# Patient Record
Sex: MALE | Race: WHITE | NOT HISPANIC OR LATINO | Employment: OTHER | URBAN - METROPOLITAN AREA
[De-identification: names, ages, dates, MRNs, and addresses within clinical notes are randomized per-mention and may not be internally consistent; named-entity substitution may affect disease eponyms.]

---

## 2017-08-21 ENCOUNTER — HISTORICAL (OUTPATIENT)
Dept: INFUSION THERAPY | Facility: HOSPITAL | Age: 67
End: 2017-08-21

## 2017-08-21 LAB
BUN SERPL-MCNC: 13 MG/DL (ref 7–18)
CALCIUM SERPL-MCNC: 9.7 MG/DL (ref 8.5–10.1)
CHLORIDE SERPL-SCNC: 102 MMOL/L (ref 98–107)
CO2 SERPL-SCNC: 31 MMOL/L (ref 21–32)
CREAT SERPL-MCNC: 1.09 MG/DL (ref 0.7–1.3)
GLUCOSE SERPL-MCNC: 166 MG/DL (ref 74–106)
POTASSIUM SERPL-SCNC: 4.5 MMOL/L (ref 3.5–5.1)
SODIUM SERPL-SCNC: 139 MMOL/L (ref 136–145)

## 2017-08-22 ENCOUNTER — HISTORICAL (OUTPATIENT)
Dept: INFUSION THERAPY | Facility: HOSPITAL | Age: 67
End: 2017-08-22

## 2017-08-22 LAB
BUN SERPL-MCNC: 11 MG/DL (ref 7–18)
CALCIUM SERPL-MCNC: 8.1 MG/DL (ref 8.5–10.1)
CHLORIDE SERPL-SCNC: 107 MMOL/L (ref 98–107)
CO2 SERPL-SCNC: 27 MMOL/L (ref 21–32)
CREAT SERPL-MCNC: 0.77 MG/DL (ref 0.7–1.3)
GLUCOSE SERPL-MCNC: 169 MG/DL (ref 74–106)
POTASSIUM SERPL-SCNC: 3.5 MMOL/L (ref 3.5–5.1)
SODIUM SERPL-SCNC: 141 MMOL/L (ref 136–145)

## 2017-08-23 ENCOUNTER — HISTORICAL (OUTPATIENT)
Dept: INFUSION THERAPY | Facility: HOSPITAL | Age: 67
End: 2017-08-23

## 2017-08-23 LAB
BUN SERPL-MCNC: 12 MG/DL (ref 7–18)
CALCIUM SERPL-MCNC: 8.8 MG/DL (ref 8.5–10.1)
CHLORIDE SERPL-SCNC: 99 MMOL/L (ref 98–107)
CO2 SERPL-SCNC: 32 MMOL/L (ref 21–32)
CREAT SERPL-MCNC: 0.98 MG/DL (ref 0.7–1.3)
GLUCOSE SERPL-MCNC: 166 MG/DL (ref 74–106)
POTASSIUM SERPL-SCNC: 3.8 MMOL/L (ref 3.5–5.1)
SODIUM SERPL-SCNC: 136 MMOL/L (ref 136–145)

## 2017-08-24 ENCOUNTER — HISTORICAL (OUTPATIENT)
Dept: INFUSION THERAPY | Facility: HOSPITAL | Age: 67
End: 2017-08-24

## 2017-08-24 LAB
BUN SERPL-MCNC: 16 MG/DL (ref 7–18)
CALCIUM SERPL-MCNC: 9.2 MG/DL (ref 8.5–10.1)
CHLORIDE SERPL-SCNC: 102 MMOL/L (ref 98–107)
CO2 SERPL-SCNC: 29 MMOL/L (ref 21–32)
CREAT SERPL-MCNC: 1 MG/DL (ref 0.7–1.3)
GLUCOSE SERPL-MCNC: 172 MG/DL (ref 74–106)
POTASSIUM SERPL-SCNC: 3.7 MMOL/L (ref 3.5–5.1)
SODIUM SERPL-SCNC: 137 MMOL/L (ref 136–145)

## 2017-08-25 ENCOUNTER — HISTORICAL (OUTPATIENT)
Dept: INFUSION THERAPY | Facility: HOSPITAL | Age: 67
End: 2017-08-25

## 2017-08-25 LAB
BUN SERPL-MCNC: 14 MG/DL (ref 7–18)
CALCIUM SERPL-MCNC: 9.3 MG/DL (ref 8.5–10.1)
CHLORIDE SERPL-SCNC: 99 MMOL/L (ref 98–107)
CO2 SERPL-SCNC: 31 MMOL/L (ref 21–32)
CREAT SERPL-MCNC: 1.01 MG/DL (ref 0.7–1.3)
GLUCOSE SERPL-MCNC: 167 MG/DL (ref 74–106)
POTASSIUM SERPL-SCNC: 3.8 MMOL/L (ref 3.5–5.1)
SODIUM SERPL-SCNC: 137 MMOL/L (ref 136–145)

## 2017-09-07 ENCOUNTER — TELEPHONE (OUTPATIENT)
Dept: NEUROLOGY | Facility: CLINIC | Age: 67
End: 2017-09-07

## 2017-09-07 NOTE — TELEPHONE ENCOUNTER
----- Message from Bianca Cordova sent at 9/6/2017  4:13 PM CDT -----  Contact: skye (wife) @ [phone] or [phone]  Calling to see if you received a referral from dr tiffanie vo in [place].  pls call.

## 2017-10-10 ENCOUNTER — OFFICE VISIT (OUTPATIENT)
Dept: NEUROLOGY | Facility: CLINIC | Age: 67
End: 2017-10-10
Payer: OTHER GOVERNMENT

## 2017-10-10 VITALS
WEIGHT: 242.31 LBS | HEIGHT: 72 IN | HEART RATE: 69 BPM | SYSTOLIC BLOOD PRESSURE: 142 MMHG | DIASTOLIC BLOOD PRESSURE: 84 MMHG | BODY MASS INDEX: 32.82 KG/M2

## 2017-10-10 DIAGNOSIS — G70.00 MYASTHENIA GRAVIS: Primary | ICD-10-CM

## 2017-10-10 PROCEDURE — 99205 OFFICE O/P NEW HI 60 MIN: CPT | Mod: S$GLB,,, | Performed by: PSYCHIATRY & NEUROLOGY

## 2017-10-10 PROCEDURE — 99999 PR PBB SHADOW E&M-EST. PATIENT-LVL IV: CPT | Mod: PBBFAC,,, | Performed by: PSYCHIATRY & NEUROLOGY

## 2017-10-10 RX ORDER — METOPROLOL SUCCINATE 25 MG/1
25 TABLET, EXTENDED RELEASE ORAL
COMMUNITY
Start: 2017-08-07 | End: 2018-03-13 | Stop reason: SDUPTHER

## 2017-10-10 RX ORDER — ATORVASTATIN CALCIUM 80 MG/1
80 TABLET, FILM COATED ORAL NIGHTLY
COMMUNITY
Start: 2017-08-07

## 2017-10-10 RX ORDER — SERTRALINE HYDROCHLORIDE 50 MG/1
50 TABLET, FILM COATED ORAL
COMMUNITY
Start: 2017-09-30 | End: 2017-12-23 | Stop reason: SDUPTHER

## 2017-10-10 RX ORDER — OMEPRAZOLE 20 MG/1
20 CAPSULE, DELAYED RELEASE ORAL DAILY
Qty: 30 CAPSULE | Refills: 11 | Status: SHIPPED | OUTPATIENT
Start: 2017-10-10 | End: 2018-11-28 | Stop reason: SDUPTHER

## 2017-10-10 RX ORDER — MELOXICAM 15 MG/1
15 TABLET ORAL
COMMUNITY
Start: 2017-07-21 | End: 2018-04-10

## 2017-10-10 RX ORDER — LISINOPRIL 40 MG/1
20 TABLET ORAL DAILY
COMMUNITY
Start: 2017-08-15

## 2017-10-10 RX ORDER — GLYCOPYRROLATE 1 MG/1
1 TABLET ORAL
COMMUNITY
Start: 2017-08-08 | End: 2018-03-13

## 2017-10-10 RX ORDER — PREDNISONE 10 MG/1
10 TABLET ORAL
COMMUNITY
Start: 2017-08-25 | End: 2017-10-10

## 2017-10-10 RX ORDER — PREDNISONE 20 MG/1
30 TABLET ORAL DAILY
Qty: 45 TABLET | Refills: 5 | Status: SHIPPED | OUTPATIENT
Start: 2017-10-10 | End: 2017-11-09

## 2017-10-10 RX ORDER — PYRIDOSTIGMINE BROMIDE 180 MG/1
180 TABLET, EXTENDED RELEASE ORAL
COMMUNITY
Start: 2017-10-03 | End: 2018-02-02 | Stop reason: SDUPTHER

## 2017-10-10 RX ORDER — ISOSORBIDE MONONITRATE 30 MG/1
15 TABLET, EXTENDED RELEASE ORAL EVERY MORNING
COMMUNITY
Start: 2017-08-07 | End: 2021-03-03

## 2017-10-10 RX ORDER — METFORMIN HYDROCHLORIDE 500 MG/1
500 TABLET, EXTENDED RELEASE ORAL
COMMUNITY
Start: 2017-09-06 | End: 2018-04-10 | Stop reason: SDUPTHER

## 2017-10-10 RX ORDER — CYCLOBENZAPRINE HCL 10 MG
10 TABLET ORAL
COMMUNITY
Start: 2017-07-21 | End: 2018-04-10

## 2017-10-10 RX ORDER — PREDNISONE 20 MG/1
20 TABLET ORAL
COMMUNITY
Start: 2017-09-21 | End: 2017-10-10

## 2017-10-10 NOTE — PATIENT INSTRUCTIONS
INCREASE PREDNISONE TO 30 MG  RESTART ZOLOFT  TAKE OMERPRAZOLE (ACID REDUCING PILL)  IVIG 80 G EVERY 3 WEEKS- WILL SET UP HOME INFUSION.

## 2017-10-10 NOTE — PROGRESS NOTES
Patient Name: Otoniel Candelario  MRN: [id number]    CC: Generalized weakness, blurry vision, Myasthenia gravis     HPI: Otoniel Candelario is a 66 y.o. male with Pmhx of HTN, DM, HLD, MG (Ach-R ab +) presents to neuromuscular clinic to establish care as he was recently diagnosed with MG in March, 2017. Patient started noticing drooping eyelids ~ 2 years ago. Patient also started experiencing neck weakness, choking on food/water, slurred speech and generalized weakness and fatigue during the end of the day. Patient also complains of diplopia, dyspnea and blurry vision. Patient referred by Dr. Carlos for further management of MG. Patient has received IVIG in the end of August- states that he started noticing improvement after a week. He reported improvement in chewing and vision. Patient's current regimen is Prednisone 10 mg (down from 20 mg) and timespan 180 mg bid. Patient states that he has felt increasing shortness of breath since decreasing prednisone dose down to 10 mg. Patient endorses chewing issues fatigue, blurriness of vision, swallowing issues with food and water, and lower ext weakness.       EMG- electrographic evidence of decremental response to repetative nerve stimulation    ROS:   Review of Systems   Constitutional: + for malaise/fatigue. Negative for weight loss.   HENT: Negative for hearing loss.   Eyes: Negative for blurred vision and double vision.   Respiratory: + for shortness of breath and -ve for stridor.   Cardiovascular: Negative for chest pain and palpitations.   Gastrointestinal: Negative for nausea, vomiting and constipation.   Genitourinary: Negative for frequency. Negative for urgency.   Musculoskeletal: Negative for joint pain. Negative for myalgias and falls.   Skin: Negative for rash.   Neurological: Negative for dizziness and tremors. Negative for focal weakness and seizures.   Endo/Heme/Allergies: Does not bruise/bleed easily.   Psychiatric/Behavioral: Negative for memory loss. Negative for  depression and hallucinations. The patient is somewhat nervous/anxious.      Past Medical History  No past medical history on file.    Medications    Current Outpatient Prescriptions:     atorvastatin (LIPITOR) 80 MG tablet, 80 mg., Disp: , Rfl:     cyclobenzaprine (FLEXERIL) 10 MG tablet, 10 mg., Disp: , Rfl:     glycopyrrolate (ROBINUL) 1 mg Tab, 1 mg., Disp: , Rfl:     isosorbide mononitrate (IMDUR) 30 MG 24 hr tablet, 30 mg., Disp: , Rfl:     lisinopril (PRINIVIL,ZESTRIL) 40 MG tablet, 40 mg., Disp: , Rfl:     meloxicam (MOBIC) 15 MG tablet, 15 mg., Disp: , Rfl:     metformin (GLUCOPHAGE-XR) 500 MG 24 hr tablet, 500 mg., Disp: , Rfl:     metoprolol succinate (TOPROL-XL) 25 MG 24 hr tablet, 25 mg., Disp: , Rfl:     predniSONE (DELTASONE) 10 MG tablet, 10 mg., Disp: , Rfl:     predniSONE (DELTASONE) 20 MG tablet, 20 mg., Disp: , Rfl:     pyridostigmine (MESTINON) 180 mg TbSR, 180 mg., Disp: , Rfl:     sertraline (ZOLOFT) 50 MG tablet, 50 mg., Disp: , Rfl:     Allergies  Review of patient's allergies indicates:  No Known Allergies    Social History  Social History     Social History    Marital status:      Spouse name: N/A    Number of children: N/A    Years of education: N/A     Occupational History    Not on file.     Social History Main Topics    Smoking status: Current Some Day Smoker     Types: Cigars    Smokeless tobacco: Never Used    Alcohol use Not on file    Drug use: Unknown    Sexual activity: Not on file     Other Topics Concern    Not on file     Social History Narrative    No narrative on file       Family History  No family history on file.    Physical Exam  BP (!) 142/84   Pulse 69   Ht 6' (1.829 m)   Wt 109.9 kg (242 lb 4.6 oz)   BMI 32.86 kg/m²     General appearance: Well-developed, well-groomed, bilateral ptosis    Neurologic Exam: The patient is awake, alert and oriented. Language is fluent. Recent and remote memory are normal. Attention span and concentration  are normal. Fund of knowledge is appropriate.     Cranial nerves: pupils are round and reactive to light and accommodation. Visual fields are full to confrontation. Fundoscopic exam reveals sharp discs bilaterally, with good venous pulsations. Ocular motility is full in all cardinal positions of gaze. Facial sensation is normal to pinprick and light touch. Corneal reflexes are present bilaterally. Facial activation is symmetric. Hearing is normal bilaterally. Palate elevates symmetrically and gag reflex is intact bilaterally. Shoulder elevation is symmetric and full strength bilaterally. Tongue is midline and neck rotation strength is normal bilaterally. Neck range of motion is normal.     Motor examination of all extremities demonstrates normal bulk and tone in all four limbs. There are no atrophy or fasciculations. Fatigubalilty in upper ext bilaterally 4/5 (deltoids), Neck flexion weakness 4/5    Sensory examination is normal to pinprick, vibration and proprioception in the upper and lower extremities bilaterally. Romberg is negative.    Deep tendon reflexes are 2+ and symmetric in the upper and lower extremities bilaterally. Toes are mute bilaterally.     Gait: Trouble tandem and heel walking    Coordination: Finger to nose and heel to shin testing is normal in both upper and lower extremities. Rapid alternating movements are normal in both upper and lower extremities.     Single breath count- 34      General exam  Cardiovascular: regular rate and rhythm with no murmurs, rubs or gallops. There are no carotid or vertebral artery bruits. Pulses in both upper and lower extremities are symmetric. There is no peripheral edema.   Head and neck: no cervical lymphadenopathy      Lab and Test Results    No results found for: WBC, HGB, HCT, PLT  No results found for: GLU, NA, K, CL, CO2, BUN, CREATININE, CALCIUM, MG, PHOS  No results found for: ALB, ALKPHOS, ALT, AST      Images: CT Chest- no evidence of  thymoma    Independently reviewed NO    Other Tests    Assessment and Plan    Otoniel Candelario is a 66 y.o. 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 Patient experiencing sob at rest and has historically had trouble with chewing (resolved since treatment initiated). Will increase his amount of prednisone and obtain labs including other antibody panel assoc with MG. Will also initiate IVIG home infusions every 2-3 weeks.     CTS referral   Increase prednsione to 30 mg Daily  IVIG 80g per day q2-3 weeks- infuse slow  TPMT  MG/LEMS panel  MuSK ab  CBC  CMP  Take zoloft- patient stopped it on his own- has anxiety  Omeprazole OTC  Will need to coordinate with PCP or endocrinology for BG control on increase steroid.     RTC in 4-6 weeks          Valarie Zacarias MD  Neurology Resident   [facility]  [address] [place] [address]  Pager: [phone]      I have personally taken a history and examined the patient and agree with the resident's note as stated above.    Fernando Trejo MD  [facility]

## 2017-10-12 PROBLEM — G70.00 MYASTHENIA GRAVIS: Status: ACTIVE | Noted: 2017-10-12

## 2017-10-22 ENCOUNTER — PATIENT MESSAGE (OUTPATIENT)
Dept: NEUROLOGY | Facility: CLINIC | Age: 67
End: 2017-10-22

## 2017-10-26 ENCOUNTER — TELEPHONE (OUTPATIENT)
Dept: NEUROLOGY | Facility: CLINIC | Age: 67
End: 2017-10-26

## 2017-10-26 NOTE — TELEPHONE ENCOUNTER
----- Message from Jethro Juan sent at 10/26/2017  9:43 AM CDT -----  Contact: Self @ [phone]  Pt is asking to speak with someone about scheduling IVIG treatment the doctor recommended. Pls call.

## 2017-10-26 NOTE — TELEPHONE ENCOUNTER
Spoke to the patient to let him know his referral was sent to [facility] and we are waiting for a response back.

## 2017-11-01 ENCOUNTER — TELEPHONE (OUTPATIENT)
Dept: NEUROLOGY | Facility: CLINIC | Age: 67
End: 2017-11-01

## 2017-11-01 NOTE — TELEPHONE ENCOUNTER
----- Message from Randy Nunez sent at 10/31/2017 12:10 PM CDT -----  Contact: self  Pt calling to f/u on IVIG treatment status pt can be reached at [phone]...

## 2017-11-01 NOTE — TELEPHONE ENCOUNTER
Spoke to the patient to let him know I would get in touch with [facility] today and have them call him.

## 2017-11-02 NOTE — TELEPHONE ENCOUNTER
----- Message from Jethro Juan sent at 11/1/2017 11:30 AM CDT -----  Contact: Arlene espinosa/ Lindy Castillo @ [phone], option 2  Arlene is asking to speak with Marta in regards to pt's IVIG therapy. Pls call.

## 2017-11-08 ENCOUNTER — TELEPHONE (OUTPATIENT)
Dept: NEUROLOGY | Facility: CLINIC | Age: 67
End: 2017-11-08

## 2017-11-08 NOTE — TELEPHONE ENCOUNTER
----- Message from Cristina Weathers sent at 11/8/2017 12:03 PM CST -----  Contact: self  Otoniel would like to speak with a nurse regarding a treatment that he is supposed to be set up for, but has not heard from anyone regarding an appt for this treatment.  Pt is calling for an update.    Please contact Otoniel back at [phone]    Thank you

## 2017-11-08 NOTE — TELEPHONE ENCOUNTER
Spoke to the patient to let him know [place] has his information and will be contacting him. I also spoke with [place] and his information was given to his insurance company.

## 2017-11-13 ENCOUNTER — TELEPHONE (OUTPATIENT)
Dept: NEUROLOGY | Facility: CLINIC | Age: 67
End: 2017-11-13

## 2017-11-13 NOTE — TELEPHONE ENCOUNTER
----- Message from Suzanne Treadwell sent at 11/13/2017  2:42 PM CST -----  Contact: PT  PT is calling requesting to speak with nurse regarding medication & future appointment  PT is calling about IV treatments that's scheduled to be at his home.    Callback: [phone]

## 2017-11-16 ENCOUNTER — TELEPHONE (OUTPATIENT)
Dept: NEUROLOGY | Facility: CLINIC | Age: 67
End: 2017-11-16

## 2017-11-16 NOTE — TELEPHONE ENCOUNTER
I spoke to this patient several times to let him know [place] has his referral and they will call him to schedule an appointment for his IVIG.

## 2017-11-16 NOTE — TELEPHONE ENCOUNTER
----- Message from Katty Davis sent at 11/16/2017 10:05 AM CST -----  Contact: Pt  Pt would like to be called back regarding IV treatments that he was supposed to be scheduled for.        Please call pt at [phone].        Thanks!

## 2017-11-17 ENCOUNTER — PATIENT MESSAGE (OUTPATIENT)
Dept: NEUROLOGY | Facility: CLINIC | Age: 67
End: 2017-11-17

## 2017-11-20 ENCOUNTER — TELEPHONE (OUTPATIENT)
Dept: NEUROLOGY | Facility: CLINIC | Age: 67
End: 2017-11-20

## 2017-11-20 NOTE — TELEPHONE ENCOUNTER
----- Message from Bruno Nix sent at 11/20/2017  9:19 AM CST -----  Contact: Pt  Pt would like a call back from nurse in ref to IV treatment.      Pt stated he is always weak in the morning and the IV treatments have help with issue in the past     Can be reached at [phone]

## 2017-11-28 ENCOUNTER — TELEPHONE (OUTPATIENT)
Dept: FAMILY MEDICINE | Facility: CLINIC | Age: 67
End: 2017-11-28

## 2017-11-28 NOTE — TELEPHONE ENCOUNTER
----- Message from Jethro MCCRARY Drake sent at 11/28/2017  9:46 AM CST -----  Contact: Self @ [phone]  Pt states he was scheduled for IVIG treatment, but [facility] is saying they don't have script for medication, and pt said he's been waiting 2 months regarding this and is asking to speak with someone asap. Pls call.

## 2017-12-04 ENCOUNTER — TELEPHONE (OUTPATIENT)
Dept: NEUROLOGY | Facility: CLINIC | Age: 67
End: 2017-12-04

## 2017-12-04 NOTE — TELEPHONE ENCOUNTER
----- Message from Anushka Gaona sent at 12/1/2017  2:13 PM CST -----  Contact: [facility]  Mr. Rendon is calling to speak with Staff regarding complete orders for the pt's IG Infusion.  The forms have been sent three times and they have not received a response and the pt's been calling them.    He can be reached at [phone], Option 2.    Thank you.

## 2017-12-06 ENCOUNTER — TELEPHONE (OUTPATIENT)
Dept: NEUROLOGY | Facility: CLINIC | Age: 67
End: 2017-12-06

## 2017-12-06 NOTE — TELEPHONE ENCOUNTER
Spoke to the patient to let him know I will continue to call [place] [facility].

## 2017-12-06 NOTE — TELEPHONE ENCOUNTER
----- Message from Samy Fitzgerald sent at 12/6/2017  7:51 AM CST -----  Contact: Pt   Pt would like the nurse to give him a call back in regards to his prescription was messed and need to get his treatments done .. Contact number [phone]..

## 2017-12-07 NOTE — TELEPHONE ENCOUNTER
----- Message from Bubba Srivastava sent at 12/7/2017 10:15 AM CST -----  Contact: [facility] @ [phone] opt 2  Caller is calling to get clarity on medication, pls call

## 2017-12-20 ENCOUNTER — TELEPHONE (OUTPATIENT)
Dept: NEUROLOGY | Facility: CLINIC | Age: 67
End: 2017-12-20

## 2017-12-20 NOTE — TELEPHONE ENCOUNTER
----- Message from Everardo Harper sent at 12/20/2017  9:47 AM CST -----  Contact: patient   Patient called in requesting to speak with Dr. Zacarias or Dr. Zacarias's nurse. Please contact patient at [phone]. Thank You.

## 2017-12-20 NOTE — TELEPHONE ENCOUNTER
Patient had the IVIG on last week and will have the second round is on January 3. Would like to know when you would like to see him back in clinic. He would also like to know if you can refill his sertaline.

## 2017-12-23 RX ORDER — SERTRALINE HYDROCHLORIDE 50 MG/1
TABLET, FILM COATED ORAL
Qty: 15 TABLET | Refills: 3 | Status: SHIPPED | OUTPATIENT
Start: 2017-12-23 | End: 2018-04-27 | Stop reason: SDUPTHER

## 2018-01-09 ENCOUNTER — TELEPHONE (OUTPATIENT)
Dept: NEUROLOGY | Facility: CLINIC | Age: 68
End: 2018-01-09

## 2018-01-09 NOTE — TELEPHONE ENCOUNTER
----- Message from Bubba Srivastava sent at 1/8/2018  4:11 PM CST -----  Contact: Patient @ [phone]  Patient is requesting a return call about a f/u before or after the IVIG treatment, pls call

## 2018-01-19 ENCOUNTER — TELEPHONE (OUTPATIENT)
Dept: NEUROLOGY | Facility: CLINIC | Age: 68
End: 2018-01-19

## 2018-01-19 NOTE — TELEPHONE ENCOUNTER
----- Message from Bubba Srivastava sent at 1/19/2018 10:15 AM CST -----  Contact: Patient @ [phone]  Patient is calling to get an update on a following appt if needed following to IVIG treatment, pls call

## 2018-01-31 RX ORDER — PYRIDOSTIGMINE BROMIDE 180 MG/1
180 TABLET, EXTENDED RELEASE ORAL
OUTPATIENT
Start: 2018-01-31

## 2018-01-31 NOTE — TELEPHONE ENCOUNTER
----- Message from Migue Herrera sent at 1/31/2018  8:48 AM CST -----  Contact: Pt  States he would like a call regarding refill on his medication.    pyridostigmine (MESTINON) 180 mg TbSR    He would also like a call regarding his treatments    Call pharmacy [phone]    Call him @ [phone]

## 2018-01-31 NOTE — TELEPHONE ENCOUNTER
Patient would like to know when you want to see him back in clinic. Also have questions about a medication you wanted to start him on. Please advise patient.

## 2018-02-05 ENCOUNTER — TELEPHONE (OUTPATIENT)
Dept: NEUROLOGY | Facility: CLINIC | Age: 68
End: 2018-02-05

## 2018-02-05 RX ORDER — PYRIDOSTIGMINE BROMIDE 180 MG/1
TABLET, EXTENDED RELEASE ORAL
Qty: 60 TABLET | Refills: 5 | Status: SHIPPED | OUTPATIENT
Start: 2018-02-05 | End: 2018-03-13 | Stop reason: SDUPTHER

## 2018-02-05 NOTE — TELEPHONE ENCOUNTER
Attempted to call patient back multiple times this morning. I am unaware of which medication he has questions about as he is taking prednisone and sertraline which I have refilled from him in the past.

## 2018-02-05 NOTE — TELEPHONE ENCOUNTER
----- Message from Anushka Gaona sent at 2/2/2018 12:18 PM CST -----  Contact: Self  Pt is calling to speak with Staff regarding his prescription for medication.    He can be reached at [phone].    Thank you.

## 2018-02-12 ENCOUNTER — TELEPHONE (OUTPATIENT)
Dept: NEUROLOGY | Facility: CLINIC | Age: 68
End: 2018-02-12

## 2018-02-12 NOTE — TELEPHONE ENCOUNTER
----- Message from Jethro Juan sent at 2/12/2018 10:30 AM CST -----  Contact: Nithin/ NATALIYA Patel @ [phone], ext [phone]  Sandra is calling to confirm prior auth for gamunex c, sent on 02/08, was received. Pls call.

## 2018-02-15 ENCOUNTER — TELEPHONE (OUTPATIENT)
Dept: NEUROLOGY | Facility: CLINIC | Age: 68
End: 2018-02-15

## 2018-02-15 NOTE — TELEPHONE ENCOUNTER
----- Message from Anushka Gaona sent at 2/15/2018  9:45 AM CST -----  Contact: Self  Pt is calling to speak with Staff regarding treatments.  His medication was to be shipped yesterday, but did not come because Dr. Zacarias needs to provide pre-authorization.    He can be reached at [phone].    Thank you.

## 2018-02-16 NOTE — TELEPHONE ENCOUNTER
----- Message from Jethro Juan sent at 2/16/2018 10:05 AM CST -----  Contact: Sandra espinosa/ [facility] @ [phone]  Sandra is calling to check on the status of PA for jaky Flores said verbal order is okay too.

## 2018-03-13 ENCOUNTER — OFFICE VISIT (OUTPATIENT)
Dept: NEUROLOGY | Facility: CLINIC | Age: 68
End: 2018-03-13
Payer: OTHER GOVERNMENT

## 2018-03-13 VITALS — WEIGHT: 238.75 LBS | BODY MASS INDEX: 32.38 KG/M2

## 2018-03-13 DIAGNOSIS — G70.00 MG (MYASTHENIA GRAVIS): Primary | ICD-10-CM

## 2018-03-13 DIAGNOSIS — R73.9 HYPERGLYCEMIA: Primary | ICD-10-CM

## 2018-03-13 PROCEDURE — 99214 OFFICE O/P EST MOD 30 MIN: CPT | Mod: S$GLB,,, | Performed by: PSYCHIATRY & NEUROLOGY

## 2018-03-13 PROCEDURE — 99999 PR PBB SHADOW E&M-EST. PATIENT-LVL III: CPT | Mod: PBBFAC,,, | Performed by: PSYCHIATRY & NEUROLOGY

## 2018-03-13 RX ORDER — PYRIDOSTIGMINE BROMIDE 180 MG/1
180 TABLET, EXTENDED RELEASE ORAL NIGHTLY
Qty: 30 TABLET | Refills: 5 | Status: SHIPPED | OUTPATIENT
Start: 2018-03-13 | End: 2018-04-10

## 2018-03-13 RX ORDER — ALFUZOSIN HYDROCHLORIDE 10 MG/1
10 TABLET, EXTENDED RELEASE ORAL DAILY
Refills: 6 | COMMUNITY
Start: 2018-02-19 | End: 2018-06-06

## 2018-03-13 RX ORDER — PYRIDOSTIGMINE BROMIDE 60 MG/1
60 TABLET ORAL 2 TIMES DAILY
Qty: 60 TABLET | Refills: 11 | Status: SHIPPED | OUTPATIENT
Start: 2018-03-13 | End: 2018-04-10 | Stop reason: DRUGHIGH

## 2018-03-13 RX ORDER — PREDNISONE 20 MG/1
20 TABLET ORAL DAILY
Refills: 5 | COMMUNITY
Start: 2018-02-11 | End: 2018-04-26 | Stop reason: SDUPTHER

## 2018-03-13 RX ORDER — METFORMIN HYDROCHLORIDE 500 MG/1
500 TABLET ORAL 2 TIMES DAILY
Refills: 3 | COMMUNITY
Start: 2018-02-14 | End: 2018-07-06 | Stop reason: CLARIF

## 2018-03-13 NOTE — PROGRESS NOTES
Patient Name: Otoniel Candelario  MRN: [id number]    CC: Generalized weakness, blurry vision, Myasthenia gravis     Thymic status- CT scan in 2017- no evidence of thymoma (has not seen CTS)  Maintenance: IVIG q3w, prednisone 30 mg daily  Last dose change: Increase Imuran to 50 mg bid (12/2017)  Rescue: IVIG   Prior immunemodulatory agents: None    HPI: Otoniel Candelario is a 67 y.o. male with Pmhx of HTN, DM, HLD, MG (Ach-R ab +) presents to neuromuscular clinic to establish care as he was recently diagnosed with MG in March, 2017. Patient started noticing drooping eyelids ~ 2 years ago. Patient also started experiencing neck weakness, choking on food/water, slurred speech and generalized weakness and fatigue during the end of the day. Patient also complains of diplopia, dyspnea and blurry vision. Patient referred by Dr. Carlos for further management of MG. Patient has received IVIG in the end of August- states that he started noticing improvement after a week. He reported improvement in chewing and vision. Patient's current regimen is Prednisone 10 mg (down from 20 mg) and timespan 180 mg bid. Patient states that he has felt increasing shortness of breath since decreasing prednisone dose down to 10 mg. Patient endorses chewing issues fatigue, blurriness of vision, swallowing issues with food and water, and lower ext weakness.       EMG- electrographic evidence of decremental response to repetative nerve stimulation    Interval Hx- Patient states that he has had significant improvement since receiving home IVIG. Improved SOB and chewing issues. Decreased muscle fatigue, has been working outside in the yard. States that he has been having BLE muscle cramps mostly at night time. Also has what sounds like dystonia in upper ext at nighttime. States that he feels that timespan is less effective than when he was taking mestinon.     ROS:   Review of Systems   Constitutional: + for malaise/fatigue. Negative for weight loss.   HENT:  Negative for hearing loss.   Eyes: Negative for blurred vision and double vision.   Respiratory: + for shortness of breath and -ve for stridor.   Cardiovascular: Negative for chest pain and palpitations.   Gastrointestinal: Negative for nausea, vomiting and constipation.   Genitourinary: Negative for frequency. Negative for urgency.   Musculoskeletal: Negative for joint pain. Negative for myalgias and falls.   Skin: Negative for rash.   Neurological: Negative for dizziness and tremors. Negative for focal weakness and seizures.   Endo/Heme/Allergies: Does not bruise/bleed easily.   Psychiatric/Behavioral: Negative for memory loss. Negative for depression and hallucinations. The patient is somewhat nervous/anxious.      Past Medical History  No past medical history on file.    Medications    Current Outpatient Prescriptions:     atorvastatin (LIPITOR) 80 MG tablet, 80 mg., Disp: , Rfl:     glycopyrrolate (ROBINUL) 1 mg Tab, 1 mg., Disp: , Rfl:     isosorbide mononitrate (IMDUR) 30 MG 24 hr tablet, 30 mg., Disp: , Rfl:     lisinopril (PRINIVIL,ZESTRIL) 40 MG tablet, 40 mg., Disp: , Rfl:     metformin (GLUCOPHAGE-XR) 500 MG 24 hr tablet, 500 mg., Disp: , Rfl:     omeprazole (PRILOSEC) 20 MG capsule, Take 1 capsule (20 mg total) by mouth once daily., Disp: 30 capsule, Rfl: 11    pyridostigmine (MESTINON) 180 mg TbSR, TAKE 1 TABLET BY MOUTH TWICE A DAY, Disp: 60 tablet, Rfl: 5    sertraline (ZOLOFT) 50 MG tablet, TAKE 1/2 TABLET BY MOUTH AT BEDTIME, Disp: 15 tablet, Rfl: 3    alfuzosin (UROXATRAL) 10 mg Tb24, Take 10 mg by mouth once daily., Disp: , Rfl: 6    cyclobenzaprine (FLEXERIL) 10 MG tablet, 10 mg., Disp: , Rfl:     meloxicam (MOBIC) 15 MG tablet, 15 mg., Disp: , Rfl:     metFORMIN (GLUCOPHAGE) 500 MG tablet, Take 500 mg by mouth 2 (two) times daily., Disp: , Rfl: 3    predniSONE (DELTASONE) 20 MG tablet, Take 20 mg by mouth once daily., Disp: , Rfl: 5    Allergies  Review of patient's allergies  indicates:  No Known Allergies    Social History  Social History     Social History    Marital status:      Spouse name: N/A    Number of children: N/A    Years of education: N/A     Occupational History    Not on file.     Social History Main Topics    Smoking status: Current Some Day Smoker     Types: Cigars    Smokeless tobacco: Never Used    Alcohol use Not on file    Drug use: Unknown    Sexual activity: Not on file     Other Topics Concern    Not on file     Social History Narrative    No narrative on file       Family History  No family history on file.    Physical Exam  Wt 108.3 kg (238 lb 12.1 oz)   BMI 32.38 kg/m²     Myasthenia Gravis Activities of Daily Living Scale     Grade   Function 0 1 2 3   Double Vision None Occasional, not every day Daily, but not constant Constant    x      Eyelid Droop None Occasional, not every day Daily, but not constant Constant    x      Talking Normal Intermittent slurring or nasal speech Constant slurring or nasal speech, but can be understood Speech difficult to understand    x      Chewing Normal Fatigues with solid food Fatigues with soft food Gastric tube    x      Swallowing Normal Chokes rarely Frequent choking requiring change of diet Gastric tube     x     Breathing Normal Shortness of breath on exertion Shortness of breath at rest Ventilator     x     Brushing teeth or hair Normal Requires extra effort but requires no rest period Rest periods needed Cannot do one or more of these functions    x      Ability to rise from chair or toilet Normal Sometimes uses arms Always uses arms Requires assistance     x     Total MG ADL Score 3     MG Composite Scale  Ptosis, upward gaze (physician examination)   > 45 seconds   = 0 11 - 45 seconds  = 1 1 - 10 seconds  = 2 Immediate   = 3     1     Double vision on lateral gaze,   left or right   (physician examination) > 45 seconds  = 0 11 - 45 seconds  = 1 1 - 10 seconds  = 2 Immediate   = 3      2    Eye  closure   (physician examination) Normal   = 0 Mild weakness   (forced open with effort)  = 0 Moderate weakness   (forced open easily)   = 1 Severe weakness (unable to keep eyes closed)  = 2    0      Talking   (patient history) Normal   = 0 Intermittent slurring or nasal speech  = 2 Constant slurring or nasal but can be understood   = 4 Difficult to understand speech   = 6    0      Chewing   (patient history) Normal   = 0 Fatigue with solid food   = 2 Fatigue with soft food  = 4 Gastric tube  = 6    0      Swallowing   (patient history) Normal   = 0 Rare episode of choking or trouble swallowing   = 2 Frequent trouble swallowing   = 5 Gastric tube   = 6    0      Breathing   (thought to be caused by MG) Normal   = 0 Shortness of breath with exertion   = 2 Shortness of breath at rest   = 4 Ventilator dependence   = 9    0      Neck flexion or extension (weakest) (physician examination) Normal   = 0 Mild weakness   = 1 Moderate weakness (~50% +/- 15%)  = 3 Severe weakness = 4    0      Shoulder abduction (physician examination) Normal   = 0 Mild weakness  = 1 Moderate weakness   (~50% +/- 15%)  = 4 Severe weakness = 5    0      Hip flexion   (physician examination) Normal   = 0 Mild weakness   = 2 Moderate weakness   (~50% +/- 15%)  = 4 Severe weakness = 5    0      Total MGC Score 3     Myasthenia Gravis Manual Muscle Testing  0 = Normal / no impairment  1 = 25% weak / mild impairment l 2 = 50% weak / moderate impairment  3 = 75% weak / severe impairment l 4 = paralyzed / unable to do  Cranial Right Left Sum   Eyelid ptosis 0 0 0   Diplopia 0 0 0   Eye Closure  0   Cheek Puff  0   Jaw Closure  0   Tongue Protrusion  0   Cranial Muscle Score    Limb    Neck flexion  0   Neck extension  0   Shoulder abduction 0 0 0   Elbow flexion 0 0    Elbow extension 0 0    Wrist extension 0 0     0 0    Hip flexion 1 1 2   Knee extension 0 0    Knee flexion 1 1    Ankle dorsiflexion 1 1    Ankle plantarflexion 1 1    Limb  Muscle Score 2   Total MMT Score 2     Myasthenia Gravis- Quality of Life Score (revised) - MG QoL-15r  Please indicate how true each statement has been (over the past four weeks).   Not at all Somewhat Very Much    0 1 2   1. I am frustrated by my MG    x    2. I have trouble with my eyes because of my MG (e.g. double vision)    x    3. I have trouble eating because of MG   x     4. I have limited my social activity because of my MG   x     5. My MG limits my ability to enjoy hobbies and fun activities   x     6. I have trouble meeting the needs of my family because of my MG   x     7. I have to make plans around my MG    x    8. I am bothered by limitations in performing my work (include work at home) because of my MG  x    9. I have difficulty speaking due to MG   x     10. I have lost some personal independence because of my MG (e.g. driving, shopping, running errands)  x    11. I am depressed about my MG    x    12. I have trouble walking due to MG    x    13. I have trouble getting around public places because of my MG   x     14. I feel overwhelmed by my MG   x     15. I have trouble performing my personal grooming needs due to MG   x      Total MG-QoL15r Score 7         General appearance: Well-developed, well-groomed, bilateral ptosis has improved.     Neurologic Exam: The patient is awake, alert and oriented. Language is fluent. Recent and remote memory are normal. Attention span and concentration are normal. Fund of knowledge is appropriate.     Cranial nerves: pupils are round and reactive to light and accommodation. Visual fields are full to confrontation. Fundoscopic exam reveals sharp discs bilaterally, with good venous pulsations. Ocular motility is full in all cardinal positions of gaze. Facial sensation is normal to pinprick and light touch. Corneal reflexes are present bilaterally. Facial activation is symmetric. Hearing is normal bilaterally. Palate elevates symmetrically and gag reflex is intact  bilaterally. Shoulder elevation is symmetric and full strength bilaterally. Tongue is midline and neck rotation strength is normal bilaterally. Neck range of motion is normal.     Motor examination of all extremities demonstrates normal bulk and tone in all four limbs. There are no atrophy or fasciculations. No neck flexion or ext weakness. Shoulder AB/ADduction 5/5    Sensory examination is normal to pinprick, vibration and proprioception in the upper and lower extremities bilaterally. Romberg is negative.    Deep tendon reflexes are 2+ and symmetric in the upper and lower extremities bilaterally. Toes are mute bilaterally.     Gait: Trouble tandem and heel walking    Coordination: Finger to nose and heel to shin testing is normal in both upper and lower extremities. Rapid alternating movements are normal in both upper and lower extremities.     Single breath count- 24      General exam  Cardiovascular: regular rate and rhythm with no murmurs, rubs or gallops. There are no carotid or vertebral artery bruits. Pulses in both upper and lower extremities are symmetric. There is no peripheral edema.   Head and neck: no cervical lymphadenopathy        Lab and Test Results    WBC   Date Value Ref Range Status   10/10/2017 11.63 3.90 - 12.70 K/uL Final     Hemoglobin   Date Value Ref Range Status   10/10/2017 13.8 (L) 14.0 - 18.0 g/dL Final     Hematocrit   Date Value Ref Range Status   10/10/2017 41.2 40.0 - 54.0 % Final     Platelets   Date Value Ref Range Status   10/10/2017 236 150 - 350 K/uL Final     Glucose   Date Value Ref Range Status   10/10/2017 185 (H) 70 - 110 mg/dL Final     Sodium   Date Value Ref Range Status   10/10/2017 141 136 - 145 mmol/L Final     Potassium   Date Value Ref Range Status   10/10/2017 4.1 3.5 - 5.1 mmol/L Final     Chloride   Date Value Ref Range Status   10/10/2017 100 95 - 110 mmol/L Final     CO2   Date Value Ref Range Status   10/10/2017 31 (H) 23 - 29 mmol/L Final     BUN, Bld    Date Value Ref Range Status   10/10/2017 12 8 - 23 mg/dL Final     Creatinine   Date Value Ref Range Status   10/10/2017 0.9 0.5 - 1.4 mg/dL Final     Calcium   Date Value Ref Range Status   10/10/2017 9.2 8.7 - 10.5 mg/dL Final     Alkaline Phosphatase   Date Value Ref Range Status   10/10/2017 89 55 - 135 U/L Final     ALT   Date Value Ref Range Status   10/10/2017 39 10 - 44 U/L Final     AST   Date Value Ref Range Status   10/10/2017 29 10 - 40 U/L Final         Images: CT Chest- no evidence of thymoma    Independently reviewed NO    Other Tests    Assessment and Plan    Otoniel Candelario is a 67 y.o. 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 Patient experiencing sob at rest and has historically had trouble with chewing (resolved since treatment initiated). Will increase his amount of prednisone and obtain labs including other antibody panel assoc with MG. Patient with significant improvement in symptoms (choking, SOB, weakness) since starting IVIG. Will spread out IVIG to [id number]. Start mestinon and keep nighttime timespan with goal of removing timespan altogether.     CTS referral  Continue Prednsione to 30 mg Daily for now  Start Mestinion 60 mg in am and early afternoon, continue timespan 180 mg QHS  Decrease frequency of IVIG to q5 weeks  TPMT- nml  Taking zoloft for anxiety  Omeprazole OTC while on steroids  Will need to coordinate with PCP or endocrinology for BG control on increase steroid.   Annual derm exam due to Imuran  CK  Start Imuran in future.     RTC IN 3 WEEKS (END OF IVIG CYCLE)      Valarie Zacarias MD  Neurology Resident   [facility]  [address] [place] [id number]  Pager: [phone]          I have personally taken the history and examined the patient and agree with the resident's note as stated above.    Phu Kaur MD, TRUPTI, FAAN  Department of Neurology   [facility] [place], [place]

## 2018-03-13 NOTE — PATIENT INSTRUCTIONS
STOP Morning PYRIDOSTIGMINE ER and continue nighttime dose 180 mg.    START MESTINON 60 MG IN MORNING AND EARLY AFTERNOON    IVIG infusions - change from every 3 weeks to every 5 weeks. Monitor for clinical change. PLEASE CALL COMPANY

## 2018-03-26 ENCOUNTER — TELEPHONE (OUTPATIENT)
Dept: NEUROLOGY | Facility: CLINIC | Age: 68
End: 2018-03-26

## 2018-03-26 NOTE — TELEPHONE ENCOUNTER
----- Message from Bubba Srivastava sent at 3/23/2018 12:06 PM CDT -----  Contact: [facility] @ [phone] opt [phone]  Caller is calling to get a verbal on (GAMUNEX ) pls call

## 2018-04-10 ENCOUNTER — OFFICE VISIT (OUTPATIENT)
Dept: NEUROLOGY | Facility: CLINIC | Age: 68
End: 2018-04-10
Payer: OTHER GOVERNMENT

## 2018-04-10 VITALS
HEIGHT: 72 IN | BODY MASS INDEX: 31.14 KG/M2 | HEART RATE: 68 BPM | DIASTOLIC BLOOD PRESSURE: 96 MMHG | SYSTOLIC BLOOD PRESSURE: 167 MMHG | WEIGHT: 229.94 LBS

## 2018-04-10 DIAGNOSIS — R06.02 SOB (SHORTNESS OF BREATH): ICD-10-CM

## 2018-04-10 DIAGNOSIS — Z79.899 LONG-TERM CURRENT USE OF INTRAVENOUS IMMUNOGLOBULIN (IVIG): ICD-10-CM

## 2018-04-10 DIAGNOSIS — G70.00 MG (MYASTHENIA GRAVIS): Primary | ICD-10-CM

## 2018-04-10 PROCEDURE — 99999 PR PBB SHADOW E&M-EST. PATIENT-LVL IV: CPT | Mod: PBBFAC,,, | Performed by: PSYCHIATRY & NEUROLOGY

## 2018-04-10 PROCEDURE — 99214 OFFICE O/P EST MOD 30 MIN: CPT | Mod: S$GLB,,, | Performed by: PSYCHIATRY & NEUROLOGY

## 2018-04-10 RX ORDER — LANCETS 30 GAUGE
EACH MISCELLANEOUS
Refills: 0 | COMMUNITY
Start: 2018-02-14

## 2018-04-10 RX ORDER — PYRIDOSTIGMINE BROMIDE 60 MG/1
60 TABLET ORAL 4 TIMES DAILY
Qty: 120 TABLET | Refills: 11 | Status: SHIPPED | OUTPATIENT
Start: 2018-04-10 | End: 2018-11-12 | Stop reason: SDUPTHER

## 2018-04-10 RX ORDER — BLOOD SUGAR DIAGNOSTIC
STRIP MISCELLANEOUS
Refills: 12 | COMMUNITY
Start: 2018-03-27

## 2018-04-10 RX ORDER — AZATHIOPRINE 50 MG/1
50 TABLET ORAL 2 TIMES DAILY
Qty: 60 TABLET | Refills: 11 | Status: SHIPPED | OUTPATIENT
Start: 2018-04-10 | End: 2018-11-12 | Stop reason: SDUPTHER

## 2018-04-10 NOTE — PATIENT INSTRUCTIONS
PLEASE START IMURAN (AZATHIOPRINE) 50 MG TWICE A DAY THE Monday AFTER IVIG INFUSION    PLEASE STOP TAKING TIMESPAN (MESTINON EXTENDED RELEASE) THE AFTER IVIG INFUSION    PLEASE START TAKING MESTINON (PYRIDOSTIGMINE) 60 MG 3-4 TIMES PER DAY    CARDIOTHORACIC SURGERY REFERRAL    IVIG INFUSIONS EVERY 5 WEEKS    Myasthenia Gravis IMPORTANT INFORMATION:      Medication warning list:          1. Absolute contraindication   curare,   d-penicillamine,   botulinum toxin,   interferon alpha    2. Contraindicated    a. Antibiotics-- aminoglycosides (gentamycin, kanamycin, neomycin, streptomycin, tobramycine); macrolides (erythromycin, azithromycin (Z-pack), telithromycin, Biaxin)  Fluoroquinolones ( ciprofloxacin, norfloxacin, levofloxacin);  b. quinine, quinidine, procainamide,  c. magnesium salts, iv magnesium replacement.    3. Caution- may exacerbate weakness in some myasthenics  a. Calcium channel blockers  b. Beta blockers  c. Lithium  d. Statins  e. Iodinated contrast agents  F. benzodiazepines

## 2018-04-10 NOTE — PROGRESS NOTES
Patient Name: Otoniel Candelario  MRN: [id number]    CC: Generalized weakness, blurry vision, Myasthenia gravis     Thymic status- CT scan in 2017- no evidence of thymoma (has not seen CTS)  Maintenance: IVIG q5w, prednisone 30 mg daily  Last dose change: IVIG to [id number], mestinon 60 mg tid-qid  Rescue: IVIG   Prior immunemodulatory agents: None    Interval Hx- Patient endorses fatigue, complains of not being as active and is sluggish. Continues to cramp at night in legs and hands. Continues to feel aches and cramps in muscles. Experiencing ptosis ~ 2 days ago (either eye). Experiencing SOB with exertion. Having diarrhea, but same as before. Has not had IVIG in 6 weeks. States he is really feeling the effects of not having it. Head feels heavier at the end of the day. Arms and legs feel tired as well at end of the day. Has trouble going up steps since last IVIG infusion.     Interval Hx- Patient states that he has had significant improvement since receiving home IVIG. Improved SOB and chewing issues. Decreased muscle fatigue, has been working outside in the yard. States that he has been having BLE muscle cramps mostly at night time. Also has what sounds like dystonia in upper ext at nighttime. States that he feels that timespan is less effective than when he was taking mestinon.       HPI: Otoniel Candelario is a 67 y.o. male with Pmhx of HTN, DM, HLD, MG (Ach-R ab +) presents to neuromuscular clinic to establish care as he was recently diagnosed with MG in March, 2017. Patient started noticing drooping eyelids ~ 2 years ago. Patient also started experiencing neck weakness, choking on food/water, slurred speech and generalized weakness and fatigue during the end of the day. Patient also complains of diplopia, dyspnea and blurry vision. Patient referred by Dr. Carlos for further management of MG. Patient has received IVIG in the end of August- states that he started noticing improvement after a week. He reported improvement in  chewing and vision. Patient's current regimen is Prednisone 10 mg (down from 20 mg) and timespan 180 mg bid. Patient states that he has felt increasing shortness of breath since decreasing prednisone dose down to 10 mg. Patient endorses chewing issues fatigue, blurriness of vision, swallowing issues with food and water, and lower ext weakness.       EMG- electrographic evidence of decremental response to repetative nerve stimulation      ROS:   Review of Systems   Constitutional: + for malaise/fatigue. Negative for weight loss.   HENT: Negative for hearing loss.   Eyes: Negative for blurred vision and double vision.   Respiratory: + for shortness of breath and -ve for stridor.   Cardiovascular: Negative for chest pain and palpitations.   Gastrointestinal: Negative for nausea, vomiting and constipation.   Genitourinary: Negative for frequency. Negative for urgency.   Musculoskeletal: Negative for joint pain. Negative for myalgias and falls.   Skin: Negative for rash.   Neurological: Negative for dizziness and tremors. Negative for focal weakness and seizures.   Endo/Heme/Allergies: Does not bruise/bleed easily.   Psychiatric/Behavioral: Negative for memory loss. Negative for depression and hallucinations. The patient is somewhat nervous/anxious.      Past Medical History  No past medical history on file.    Medications    Current Outpatient Prescriptions:     alfuzosin (UROXATRAL) 10 mg Tb24, Take 10 mg by mouth once daily., Disp: , Rfl: 6    atorvastatin (LIPITOR) 80 MG tablet, 80 mg., Disp: , Rfl:     isosorbide mononitrate (IMDUR) 30 MG 24 hr tablet, 30 mg., Disp: , Rfl:     lisinopril (PRINIVIL,ZESTRIL) 40 MG tablet, 40 mg., Disp: , Rfl:     metFORMIN (GLUCOPHAGE) 500 MG tablet, Take 500 mg by mouth 2 (two) times daily., Disp: , Rfl: 3    omeprazole (PRILOSEC) 20 MG capsule, Take 1 capsule (20 mg total) by mouth once daily., Disp: 30 capsule, Rfl: 11    predniSONE (DELTASONE) 20 MG tablet, Take 20 mg by  mouth once daily., Disp: , Rfl: 5    pyridostigmine (MESTINON) 180 mg TbSR, Take 1 tablet (180 mg total) by mouth every evening., Disp: 30 tablet, Rfl: 5    pyridostigmine (MESTINON) 60 mg Tab, Take 1 tablet (60 mg total) by mouth 2 (two) times daily. Take 1 tablet in morning and early afternoon., Disp: 60 tablet, Rfl: 11    sertraline (ZOLOFT) 50 MG tablet, TAKE 1/2 TABLET BY MOUTH AT BEDTIME, Disp: 15 tablet, Rfl: 3    Allergies  Review of patient's allergies indicates:  No Known Allergies    Social History  Social History     Social History    Marital status:      Spouse name: N/A    Number of children: N/A    Years of education: N/A     Occupational History    Not on file.     Social History Main Topics    Smoking status: Current Some Day Smoker     Types: Cigars    Smokeless tobacco: Never Used    Alcohol use Not on file    Drug use: Unknown    Sexual activity: Not on file     Other Topics Concern    Not on file     Social History Narrative    No narrative on file       Family History  No family history on file.    Physical Exam  Ht 6' (1.829 m)   Wt 104.3 kg (229 lb 15 oz)   BMI 31.19 kg/m²     Myasthenia Gravis Activities of Daily Living Scale     Grade   Function 0 1 2 3   Double Vision None Occasional, not every day Daily, but not constant Constant    x      Eyelid Droop None Occasional, not every day Daily, but not constant Constant    x      Talking Normal Intermittent slurring or nasal speech Constant slurring or nasal speech, but can be understood Speech difficult to understand    x      Chewing Normal Fatigues with solid food Fatigues with soft food Gastric tube    x      Swallowing Normal Chokes rarely Frequent choking requiring change of diet Gastric tube     x     Breathing Normal Shortness of breath on exertion Shortness of breath at rest Ventilator     x     Brushing teeth or hair Normal Requires extra effort but requires no rest period Rest periods needed Cannot do one or  more of these functions    x      Ability to rise from chair or toilet Normal Sometimes uses arms Always uses arms Requires assistance     x     Total MG ADL Score 3     MG Composite Scale  Ptosis, upward gaze (physician examination)   > 45 seconds   = 0 11 - 45 seconds  = 1 1 - 10 seconds  = 2 Immediate   = 3       3   Double vision on lateral gaze,   left or right   (physician examination) > 45 seconds  = 0 11 - 45 seconds  = 1 1 - 10 seconds  = 2 Immediate   = 3       3   Eye closure   (physician examination) Normal   = 0 Mild weakness   (forced open with effort)  = 0 Moderate weakness   (forced open easily)   = 1 Severe weakness (unable to keep eyes closed)  = 2    0      Talking   (patient history) Normal   = 0 Intermittent slurring or nasal speech  = 2 Constant slurring or nasal but can be understood   = 4 Difficult to understand speech   = 6    0      Chewing   (patient history) Normal   = 0 Fatigue with solid food   = 2 Fatigue with soft food  = 4 Gastric tube  = 6    0      Swallowing   (patient history) Normal   = 0 Rare episode of choking or trouble swallowing   = 2 Frequent trouble swallowing   = 5 Gastric tube   = 6     2     Breathing   (thought to be caused by MG) Normal   = 0 Shortness of breath with exertion   = 2 Shortness of breath at rest   = 4 Ventilator dependence   = 9     2     Neck flexion or extension (weakest) (physician examination) Normal   = 0 Mild weakness   = 1 Moderate weakness (~50% +/- 15%)  = 3 Severe weakness = 4    0      Shoulder abduction (physician examination) Normal   = 0 Mild weakness  = 1 Moderate weakness   (~50% +/- 15%)  = 4 Severe weakness = 5    0      Hip flexion   (physician examination) Normal   = 0 Mild weakness   = 2 Moderate weakness   (~50% +/- 15%)  = 4 Severe weakness = 5    0      Total MGC Score 10     Myasthenia Gravis Manual Muscle Testing  0 = Normal / no impairment  1 = 25% weak / mild impairment l 2 = 50% weak / moderate impairment  3 = 75% weak /  severe impairment l 4 = paralyzed / unable to do  Cranial Right Left Sum   Eyelid ptosis 0 0 0   Diplopia 0 0 0   Eye Closure  0   Cheek Puff  0   Jaw Closure  0   Tongue Protrusion  0   Cranial Muscle Score    Limb    Neck flexion  0   Neck extension  0   Shoulder abduction 0 0 0   Elbow flexion 0 0    Elbow extension 0 0    Wrist extension 0 0     0 0    Hip flexion 0 1 1   Knee extension 0 0    Knee flexion 0 0    Ankle dorsiflexion 0 0    Ankle plantarflexion 0 0    Limb Muscle Score 1   Total MMT Score 1     Myasthenia Gravis- Quality of Life Score (revised) - MG QoL-15r  Please indicate how true each statement has been (over the past four weeks).   Not at all Somewhat Very Much    0 1 2   1. I am frustrated by my MG    x    2. I have trouble with my eyes because of my MG (e.g. double vision)    x    3. I have trouble eating because of MG   x     4. I have limited my social activity because of my MG   x     5. My MG limits my ability to enjoy hobbies and fun activities   x     6. I have trouble meeting the needs of my family because of my MG   x     7. I have to make plans around my MG    x    8. I am bothered by limitations in performing my work (include work at home) because of my MG  x    9. I have difficulty speaking due to MG   x     10. I have lost some personal independence because of my MG (e.g. driving, shopping, running errands)  x    11. I am depressed about my MG    x    12. I have trouble walking due to MG    x    13. I have trouble getting around public places because of my MG   x     14. I feel overwhelmed by my MG   x     15. I have trouble performing my personal grooming needs due to MG   x      Total MG-QoL15r Score 7         General appearance: Well-developed, well-groomed, bilateral ptosis has improved.     Neurologic Exam: The patient is awake, alert and oriented. Language is fluent. Recent and remote memory are normal. Attention span and concentration are normal. Fund of knowledge is  appropriate.     Cranial nerves: pupils are round and reactive to light and accommodation. Visual fields are full to confrontation. Fundoscopic exam reveals sharp discs bilaterally, with good venous pulsations. Ocular motility is full in all cardinal positions of gaze. Facial sensation is normal to pinprick and light touch. Corneal reflexes are present bilaterally. Facial activation is symmetric. Hearing is normal bilaterally. Palate elevates symmetrically and gag reflex is intact bilaterally. Shoulder elevation is symmetric and full strength bilaterally. Tongue is midline and neck rotation strength is normal bilaterally. Neck range of motion is normal.     Motor examination of all extremities demonstrates normal bulk and tone in all four limbs. There are no atrophy or fasciculations. No neck flexion or ext weakness. Shoulder AB/ADduction 5/5    Sensory examination is normal to pinprick, vibration and proprioception in the upper and lower extremities bilaterally. Romberg is negative.    Deep tendon reflexes are 2+ and symmetric in the upper and lower extremities bilaterally. Toes are mute bilaterally.     Gait: Trouble tandem and heel walking    Coordination: Finger to nose and heel to shin testing is normal in both upper and lower extremities. Rapid alternating movements are normal in both upper and lower extremities.     Single breath count- 25      General exam  Cardiovascular: regular rate and rhythm with no murmurs, rubs or gallops. There are no carotid or vertebral artery bruits. Pulses in both upper and lower extremities are symmetric. There is no peripheral edema.   Head and neck: no cervical lymphadenopathy        Lab and Test Results    WBC   Date Value Ref Range Status   03/13/2018 12.34 3.90 - 12.70 K/uL Final   10/10/2017 11.63 3.90 - 12.70 K/uL Final     Hemoglobin   Date Value Ref Range Status   03/13/2018 12.9 (L) 14.0 - 18.0 g/dL Final   10/10/2017 13.8 (L) 14.0 - 18.0 g/dL Final     Hematocrit    Date Value Ref Range Status   03/13/2018 38.8 (L) 40.0 - 54.0 % Final   10/10/2017 41.2 40.0 - 54.0 % Final     Platelets   Date Value Ref Range Status   03/13/2018 161 150 - 350 K/uL Final   10/10/2017 236 150 - 350 K/uL Final     Glucose   Date Value Ref Range Status   03/13/2018 330 (H) 70 - 110 mg/dL Final   10/10/2017 185 (H) 70 - 110 mg/dL Final     Sodium   Date Value Ref Range Status   03/13/2018 135 (L) 136 - 145 mmol/L Final   10/10/2017 141 136 - 145 mmol/L Final     Potassium   Date Value Ref Range Status   03/13/2018 4.2 3.5 - 5.1 mmol/L Final   10/10/2017 4.1 3.5 - 5.1 mmol/L Final     Chloride   Date Value Ref Range Status   03/13/2018 98 95 - 110 mmol/L Final   10/10/2017 100 95 - 110 mmol/L Final     CO2   Date Value Ref Range Status   03/13/2018 29 23 - 29 mmol/L Final   10/10/2017 31 (H) 23 - 29 mmol/L Final     BUN, Bld   Date Value Ref Range Status   03/13/2018 14 8 - 23 mg/dL Final   10/10/2017 12 8 - 23 mg/dL Final     Creatinine   Date Value Ref Range Status   03/13/2018 1.0 0.5 - 1.4 mg/dL Final   10/10/2017 0.9 0.5 - 1.4 mg/dL Final     Calcium   Date Value Ref Range Status   03/13/2018 9.4 8.7 - 10.5 mg/dL Final   10/10/2017 9.2 8.7 - 10.5 mg/dL Final     Alkaline Phosphatase   Date Value Ref Range Status   03/13/2018 67 55 - 135 U/L Final   10/10/2017 89 55 - 135 U/L Final     ALT   Date Value Ref Range Status   03/13/2018 34 10 - 44 U/L Final   10/10/2017 39 10 - 44 U/L Final     AST   Date Value Ref Range Status   03/13/2018 31 10 - 40 U/L Final   10/10/2017 29 10 - 40 U/L Final         Images: CT Chest- no evidence of thymoma    Independently reviewed NO    Other Tests    Assessment and Plan    Otoniel Candelario is a 67 y.o. 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 Patient experiencing sob at rest and has historically had trouble with chewing (resolved since treatment initiated). Will increase his amount of prednisone and obtain labs including other antibody panel assoc with MG.  Patient with significant improvement in symptoms (choking, SOB, weakness) since starting IVIG. Will spread out IVIG to [id number]. When patient seen prior to next IVIG infusion (~6 weeks out from last infusion), patient had a change in MGC score from 3--->10. Patient inc SOB and fatigued. Will require IVIG infusions every 5 weeks along with change in mestinon regimen. Will start patient on imuran once IVIG completed. Patient also will have labs drawn for RLS.     CTS referral  Continue Prednsione to 30 mg Daily for now  D/c timespan (after IVIG)  Mestinon 60 mg tid-qid (after IVIG)  Change frequency of IVIG to q5 weeks  TPMT- nml  Taking zoloft for anxiety  Omeprazole OTC while on steroids  Will need to coordinate with PCP or endocrinology for BG control on increase steroid.   Annual derm exam due to Imuran  Start Imuran 50 mg bid (after IVIG)  Ferratin, TIBC, Fe- RLS?     UPDATE: Patient with elevated WBC count with inc in immature granulocytes. I have contacted the patient and instructed him to follow with his PCP to r/o infection. Patient will hold off on starting immuno-mod therapy with imuran until infection is ruled out.     RTC IN 8 WEEKS    Myasthenia Gravis IMPORTANT INFORMATION:    Elective intubation should be considered if serial measurements of the VC show values less than 20 mL/kg or if the MIF is worse than -30 cmH20.    Medication warning list:          1. Absolute contraindication   curare,   d-penicillamine,   botulinum toxin,   interferon alpha    2. Contraindicated    a. Antibiotics-- aminoglycosides (gentamycin, kanamycin, neomycin, streptomycin, tobramycine); macrolides (erythromycin, azithromycin (Z-pack), telithromycin, Biaxin)  Fluoroquinolones ( ciprofloxacin, norfloxacin, levofloxacin);  b. quinine, quinidine, procainamide,  c. magnesium salts, iv magnesium replacement.    3. Caution- may exacerbate weakness in some myasthenics  a. Calcium channel blockers  b. Beta blockers  c. Lithium  d.  Statins  e. Iodinated contrast agents  F. benzodiazepines        Valarie Noe Zacarias MD  Neurology Resident   [facility]  [address] [place] [address]  Pager: [phone]      I have personally taken the history and examined the patient and agree with the resident's note as stated above.    Phu Kaur MD, TRUPTI, FAAN  Department of Neurology   [facility] [place], [place]

## 2018-04-12 ENCOUNTER — TELEPHONE (OUTPATIENT)
Dept: NEUROLOGY | Facility: CLINIC | Age: 68
End: 2018-04-12

## 2018-04-12 PROBLEM — R06.02 SOB (SHORTNESS OF BREATH): Status: ACTIVE | Noted: 2018-04-12

## 2018-04-12 PROBLEM — Z79.899 LONG-TERM CURRENT USE OF INTRAVENOUS IMMUNOGLOBULIN (IVIG): Status: ACTIVE | Noted: 2018-04-12

## 2018-04-12 NOTE — TELEPHONE ENCOUNTER
----- Message from Anushka Gaona sent at 4/12/2018  2:52 PM CDT -----  Contact: NATALIYA Sorto   Macario is calling to speak with Staff for a verbal authorization.    He can be reached at [phone].    Thank you.

## 2018-04-12 NOTE — TELEPHONE ENCOUNTER
----- Message from Jethro Juan sent at 4/12/2018 12:00 PM CDT -----  Contact: Self @ [phone]  Pt wanted to let the nurse know Dr. Elliott is asking for the blood work results/next steps to take regarding pt's white blood cell count to fax .

## 2018-04-13 NOTE — TELEPHONE ENCOUNTER
----- Message from Bubba Srivastava sent at 4/13/2018 12:13 PM CDT -----  Contact:  [facility] Prior Authorization  @ [phone]  Caller is calling to get an update on the prior authorization for ( FIORELLA-CNae ) pls call to update

## 2018-04-13 NOTE — TELEPHONE ENCOUNTER
----- Message from Claudia Hernandez sent at 4/13/2018  9:14 AM CDT -----  Contact: Sandra([facility]) @ [phone] (VERBAL)  Calling to speak with someone in / Rell's office regarding a prior authorization for medication: GAMUMEX-C. Please call.

## 2018-04-26 DIAGNOSIS — G70.00 MYASTHENIA GRAVIS: Primary | ICD-10-CM

## 2018-04-26 RX ORDER — PREDNISONE 20 MG/1
30 TABLET ORAL DAILY
Qty: 45 TABLET | Refills: 5 | Status: SHIPPED | OUTPATIENT
Start: 2018-04-26 | End: 2018-11-27

## 2018-04-26 NOTE — TELEPHONE ENCOUNTER
----- Message from Claudia Hernandez sent at 4/26/2018 10:24 AM CDT -----  Contact: pt @ [phone]  Calling for directions on taking the medication:  azaTHIOprine (IMURAN) 50 mg Tab. Asking to speak with someone regarding when to start taking the medicine. Please call.

## 2018-04-26 NOTE — TELEPHONE ENCOUNTER
----- Message from Claudia Hernandez sent at 4/26/2018 10:24 AM CDT -----  Contact: pt @ [phone]  Calling for directions on taking the medication:  azaTHIOprine (IMURAN) 50 mg Tab. Asking to speak with someone regarding when to start taking the medicine. Please call.

## 2018-04-27 RX ORDER — SERTRALINE HYDROCHLORIDE 50 MG/1
TABLET, FILM COATED ORAL
Qty: 15 TABLET | Refills: 3 | Status: SHIPPED | OUTPATIENT
Start: 2018-04-27 | End: 2018-08-28

## 2018-05-03 ENCOUNTER — TELEPHONE (OUTPATIENT)
Dept: NEUROLOGY | Facility: CLINIC | Age: 68
End: 2018-05-03

## 2018-05-03 NOTE — TELEPHONE ENCOUNTER
----- Message from Jethro Juan sent at 5/2/2018  8:44 AM CDT -----  Pharmacy Calling    Name of Caller: Macario  Pharmacy Name: [facility]  Prescription Name: Gamunex C (did not see in chart)  What do they need to clarify?: They're calling to confirm request for PA was received, which was faxed everyday this week up to today  Communication Preference (Mortezat response to Pt. (or) Call Back # and timeframe): [phone], ext [phone]  Additional Information:

## 2018-05-09 ENCOUNTER — TELEPHONE (OUTPATIENT)
Dept: NEUROLOGY | Facility: CLINIC | Age: 68
End: 2018-05-09

## 2018-05-09 NOTE — TELEPHONE ENCOUNTER
----- Message from Bianca Cordova sent at 2018  4:42 PM CDT -----  Contact: Macario AN [facility]    [phone] ext [phone]  Calling for the status of pts prior auth for Gamunex C.  This will  on 18.  Pls call.

## 2018-05-10 NOTE — TELEPHONE ENCOUNTER
Received call from Zora with [facility] Prior Authorizations. Verbal PA completed for IVIG. Response will be faxed to [phone] within 24-48 business hours.

## 2018-05-10 NOTE — TELEPHONE ENCOUNTER
----- Message from Suzanne Treadwell sent at 5/9/2018  5:44 PM CDT -----  Contact: Pharmacy  Infusion medication is needing refill by phone    Callback: [phone]  Ext - [phone]

## 2018-06-06 ENCOUNTER — TELEPHONE (OUTPATIENT)
Dept: CARDIOTHORACIC SURGERY | Facility: CLINIC | Age: 68
End: 2018-06-06

## 2018-06-06 ENCOUNTER — OFFICE VISIT (OUTPATIENT)
Dept: NEUROLOGY | Facility: CLINIC | Age: 68
End: 2018-06-06
Payer: MEDICARE

## 2018-06-06 ENCOUNTER — HOSPITAL ENCOUNTER (OUTPATIENT)
Dept: PULMONOLOGY | Facility: CLINIC | Age: 68
Discharge: HOME | End: 2018-06-06
Payer: OTHER GOVERNMENT

## 2018-06-06 ENCOUNTER — OFFICE VISIT (OUTPATIENT)
Dept: CARDIOTHORACIC SURGERY | Facility: CLINIC | Age: 68
End: 2018-06-06
Payer: MEDICARE

## 2018-06-06 VITALS
BODY MASS INDEX: 32.03 KG/M2 | WEIGHT: 228.81 LBS | HEIGHT: 71 IN | HEART RATE: 70 BPM | SYSTOLIC BLOOD PRESSURE: 156 MMHG | DIASTOLIC BLOOD PRESSURE: 83 MMHG

## 2018-06-06 VITALS
WEIGHT: 231.06 LBS | OXYGEN SATURATION: 95 % | DIASTOLIC BLOOD PRESSURE: 92 MMHG | HEIGHT: 72 IN | SYSTOLIC BLOOD PRESSURE: 162 MMHG | HEART RATE: 70 BPM | BODY MASS INDEX: 31.3 KG/M2

## 2018-06-06 DIAGNOSIS — G70.00 MYASTHENIA GRAVIS: ICD-10-CM

## 2018-06-06 DIAGNOSIS — G70.00 MYASTHENIA GRAVIS: Primary | ICD-10-CM

## 2018-06-06 DIAGNOSIS — G70.00 MG (MYASTHENIA GRAVIS): Primary | ICD-10-CM

## 2018-06-06 LAB
POST FEV1 FVC: 0.84
POST FEV1: 3.12
POST FVC: 3.73
PRE FEV1 FVC: 81
PRE FEV1: 2.8
PRE FVC: 3.44
PREDICTED FEV1 FVC: 79
PREDICTED FEV1: 3.37
PREDICTED FVC: 4.21

## 2018-06-06 PROCEDURE — 94060 EVALUATION OF WHEEZING: CPT | Mod: S$GLB,,, | Performed by: INTERNAL MEDICINE

## 2018-06-06 PROCEDURE — 99999 PR PBB SHADOW E&M-EST. PATIENT-LVL IV: CPT | Mod: PBBFAC,,, | Performed by: THORACIC SURGERY (CARDIOTHORACIC VASCULAR SURGERY)

## 2018-06-06 PROCEDURE — 94729 DIFFUSING CAPACITY: CPT | Mod: S$GLB,,, | Performed by: INTERNAL MEDICINE

## 2018-06-06 PROCEDURE — 99215 OFFICE O/P EST HI 40 MIN: CPT | Mod: S$GLB,,, | Performed by: PSYCHIATRY & NEUROLOGY

## 2018-06-06 PROCEDURE — 99999 PR PBB SHADOW E&M-EST. PATIENT-LVL III: CPT | Mod: PBBFAC,,, | Performed by: PSYCHIATRY & NEUROLOGY

## 2018-06-06 PROCEDURE — 94727 GAS DIL/WSHOT DETER LNG VOL: CPT | Mod: S$GLB,,, | Performed by: INTERNAL MEDICINE

## 2018-06-06 PROCEDURE — 99204 OFFICE O/P NEW MOD 45 MIN: CPT | Mod: S$GLB,,, | Performed by: THORACIC SURGERY (CARDIOTHORACIC VASCULAR SURGERY)

## 2018-06-06 NOTE — TELEPHONE ENCOUNTER
Called Dr. HSASHI Recio 's office ([phone]) to request a copy of recent stress test, ekg and recent progress note from provider.  Faxed request to [phone].

## 2018-06-06 NOTE — LETTER
[facility]  [address]  [facility] [address]  Phone: [phone]  Fax: [phone] June 6, 2018        Valarie Zacarias MD  [address]  [facility] [address]    Patient: Otoniel Candelario   MR Number: [id number]   YOB: 1950   Date of Visit: 6/6/2018     Dear Dr. Zacarias:    Thank you for referring Otoniel Candelario to me for evaluation. He presents for thymectomy evaluation for management of myasthenia gravis.  Mr. Candelario has mostly ocular symptoms and mild lower extremity weakness.    I reviewed the chest CT images which demonstrated normal mediastinal anatomy.  There is a thymic fat pad and no evidence of thymoma.  I recommend a robotic thymectomy.  I will obtain PFTs and a stress echocardiogram.  Mr. Candelario should receive IVIG 1-2 weeks prior to surgery.     If you have questions, please do not hesitate to call me. I look forward to following Otoniel along with you.    Sincerely,      Ricky Hernandez MD, OLIVIA, FACS  Department of Surgery  Section of Cardiothoracic Surgery        CC  Wni Elliott MD

## 2018-06-06 NOTE — PROGRESS NOTES
Patient Name: Otoniel Candelario  MRN: [id number]    CC: Generalized weakness, blurry vision, Myasthenia gravis     Thymic status- CT scan in 2017- no evidence of thymoma (thymectomy scheduled for July 16th,2018)  Maintenance: IVIG q5w, prednisone 30 mg daily  Last dose change: IVIG to [id number], mestinon 60 mg tid-qid  Rescue: IVIG   Prior immunemodulatory agents: None    Interval Hx- Feeling of fatigue and sluggishness gone since changing IVIG to q5 weeks. Last IVIG was 1.5 weeks ago. No ptosis and much improved SOB w exertion. Feels fine going up the stairs now.     Interval Hx- Patient endorses fatigue, complains of not being as active and is sluggish. Continues to cramp at night in legs and hands. Continues to feel aches and cramps in muscles. Experiencing ptosis ~ 2 days ago (either eye). Experiencing SOB with exertion. Having diarrhea, but same as before. Has not had IVIG in 6 weeks. States he is really feeling the effects of not having it. Head feels heavier at the end of the day. Arms and legs feel tired as well at end of the day. Has trouble going up steps since last IVIG infusion.     Interval Hx- Patient states that he has had significant improvement since receiving home IVIG. Improved SOB and chewing issues. Decreased muscle fatigue, has been working outside in the yard. States that he has been having BLE muscle cramps mostly at night time. Also has what sounds like dystonia in upper ext at nighttime. States that he feels that timespan is less effective than when he was taking mestinon.       HPI: Otoniel Candelario is a 67 y.o. male with Pmhx of HTN, DM, HLD, MG (Ach-R ab +) presents to neuromuscular clinic to establish care as he was recently diagnosed with MG in March, 2017. Patient started noticing drooping eyelids ~ 2 years ago. Patient also started experiencing neck weakness, choking on food/water, slurred speech and generalized weakness and fatigue during the end of the day. Patient also complains of diplopia,  dyspnea and blurry vision. Patient referred by Dr. Carlos for further management of MG. Patient has received IVIG in the end of August- states that he started noticing improvement after a week. He reported improvement in chewing and vision. Patient's current regimen is Prednisone 10 mg (down from 20 mg) and timespan 180 mg bid. Patient states that he has felt increasing shortness of breath since decreasing prednisone dose down to 10 mg. Patient endorses chewing issues fatigue, blurriness of vision, swallowing issues with food and water, and lower ext weakness.       EMG- electrographic evidence of decremental response to repetative nerve stimulation      ROS:   Review of Systems   Constitutional: + for malaise/fatigue. Negative for weight loss.   Respiratory: + for shortness of breath and -ve for stridor.   Skin: Negative for rash.   Neurological: Negative for dizziness and tremors. Negative for focal weakness and seizures.   Endo/Heme/Allergies: Does not bruise/bleed easily.   Psychiatric/Behavioral: Negative for memory loss. Negative for depression and hallucinations.       Past Medical History  No past medical history on file.    Medications    Current Outpatient Prescriptions:     atorvastatin (LIPITOR) 80 MG tablet, 80 mg., Disp: , Rfl:     azaTHIOprine (IMURAN) 50 mg Tab, Take 1 tablet (50 mg total) by mouth 2 (two) times daily. Start AFTER IVIG infusion (monday), Disp: 60 tablet, Rfl: 11    isosorbide mononitrate (IMDUR) 30 MG 24 hr tablet, 30 mg., Disp: , Rfl:     lisinopril (PRINIVIL,ZESTRIL) 40 MG tablet, 40 mg., Disp: , Rfl:     metFORMIN (GLUCOPHAGE) 500 MG tablet, Take 500 mg by mouth 2 (two) times daily., Disp: , Rfl: 3    omeprazole (PRILOSEC) 20 MG capsule, Take 1 capsule (20 mg total) by mouth once daily., Disp: 30 capsule, Rfl: 11    ONETOUCH DELICA LANCETS 30 gauge Misc, TEST BLOOD SUGAR ONCE DAILY, Disp: , Rfl: 0    ONETOUCH ULTRA TEST Strp, TEST BLOOD SUGAR ONCE DAILY, Disp: , Rfl:  "12    ONETOUCH ULTRA2 kit, USE TO TEST BLOOD GLUCOSE DAILY, Disp: , Rfl: 0    predniSONE (DELTASONE) 20 MG tablet, Take 1.5 tablets (30 mg total) by mouth once daily. Take 30 mg (1 and a half pills) daily., Disp: 45 tablet, Rfl: 5    pyridostigmine (MESTINON) 60 mg Tab, Take 1 tablet (60 mg total) by mouth 4 (four) times daily. Take mestinon 3-4 times per day., Disp: 120 tablet, Rfl: 11    sertraline (ZOLOFT) 50 MG tablet, TAKE 1/2 TABLET BY MOUTH AT BEDTIME, Disp: 15 tablet, Rfl: 3    Allergies  Review of patient's allergies indicates:  No Known Allergies    Social History  Social History     Social History    Marital status:      Spouse name: N/A    Number of children: N/A    Years of education: N/A     Occupational History    Not on file.     Social History Main Topics    Smoking status: Current Some Day Smoker     Types: Cigars    Smokeless tobacco: Never Used    Alcohol use Not on file    Drug use: Unknown    Sexual activity: Not on file     Other Topics Concern    Not on file     Social History Narrative    No narrative on file       Family History  No family history on file.    Physical Exam  BP (!) 156/83   Pulse 70   Ht 5' 11" (1.803 m)   Wt 103.8 kg (228 lb 13.4 oz)   BMI 31.92 kg/m²     Myasthenia Gravis Activities of Daily Living Scale     Grade   Function 0 1 2 3   Double Vision None Occasional, not every day Daily, but not constant Constant    x      Eyelid Droop None Occasional, not every day Daily, but not constant Constant    x      Talking Normal Intermittent slurring or nasal speech Constant slurring or nasal speech, but can be understood Speech difficult to understand    x      Chewing Normal Fatigues with solid food Fatigues with soft food Gastric tube    x      Swallowing Normal Chokes rarely Frequent choking requiring change of diet Gastric tube     x     Breathing Normal Shortness of breath on exertion Shortness of breath at rest Ventilator     x     Brushing teeth " or hair Normal Requires extra effort but requires no rest period Rest periods needed Cannot do one or more of these functions    x      Ability to rise from chair or toilet Normal Sometimes uses arms Always uses arms Requires assistance     x     Total MG ADL Score 3     MG Composite Scale  Ptosis, upward gaze (physician examination)   > 45 seconds   = 0 11 - 45 seconds  = 1 1 - 10 seconds  = 2 Immediate   = 3       3   Double vision on lateral gaze,   left or right   (physician examination) > 45 seconds  = 0 11 - 45 seconds  = 1 1 - 10 seconds  = 2 Immediate   = 3     1     Eye closure   (physician examination) Normal   = 0 Mild weakness   (forced open with effort)  = 0 Moderate weakness   (forced open easily)   = 1 Severe weakness (unable to keep eyes closed)  = 2    0      Talking   (patient history) Normal   = 0 Intermittent slurring or nasal speech  = 2 Constant slurring or nasal but can be understood   = 4 Difficult to understand speech   = 6    0      Chewing   (patient history) Normal   = 0 Fatigue with solid food   = 2 Fatigue with soft food  = 4 Gastric tube  = 6    0      Swallowing   (patient history) Normal   = 0 Rare episode of choking or trouble swallowing   = 2 Frequent trouble swallowing   = 5 Gastric tube   = 6     2     Breathing   (thought to be caused by MG) Normal   = 0 Shortness of breath with exertion   = 2 Shortness of breath at rest   = 4 Ventilator dependence   = 9    0      Neck flexion or extension (weakest) (physician examination) Normal   = 0 Mild weakness   = 1 Moderate weakness (~50% +/- 15%)  = 3 Severe weakness = 4    0      Shoulder abduction (physician examination) Normal   = 0 Mild weakness  = 1 Moderate weakness   (~50% +/- 15%)  = 4 Severe weakness = 5    0      Hip flexion   (physician examination) Normal   = 0 Mild weakness   = 2 Moderate weakness   (~50% +/- 15%)  = 4 Severe weakness = 5    0      Total MGC Score 6     Myasthenia Gravis Manual Muscle Testing  0 = Normal /  no impairment  1 = 25% weak / mild impairment l 2 = 50% weak / moderate impairment  3 = 75% weak / severe impairment l 4 = paralyzed / unable to do  Cranial Right Left Sum   Eyelid ptosis 0 0 0   Diplopia 0 0 0   Eye Closure  0   Cheek Puff  0   Jaw Closure  0   Tongue Protrusion  0   Cranial Muscle Score    Limb    Neck flexion  0   Neck extension  0   Shoulder abduction 0 0 0   Elbow flexion 0 0    Elbow extension 0 0    Wrist extension 0 0     0 0    Hip flexion 1 0 1   Knee extension 0 0    Knee flexion 0 0    Ankle dorsiflexion 0 0    Ankle plantarflexion 0 0    Limb Muscle Score 1   Total MMT Score 1     Myasthenia Gravis- Quality of Life Score (revised) - MG QoL-15r  Please indicate how true each statement has been (over the past four weeks).   Not at all Somewhat Very Much    0 1 2   1. I am frustrated by my MG    x    2. I have trouble with my eyes because of my MG (e.g. double vision)    x    3. I have trouble eating because of MG   x     4. I have limited my social activity because of my MG   x     5. My MG limits my ability to enjoy hobbies and fun activities   x     6. I have trouble meeting the needs of my family because of my MG   x     7. I have to make plans around my MG    x    8. I am bothered by limitations in performing my work (include work at home) because of my MG  x    9. I have difficulty speaking due to MG   x     10. I have lost some personal independence because of my MG (e.g. driving, shopping, running errands)  x    11. I am depressed about my MG    x    12. I have trouble walking due to MG    x    13. I have trouble getting around public places because of my MG   x     14. I feel overwhelmed by my MG   x     15. I have trouble performing my personal grooming needs due to MG   x      Total MG-QoL15r Score 7         General appearance: Well-developed, well-groomed, bilateral ptosis has improved.     Neurologic Exam: The patient is awake, alert and oriented. Language is fluent.  Recent and remote memory are normal. Attention span and concentration are normal. Fund of knowledge is appropriate.     Cranial nerves: pupils are round and reactive to light and accommodation. Visual fields are full to confrontation. Fundoscopic exam reveals sharp discs bilaterally, with good venous pulsations. Ocular motility is full in all cardinal positions of gaze. Facial sensation is normal to pinprick and light touch. Corneal reflexes are present bilaterally. Facial activation is symmetric. Hearing is normal bilaterally. Palate elevates symmetrically and gag reflex is intact bilaterally. Shoulder elevation is symmetric and full strength bilaterally. Tongue is midline and neck rotation strength is normal bilaterally. Neck range of motion is normal.     Motor examination of all extremities demonstrates normal bulk and tone in all four limbs. There are no atrophy or fasciculations. No neck flexion or ext weakness. Shoulder AB/ADduction 5/5. L HF 4+/5    Sensory examination is normal to pinprick, vibration and proprioception in the upper and lower extremities bilaterally. Romberg is negative.    Deep tendon reflexes are 2+ and symmetric in the upper and lower extremities bilaterally. Toes are mute bilaterally.     Gait: Trouble tandem and heel walking    Coordination: Finger to nose and heel to shin testing is normal in both upper and lower extremities. Rapid alternating movements are normal in both upper and lower extremities.     Single breath count- 26      General exam  Cardiovascular: regular rate and rhythm with no murmurs, rubs or gallops. There are no carotid or vertebral artery bruits. Pulses in both upper and lower extremities are symmetric. There is no peripheral edema.   Head and neck: no cervical lymphadenopathy        Lab and Test Results    WBC   Date Value Ref Range Status   04/10/2018 14.97 (H) 3.90 - 12.70 K/uL Final   03/13/2018 12.34 3.90 - 12.70 K/uL Final   10/10/2017 11.63 3.90 - 12.70 K/uL  Final     Hemoglobin   Date Value Ref Range Status   04/10/2018 14.7 14.0 - 18.0 g/dL Final   03/13/2018 12.9 (L) 14.0 - 18.0 g/dL Final   10/10/2017 13.8 (L) 14.0 - 18.0 g/dL Final     Hematocrit   Date Value Ref Range Status   04/10/2018 43.9 40.0 - 54.0 % Final   03/13/2018 38.8 (L) 40.0 - 54.0 % Final   10/10/2017 41.2 40.0 - 54.0 % Final     Platelets   Date Value Ref Range Status   04/10/2018 229 150 - 350 K/uL Final   03/13/2018 161 150 - 350 K/uL Final   10/10/2017 236 150 - 350 K/uL Final     Glucose   Date Value Ref Range Status   04/10/2018 366 (H) 70 - 110 mg/dL Final   03/13/2018 330 (H) 70 - 110 mg/dL Final   10/10/2017 185 (H) 70 - 110 mg/dL Final     Sodium   Date Value Ref Range Status   04/10/2018 136 136 - 145 mmol/L Final   03/13/2018 135 (L) 136 - 145 mmol/L Final   10/10/2017 141 136 - 145 mmol/L Final     Potassium   Date Value Ref Range Status   04/10/2018 4.4 3.5 - 5.1 mmol/L Final   03/13/2018 4.2 3.5 - 5.1 mmol/L Final   10/10/2017 4.1 3.5 - 5.1 mmol/L Final     Chloride   Date Value Ref Range Status   04/10/2018 98 95 - 110 mmol/L Final   03/13/2018 98 95 - 110 mmol/L Final   10/10/2017 100 95 - 110 mmol/L Final     CO2   Date Value Ref Range Status   04/10/2018 29 23 - 29 mmol/L Final   03/13/2018 29 23 - 29 mmol/L Final   10/10/2017 31 (H) 23 - 29 mmol/L Final     BUN, Bld   Date Value Ref Range Status   04/10/2018 14 8 - 23 mg/dL Final   03/13/2018 14 8 - 23 mg/dL Final   10/10/2017 12 8 - 23 mg/dL Final     Creatinine   Date Value Ref Range Status   04/10/2018 1.2 0.5 - 1.4 mg/dL Final   03/13/2018 1.0 0.5 - 1.4 mg/dL Final   10/10/2017 0.9 0.5 - 1.4 mg/dL Final     Calcium   Date Value Ref Range Status   04/10/2018 9.5 8.7 - 10.5 mg/dL Final   03/13/2018 9.4 8.7 - 10.5 mg/dL Final   10/10/2017 9.2 8.7 - 10.5 mg/dL Final     Alkaline Phosphatase   Date Value Ref Range Status   04/10/2018 75 55 - 135 U/L Final   03/13/2018 67 55 - 135 U/L Final   10/10/2017 89 55 - 135 U/L Final      ALT   Date Value Ref Range Status   04/10/2018 52 (H) 10 - 44 U/L Final   03/13/2018 34 10 - 44 U/L Final   10/10/2017 39 10 - 44 U/L Final     AST   Date Value Ref Range Status   04/10/2018 37 10 - 40 U/L Final   03/13/2018 31 10 - 40 U/L Final   10/10/2017 29 10 - 40 U/L Final         Images: CT Chest- no evidence of thymoma    Independently reviewed NO    Other Tests    Assessment and Plan    Otoniel Candelario is a 67 y.o. 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 Patient experiencing sob at rest and has historically had trouble with chewing (resolved since treatment initiated). Will increase his amount of prednisone and obtain labs including other antibody panel assoc with MG. Patient with significant improvement in symptoms (choking, SOB, weakness) since starting IVIG. Will spread out IVIG to [id number]. When patient seen prior to next IVIG infusion (~6 weeks out from last infusion), patient had a change in MGC score from 3--->10. Patient inc SOB and fatigued. Will require IVIG infusions every 5 weeks along with change in mestinon regimen. Patient started on imuran with no issues. IVIG q5 weeks with resolution of weakness, ptosis and sluggishness. Patient will have robotic thymectomy in July.     Thymectomy in July  Continue Prednsione to 30 mg Daily for now  Continue Mestinon 60 mg tid-qid (after IVIG)  Continue IVIG to q5 weeks (consider 4 weeks depending on symptoms, patient states that a few days prior to last IVIG he felt a little weak- wants to watch longer)  TPMT- nml  Taking zoloft for anxiety  Omeprazole OTC while on steroids  Will need to coordinate with PCP or endocrinology for BG control on increase steroid.   Annual derm exam due to Imuran  Continue Imuran 50 mg bid (after IVIG)    Will discuss with [facility] staff regarding steroids prior to surgery.         RTC IN 8 WEEKS    Myasthenia Gravis IMPORTANT INFORMATION:    Elective intubation should be considered if serial measurements of the VC show values  less than 20 mL/kg or if the MIF is worse than -30 cmH20.    Medication warning list:          1. Absolute contraindication   curare,   d-penicillamine,   botulinum toxin,   interferon alpha    2. Contraindicated    a. Antibiotics-- aminoglycosides (gentamycin, kanamycin, neomycin, streptomycin, tobramycine); macrolides (erythromycin, azithromycin (Z-pack), telithromycin, Biaxin)  Fluoroquinolones ( ciprofloxacin, norfloxacin, levofloxacin);  b. quinine, quinidine, procainamide,  c. magnesium salts, iv magnesium replacement.    3. Caution- may exacerbate weakness in some myasthenics  a. Calcium channel blockers  b. Beta blockers  c. Lithium  d. Statins  e. Iodinated contrast agents  F. benzodiazepines        Valarie Noe Zacarias MD  Neurology Resident   [facility] [address] [place] [address]  Pager: [phone]

## 2018-06-06 NOTE — PROGRESS NOTES
History & Physical    SUBJECTIVE:     History of Present Illness:  Patient is a 67 y.o. male presents with HTN, DM, HLD, MG (Ach-R ab +) with MG in March 2017 here today for evaluation of thymectomy. Experiences generalized weakness, fatigue, ptosis, slurred speech, and choking on liquids. Symptoms largely controlled with mestinon, prednisone and IVIG. Also started on Imuran 50mg daily by neurologist. Current regimen is Prednisone 30mg, Mestinon 60mg BID-TID, and IVIG [id number]. Last IVIG 1 week ago. ecreased muscle fatigue, has been working outside in the yard. He started following with cardiologist for SOB/BETHEA prior to MG diagnosis. Also reports mild orthopnea. He believes he has had a stress test in the past. Denies prior myasthenia crisis. Never been intubated for MG. Denies blood thinners. Denies fever, chills, CP, baseline SOB, appetite or weight changes.     Smokes 1 cigar daily. Occasional ETOH.   Appendectomy, bilateral inguinal hernia repairs, vasectomy  Retired mail .     Chief Complaint   Patient presents with    Consult       Review of patient's allergies indicates:  No Known Allergies    Current Outpatient Prescriptions   Medication Sig Dispense Refill    atorvastatin (LIPITOR) 80 MG tablet 80 mg.      azaTHIOprine (IMURAN) 50 mg Tab Take 1 tablet (50 mg total) by mouth 2 (two) times daily. Start AFTER IVIG infusion (monday) 60 tablet 11    isosorbide mononitrate (IMDUR) 30 MG 24 hr tablet 30 mg.      lisinopril (PRINIVIL,ZESTRIL) 40 MG tablet 40 mg.      metFORMIN (GLUCOPHAGE) 500 MG tablet Take 500 mg by mouth 2 (two) times daily.  3    omeprazole (PRILOSEC) 20 MG capsule Take 1 capsule (20 mg total) by mouth once daily. 30 capsule 11    ONETOUCH DELICA LANCETS 30 gauge Misc TEST BLOOD SUGAR ONCE DAILY  0    ONETOUCH ULTRA TEST Strp TEST BLOOD SUGAR ONCE DAILY  12    ONETOUCH ULTRA2 kit USE TO TEST BLOOD GLUCOSE DAILY  0    predniSONE (DELTASONE) 20 MG tablet Take 1.5 tablets  (30 mg total) by mouth once daily. Take 30 mg (1 and a half pills) daily. 45 tablet 5    pyridostigmine (MESTINON) 60 mg Tab Take 1 tablet (60 mg total) by mouth 4 (four) times daily. Take mestinon 3-4 times per day. 120 tablet 11    sertraline (ZOLOFT) 50 MG tablet TAKE 1/2 TABLET BY MOUTH AT BEDTIME 15 tablet 3     No current facility-administered medications for this visit.        No past medical history on file.  No past surgical history on file.  No family history on file.  Social History   Substance Use Topics    Smoking status: Current Some Day Smoker     Types: Cigars    Smokeless tobacco: Never Used    Alcohol use Not on file        Review of Systems:  Review of Systems   Constitutional: Positive for fatigue. Negative for activity change, appetite change and fever.   HENT: Negative for congestion.    Eyes: Negative for pain.        Occasional blurry vision and ptosis with MG flare   Respiratory: Positive for shortness of breath.    Cardiovascular: Negative for chest pain and palpitations.   Gastrointestinal: Negative for abdominal pain, nausea and vomiting.        Occasional dysphagia on liquids   Genitourinary: Negative for difficulty urinating.   Musculoskeletal: Positive for arthralgias (muscle fatigue and weakness, nightly).   Skin: Negative for color change and rash.   Neurological: Positive for speech difficulty. Negative for dizziness.   Psychiatric/Behavioral: Negative for agitation. The patient is not nervous/anxious.        OBJECTIVE:     Vital Signs (Most Recent)  Pulse: 70 (06/06/18 1049)  BP: (!) 162/92 (06/06/18 1049)  SpO2: 95 % (06/06/18 1049)  6' (1.829 m)  104.8 kg (231 lb 0.7 oz)     Physical Exam:  Physical Exam   Constitutional: He is oriented to person, place, and time. He appears well-developed and well-nourished.   HENT:   Head: Normocephalic and atraumatic.   Eyes: EOM are normal.   Neck: Normal range of motion. Neck supple. No tracheal deviation present.   Cardiovascular:  Normal rate and regular rhythm.    Pulmonary/Chest: Effort normal and breath sounds normal.   Abdominal: Soft.   Musculoskeletal: Normal range of motion.   Lymphadenopathy:     He has no cervical adenopathy.   Neurological: He is alert and oriented to person, place, and time.   Skin: Skin is warm and dry.   Psychiatric: He has a normal mood and affect. Thought content normal.   Vitals reviewed.      Laboratory  Chest CT May 2017:   Normal examination of the thorax. No evidence of thymoma or other thymic mass.   Colonic diverticulosis.     ASSESSMENT/PLAN:     Patient is a 67 y.o. male presents with HTN, DM, HLD, MG (Ach-R ab +) with MG in March 2017 here today for evaluation of thymectomy. Current regimen is Prednisone 30mg, Mestinon 60mg BID-TID, and IVIG [id number].    PLAN:Plan     Will need PFTS for single lung ventilation.   Will request cardiology records from cardiologist in [place]. - DSE/echo. Will repeat if out of date. Dr. Rob Recio.   Pending above, will proceed with right robotic assisted thymectomy, possible median sternotomy. Will have the patient receive IVIG 1-2 weeks prior to surgery depending on symptoms.  Appropriate patient education regarding the anastacia-operative period as well as intraoperative details were discussed. Risks, including but not limited to, bleeding, infection, pain and anesthetic complication were discussed. Patient was given the opportunity to ask questions and to have those questions answered to their satisfaction. Patient verbalized understanding to both procedure and associated risks. Consent was obtained.  Will need to sign blood consents day of surgery.      Distress Screening Results: Psychosocial Distress screening score of Distress Score: 0 noted and reviewed. No intervention indicated.     ATTENDING ATTESTATION:    I evaluated the patient and I agree with the assessment and plan.  I recommend a robotic thymectomy possible median sternotomy. I will obtain PFTs and a  stress echocardiogram.  Mr. Candelario should receive IVIG 1-2 weeks prior to surgery. I informed Mr. Candelario that the myasthenia symptoms may not resolve for upwards of one year.  I have discussed the technical aspects, risks and benefits of the procedure with him.  I informed him that the risks are the most common risks and that there are other less likely risks that are too numerous to elaborate.  He is aware and has agreed to undergo the procedure as detailed on the consent form.

## 2018-06-13 ENCOUNTER — DOCUMENTATION ONLY (OUTPATIENT)
Dept: CARDIOTHORACIC SURGERY | Facility: CLINIC | Age: 68
End: 2018-06-13

## 2018-06-13 DIAGNOSIS — Z01.818 PREOP TESTING: Primary | ICD-10-CM

## 2018-06-21 ENCOUNTER — ANESTHESIA EVENT (OUTPATIENT)
Dept: SURGERY | Facility: HOSPITAL | Age: 68
DRG: 042 | End: 2018-06-21
Payer: MEDICARE

## 2018-06-21 RX ORDER — ASPIRIN 81 MG/1
81 TABLET ORAL DAILY
COMMUNITY
End: 2023-11-30

## 2018-06-21 NOTE — ANESTHESIA PREPROCEDURE EVALUATION
" Anesthesia Assessment: Preoperative EQUATION    Planned Procedure: Procedure(s) (LRB):  XI ROBOTIC THYMECTOMY (Right)  STERNOTOMY median (N/A)  Requested Anesthesia Type:General  Surgeon: Ricky Hernandez MD  Service: Thoracic  Known or anticipated Date of Surgery:7/16/2018    Surgeon notes: reviewed and Myasthenia gravis  Previous anesthesia records:Not available and Patient past surgeical history: s/p Appendectomy/ s/p Hernia Repair-both surgeries done at [facility]-No anesthesia records found in [employer] [facility]-Patient stated he has done well with anesthesia, for both previous surgeries.    Last PCP note: 3-6 months ago , within [facility] , focused visit   Subspecialty notes: Neurology  Tests already available:  No recent tests.      Plan:    Testing:  Hematology Profile, BMP, T&S and EKG      Patient  has previously scheduled Medical Appointment:None    Navigation: Tests Scheduled. Labs-Hem Prof/BMP/T&S/EKG on 7/6/18 @ 12:15p & 12:40p             Consults scheduled.POC on 7/6/18 @ 1p & [facility] PCP -request for "Clearance"  Faxed message by [employer] & will fax labs & ekg to PCP once results are available after appts. on 7/6/18-PENDING final "Clearance".             Results will be tracked by Preop Clinic.                 Tea Peralta RN  6/21/18 06/21/2018    Pre-operative evaluation for XI ROBOTIC THYMECTOMY (Right), STERNOTOMY median (N/A)      Otoniel Candelario is a 67 y.o. male with a pmhx of CAD, GERD, HTN, DM2, HLD, TOB ABUSE, and MG controlled with mestinon, prednisone and IVIG. He is now presenting for procedure noted above.     History reviewed. No pertinent past medical history.    Patient Active Problem List   Diagnosis    Myasthenia gravis    SOB (shortness of breath)    Long-term current use of intravenous immunoglobulin (IVIG)     Past Surgical History:   Procedure Laterality Date "    APPENDECTOMY      HERNIA REPAIR       Vital Signs Range (Last 24H):         CBC:     Recent Labs  Lab 18  1250   WBC 15.62*   RBC 5.05   HGB 15.1   HCT 46.0      MCV 91   MCH 29.9   MCHC 32.8       CMP:   Recent Labs  Lab 18  1250      K 4.9   CL 98   CO2 25   BUN 18   CREATININE 1.2   *   CALCIUM 9.8       INR:  No results for input(s): PT, INR, PROTIME, APTT in the last 720 hours.      Diagnostic Studies:      EKD Echo:    Anesthesia Evaluation      I have reviewed the Medications.   Steroids Taken In Past Year: Prednisone    Review of Systems  Anesthesia Hx:  No problems with previous Anesthesia History of prior surgery of interest to airway management or planning: Previous anesthesia: General colonoscopy  with general anesthesia.  Denies Family Hx of Anesthesia complications.   Denies Personal Hx of Anesthesia complications.   Social:  Former Smoker Smokes 1 small cigar daily  Beer 6 per week   Hematology/Oncology:  Hematology Normal   Oncology Normal     EENT/Dental:   Glasses   Reports difficulty swallowing liquids   Cardiovascular:   Hypertension Denies MI. CAD    hyperlipidemia  Functional Capacity good / => 4 METS, cutting trees last month, cut grass; climb 1 FOS; denies CP, reports SOB with fatigue which iimproves with rest    Pulmonary:   Denies Asthma. Shortness of breath (mostly with fatigue)  Denies Sleep Apnea.    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 Denies Seizures. Myasthenia Gravis (dx 3/2017) Denies Pain    Endocrine:   Diabetes (A1C 11.3 outside lab report dated 6/15/18 to be scanned to media), poorly controlled, type 2    Psych:  Phobia and Claustrophobia (prefers not to have closed mask on face).         Physical Exam  General:  Well nourished    Airway/Jaw/Neck:  Airway Findings: Mouth Opening: Normal Tongue: Normal   General Airway Assessment: Adult  Mallampati: I  TM Distance: Normal, at least 6 cm  Jaw/Neck Findings:     Neck ROM: Normal ROM      Dental:  Dental Findings: Upper Dentures, Lower Dentures, Edentulous   Chest/Lungs:  Chest/Lungs Findings: Clear to auscultation, Normal Respiratory Rate     Heart/Vascular:  Heart Findings: Rate: Normal  Rhythm: Regular Rhythm  Sounds: Normal        Mental Status:  Mental Status Findings:  Cooperative, Alert and Oriented       Pt was seen in POC 7/6/18; findings discussed with Dr Escobedo; pt brings in outside EKG and lab report with A1C result (to scan to media); pt states that he will have IVIG infusions on 7/9-7/11/18 at home with home health nurse; pending outside cardiology clearance as PCP had referred pt to cardiologist for clearance; in basket message sent to Dr Hernandez's staff to inform him of 11.3 A1C result/Giulia Laura RN    Addendum 7/13/18 1345: Phone contact with pt 7/13/18: pt reports daily AM glucose checks have improved (132-177) since starting Tradjenta after his last PCP appointment (Dr Elliott) on 6/22/18. Pt scheduled for BMP, A1C at [facility] today; the results to be faxed to POC; cleared by outside cardiologist, Dr Rob Recio (in media)/Giulia Laura RN    Addendum 7/13/18 1730: still pending results from outside lab after multiple requests with contact information ([facility] ph [phone]; fax [phone]/Giulia Laura RN        Anesthesia Plan  Type of Anesthesia, risks & benefits discussed:  Anesthesia Type:  general  Patient's Preference:   Intra-op Monitoring Plan: arterial line and standard ASA monitors  Intra-op Monitoring Plan Comments:   Post Op Pain Control Plan: multimodal analgesia, IV/PO Opioids PRN and per primary service following discharge from PACU  Post Op Pain Control Plan Comments:   Induction:   IV  Beta Blocker:  Patient is not currently on a Beta-Blocker (No further documentation required).        Informed Consent: Patient understands risks and agrees with Anesthesia plan.  Questions answered. Anesthesia consent signed with patient.  ASA Score: 3     Day of Surgery Review of History & Physical:    H&P update referred to the surgeon.         Ready For Surgery From Anesthesia Perspective.

## 2018-06-21 NOTE — PRE ADMISSION SCREENING
"Anesthesia Assessment: Preoperative EQUATION    Planned Procedure: Procedure(s) (LRB):  XI ROBOTIC THYMECTOMY (Right)  STERNOTOMY median (N/A)  Requested Anesthesia Type:General  Surgeon: Ricky Hernandez MD  Service: Thoracic  Known or anticipated Date of Surgery:7/16/2018    Surgeon notes: reviewed and Myasthenia gravis  Previous anesthesia records:Not available and Patient past surgeical history: s/p Appendectomy/ s/p Hernia Repair-both surgeries done at [facility] found in [facility]    Last PCP note: 3-6 months ago , outside [facility] , focused visit   Subspecialty notes: Neurology  Tests already available:  No recent tests.      Plan:    Testing:  Hematology Profile, BMP, T&S and EKG      Patient  has previously scheduled Medical Appointment:None    Navigation: Tests Scheduled. Labs-Hem Prof/BMP/T&S/EKG on 7/6/18 @ 12:15p & 12:40p             Consults scheduled.POC on 7/6/18 @ 1p & [facility] PCP -request for "Clearance"  Sent in-basket message by [employer] & will fax labs & ekg to PCP once results are available after appts. on 7/6/18-PENDING             Results will be tracked by Preop Clinic.                 Tea Peralta RN  6/21/18  "

## 2018-07-03 ENCOUNTER — TELEPHONE (OUTPATIENT)
Dept: CARDIOTHORACIC SURGERY | Facility: CLINIC | Age: 68
End: 2018-07-03

## 2018-07-03 NOTE — TELEPHONE ENCOUNTER
Spoke with wife who requested a phone call be made to pt again  In reference to what information is needed before heading to [facility] for testing.  Unable to leave message for pt at [phone].  ----- Message from Sri Nguyen sent at 7/3/2018 12:27 PM CDT -----  Contact: Pt  Needs Advice    Reason for call: Pt calling in regards to his EGD appt., Pt. Stated he's  having a EGD done at a different facility and wants to know if,Those result can be Faxed over     Communication Preference:[phone]  Additional Information:

## 2018-07-06 ENCOUNTER — HOSPITAL ENCOUNTER (OUTPATIENT)
Dept: PREADMISSION TESTING | Facility: HOSPITAL | Age: 68
Discharge: HOME | End: 2018-07-06
Attending: ANESTHESIOLOGY
Payer: OTHER GOVERNMENT

## 2018-07-06 VITALS
DIASTOLIC BLOOD PRESSURE: 81 MMHG | SYSTOLIC BLOOD PRESSURE: 140 MMHG | HEIGHT: 71 IN | HEART RATE: 90 BPM | TEMPERATURE: 98 F | BODY MASS INDEX: 31.92 KG/M2 | WEIGHT: 228 LBS | RESPIRATION RATE: 18 BRPM | OXYGEN SATURATION: 96 %

## 2018-07-06 RX ORDER — METFORMIN HYDROCHLORIDE 1000 MG/1
1000 TABLET ORAL 2 TIMES DAILY WITH MEALS
COMMUNITY
End: 2022-03-31 | Stop reason: CLARIF

## 2018-07-06 NOTE — DISCHARGE INSTRUCTIONS
Your surgery has been scheduled for:__________________________________________    You should report to:  ____[facility], located on the [place] side of the first floor of the           [facility] ([phone])  ____[facility], located on the [place] side of the            Second floor of the [facility] ([phone])  ____3rd Floor [facility] located on the [place] side of the [facility] [phone]  Please Note   - Tell your doctor if you take Aspirin, products containing Aspirin, herbal medications  or blood thinners, such as Coumadin, Ticlid, or Plavix.  (Consult your provider regarding holding or stopping before surgery).  - Arrange for someone to drive you home following surgery.  You will not be allowed to leave the surgical facility alone or drive yourself home following sedation and anesthesia.  Before Surgery  - Stop taking all herbal medications 14days prior to surgery  - No Motrin/Advil (Ibuprofen) 7 days before surgery  - No Aleve (Naproxen) 7 days before surgery  - No Goody's/BC powder 7 days before surgery  - Stop Taking Asprin, products containing Asprin _____days before surgery  - Stop taking blood thinners_______days before surgery  - Refrain from drinking alcoholic beverages for 24hours before and after surgery  - Stop or limit smoking _________days before surgery  - You may take Tylenol for pain  Night before Surgery  - DO NOT EAT OR DRINK ANYTHING AFTER MIDNIGHT, INCLUDING GUM, HARD CANDY, MINTS, OR CHEWING TOBACCO.  - Take a shower or bath (shower is recommended).  Bathe with Hibiclens soap or an antibacterial soap from the neck down.  If not supplied by your surgeon, hibiclens soap will need to be purchased over the counter in pharmacy.  Rinse soap off thoroughly.  - Shampoo your hair with your regular shampoo  The Day of Surgery  - Take another bath or shower with hibiclens or any  antibacterial soap, to reduce the chance of infection.  - Take heart and blood pressure medications with a small sip of water, as advised by the perioperative team.  - Do not take fluid pills  - You may brush your teeth and rinse your mouth, but do not swall any additional water.   - Do not apply perfumes, powder, body lotions or deodorant on the day of surgery.  - Nail polish should be removed.  - Do not wear makeup or moisturizer  - Wear comfortable clothes, such as a button front shirt and loose fitting pants.  - Leave all jewelry, including body piercings, and valuables at home.    - Bring any devices you will neeed after surgery such as crutches or canes.  - If you have sleep apnea, please bring your CPAP machine  In the event that your physical condition changes including the onset of a cold or respiratory illness, or if you have to delay or cancel your surgery, please notify your surgeon.Anesthesia: General Anesthesia  Youre due to have surgery. During surgery, youll be given medication called anesthesia. (It is also called anesthetic.) This will keep you comfortable and pain-free. Your anesthesia provider will use general anesthesia. This sheet tells you more about it.  What is general anesthesia?     You are watched continuously during your procedure by the anesthesia provider   General anesthesia puts you into a state like deep sleep. It goes into the bloodstream (IV anesthetics), into the lungs (gas anesthetics), or both. You feel nothing during the procedure. You will not remember it. During the procedure, the anesthesia provider monitors you continuously. He or she checks your heart rate and rhythm, blood pressure, breathing, and blood oxygen.  · IV Anesthetics. IV anesthetics are given through an IV line in your arm. Theyre often given first. This is so you are asleep before a gas anesthetic is started. Some kinds of IV anesthetics relieve pain. Others relax you. Your doctor will decide which kind  is best in your case.  · Gas Anesthetics. Gas anesthetics are breathed into the lungs. They are often used to keep you asleep. They can be given through a facemask or a tube placed in your larynx or trachea (breathing tube).  ? If you have a facemask, your anesthesia provider will most likely place it over your nose and mouth while youre still awake. Youll breathe oxygen through the mask as your IV anesthetic is started. Gas anesthetic may be added through the mask.  ? If you have a tube in the larynx or trachea, it will be inserted into your throat after youre asleep.  Anesthesia tools and medications  You will likely have:  · IV anesthetics. These are put into an IV line into your bloodstream.  · Gas anesthetics. You breathe these anesthetics into your lungs, where they pass into your bloodstream.  · Pulse oximeter. This is a small clip that is attached to the end of your finger. This measures your blood oxygen level.  · Electrocardiography leads (electrodes). These are small sticky pads that are placed on your chest. They record your heart rate and rhythm.  · Blood pressure cuff. This reads your blood pressure.  Risks and possible complications  General anesthesia has some risks. These include:  · Breathing problems  · Nausea and vomiting  · Sore throat or hoarseness (usually temporary)  · Allergic reaction to the anesthetic  · Irregular heartbeat (rare)  · Cardiac arrest (rare)   Anesthesia safety  · Follow all instructions you are given for how long not to eat or drink before your procedure.  · Be sure your doctor knows what medications and drugs you take. This includes over-the-counter medications, herbs, supplements, alcohol or other drugs. You will be asked when those were last taken.  · Have an adult family member or friend drive you home after the procedure.  · For the first 24 hours after your surgery:  ? Do not drive or use heavy equipment.  ? Have a trusted family member or spouse make important  decisions or sign documents.  ? Avoid alcohol.  ? Have a responsible adult stay with you. He or she can watch for problems and help keep you safe.  Date Last Reviewed: 10/16/2014  © 4021-1096 [employer]. [address] [place] [address], [place] [address]. All rights reserved. This information is not intended as a substitute for professional medical care. Always follow your healthcare professional's instructions.

## 2018-07-13 ENCOUNTER — TELEPHONE (OUTPATIENT)
Dept: CARDIOTHORACIC SURGERY | Facility: CLINIC | Age: 68
End: 2018-07-13

## 2018-07-13 DIAGNOSIS — Z01.818 PREOP TESTING: Primary | ICD-10-CM

## 2018-07-13 DIAGNOSIS — E08.69 DIABETES MELLITUS DUE TO UNDERLYING CONDITION WITH OTHER SPECIFIED COMPLICATION: ICD-10-CM

## 2018-07-13 NOTE — PRE-PROCEDURE INSTRUCTIONS
Phone contact with pt 7/13/18: pt reports daily AM glucose checks have improved (132-177) since starting Tradjenta after his last PCP appointment (Dr Elliott) on 6/22/18. Pt scheduled for BMP, A1C at [facility] today; the results to be faxed to POC.

## 2018-07-16 ENCOUNTER — ANESTHESIA (OUTPATIENT)
Dept: SURGERY | Facility: HOSPITAL | Age: 68
DRG: 042 | End: 2018-07-16
Payer: MEDICARE

## 2018-07-16 ENCOUNTER — HOSPITAL ENCOUNTER (INPATIENT)
Facility: HOSPITAL | Age: 68
LOS: 3 days | Discharge: HOME | DRG: 042 | End: 2018-07-19
Attending: THORACIC SURGERY (CARDIOTHORACIC VASCULAR SURGERY) | Admitting: THORACIC SURGERY (CARDIOTHORACIC VASCULAR SURGERY)
Payer: OTHER GOVERNMENT

## 2018-07-16 DIAGNOSIS — Z09 POSTOP CHECK: ICD-10-CM

## 2018-07-16 DIAGNOSIS — G70.00 MYASTHENIA GRAVIS: Primary | ICD-10-CM

## 2018-07-16 PROBLEM — Z79.52 CURRENT CHRONIC USE OF SYSTEMIC STEROIDS: Status: ACTIVE | Noted: 2018-07-16

## 2018-07-16 PROBLEM — E11.9 TYPE 2 DIABETES MELLITUS: Status: ACTIVE | Noted: 2018-07-16

## 2018-07-16 LAB
ALLENS TEST: ABNORMAL
ANION GAP SERPL CALC-SCNC: 7 MMOL/L
BASOPHILS # BLD AUTO: 0.01 K/UL
BASOPHILS NFR BLD: 0.1 %
BUN SERPL-MCNC: 11 MG/DL
CALCIUM SERPL-MCNC: 8.1 MG/DL
CHLORIDE SERPL-SCNC: 102 MMOL/L
CO2 SERPL-SCNC: 27 MMOL/L
CREAT SERPL-MCNC: 1 MG/DL
DELSYS: ABNORMAL
DIFFERENTIAL METHOD: ABNORMAL
EOSINOPHIL # BLD AUTO: 0 K/UL
EOSINOPHIL NFR BLD: 0 %
ERYTHROCYTE [DISTWIDTH] IN BLOOD BY AUTOMATED COUNT: 13.7 %
ERYTHROCYTE [SEDIMENTATION RATE] IN BLOOD BY WESTERGREN METHOD: 18 MM/H
EST. GFR  (AFRICAN AMERICAN): >60 ML/MIN/1.73 M^2
EST. GFR  (NON AFRICAN AMERICAN): >60 ML/MIN/1.73 M^2
FLOW: 6
GLUCOSE SERPL-MCNC: 274 MG/DL
GLUCOSE SERPL-MCNC: 276 MG/DL (ref 70–110)
GLUCOSE SERPL-MCNC: 315 MG/DL (ref 70–110)
GLUCOSE SERPL-MCNC: 339 MG/DL (ref 70–110)
HCO3 UR-SCNC: 27.7 MMOL/L (ref 24–28)
HCO3 UR-SCNC: 28.9 MMOL/L (ref 24–28)
HCO3 UR-SCNC: 30 MMOL/L (ref 24–28)
HCO3 UR-SCNC: 31.2 MMOL/L (ref 24–28)
HCT VFR BLD AUTO: 34.9 %
HCT VFR BLD CALC: 34 %PCV (ref 36–54)
HGB BLD-MCNC: 12 G/DL
IMM GRANULOCYTES # BLD AUTO: 0.05 K/UL
IMM GRANULOCYTES NFR BLD AUTO: 0.4 %
LYMPHOCYTES # BLD AUTO: 0.6 K/UL
LYMPHOCYTES NFR BLD: 4.5 %
MAGNESIUM SERPL-MCNC: 1.9 MG/DL
MCH RBC QN AUTO: 30.5 PG
MCHC RBC AUTO-ENTMCNC: 34.4 G/DL
MCV RBC AUTO: 89 FL
MODE: ABNORMAL
MONOCYTES # BLD AUTO: 0.4 K/UL
MONOCYTES NFR BLD: 3.5 %
NEUTROPHILS # BLD AUTO: 11.3 K/UL
NEUTROPHILS NFR BLD: 91.5 %
NRBC BLD-RTO: 0 /100 WBC
PCO2 BLDA: 46 MMHG (ref 35–45)
PCO2 BLDA: 53.6 MMHG (ref 35–45)
PCO2 BLDA: 53.6 MMHG (ref 35–45)
PCO2 BLDA: 55.3 MMHG (ref 35–45)
PH SMN: 7.32 [PH] (ref 7.35–7.45)
PH SMN: 7.33 [PH] (ref 7.35–7.45)
PH SMN: 7.37 [PH] (ref 7.35–7.45)
PH SMN: 7.42 [PH] (ref 7.35–7.45)
PHOSPHATE SERPL-MCNC: 3 MG/DL
PLATELET # BLD AUTO: 218 K/UL
PMV BLD AUTO: 10.3 FL
PO2 BLDA: 368 MMHG (ref 80–100)
PO2 BLDA: 81 MMHG (ref 80–100)
PO2 BLDA: 88 MMHG (ref 80–100)
PO2 BLDA: 92 MMHG (ref 80–100)
POC BE: 2 MMOL/L
POC BE: 3 MMOL/L
POC BE: 6 MMOL/L
POC BE: 6 MMOL/L
POC IONIZED CALCIUM: 1.1 MMOL/L (ref 1.06–1.42)
POC IONIZED CALCIUM: 1.12 MMOL/L (ref 1.06–1.42)
POC IONIZED CALCIUM: 1.15 MMOL/L (ref 1.06–1.42)
POC SATURATED O2: 100 % (ref 95–100)
POC SATURATED O2: 95 % (ref 95–100)
POC SATURATED O2: 96 % (ref 95–100)
POC SATURATED O2: 97 % (ref 95–100)
POC TCO2: 29 MMOL/L (ref 23–27)
POC TCO2: 31 MMOL/L (ref 23–27)
POC TCO2: 31 MMOL/L (ref 23–27)
POC TCO2: 33 MMOL/L (ref 23–27)
POCT GLUCOSE: 119 MG/DL (ref 70–110)
POCT GLUCOSE: 124 MG/DL (ref 70–110)
POCT GLUCOSE: 136 MG/DL (ref 70–110)
POCT GLUCOSE: 141 MG/DL (ref 70–110)
POCT GLUCOSE: 142 MG/DL (ref 70–110)
POCT GLUCOSE: 143 MG/DL (ref 70–110)
POCT GLUCOSE: 146 MG/DL (ref 70–110)
POCT GLUCOSE: 151 MG/DL (ref 70–110)
POCT GLUCOSE: 174 MG/DL (ref 70–110)
POCT GLUCOSE: 212 MG/DL (ref 70–110)
POCT GLUCOSE: 270 MG/DL (ref 70–110)
POCT GLUCOSE: 275 MG/DL (ref 70–110)
POTASSIUM BLD-SCNC: 4 MMOL/L (ref 3.5–5.1)
POTASSIUM BLD-SCNC: 4 MMOL/L (ref 3.5–5.1)
POTASSIUM BLD-SCNC: 4.2 MMOL/L (ref 3.5–5.1)
POTASSIUM SERPL-SCNC: 4.1 MMOL/L
RBC # BLD AUTO: 3.94 M/UL
SAMPLE: ABNORMAL
SITE: ABNORMAL
SODIUM BLD-SCNC: 137 MMOL/L (ref 136–145)
SODIUM BLD-SCNC: 137 MMOL/L (ref 136–145)
SODIUM BLD-SCNC: 138 MMOL/L (ref 136–145)
SODIUM SERPL-SCNC: 136 MMOL/L
SP02: 96
WBC # BLD AUTO: 12.34 K/UL

## 2018-07-16 PROCEDURE — 25000003 PHARM REV CODE 250: Performed by: PHYSICIAN ASSISTANT

## 2018-07-16 PROCEDURE — 82962 GLUCOSE BLOOD TEST: CPT | Performed by: THORACIC SURGERY (CARDIOTHORACIC VASCULAR SURGERY)

## 2018-07-16 PROCEDURE — 63600175 PHARM REV CODE 636 W HCPCS: Performed by: SURGERY

## 2018-07-16 PROCEDURE — 99223 1ST HOSP IP/OBS HIGH 75: CPT | Mod: ,,, | Performed by: INTERNAL MEDICINE

## 2018-07-16 PROCEDURE — 94799 UNLISTED PULMONARY SVC/PX: CPT

## 2018-07-16 PROCEDURE — 27201037 HC PRESSURE MONITORING SET UP

## 2018-07-16 PROCEDURE — 25000003 PHARM REV CODE 250: Performed by: ANESTHESIOLOGY

## 2018-07-16 PROCEDURE — 25000003 PHARM REV CODE 250: Performed by: STUDENT IN AN ORGANIZED HEALTH CARE EDUCATION/TRAINING PROGRAM

## 2018-07-16 PROCEDURE — 93010 ELECTROCARDIOGRAM REPORT: CPT | Mod: ,,, | Performed by: INTERNAL MEDICINE

## 2018-07-16 PROCEDURE — 37000008 HC ANESTHESIA 1ST 15 MINUTES: Performed by: THORACIC SURGERY (CARDIOTHORACIC VASCULAR SURGERY)

## 2018-07-16 PROCEDURE — 82803 BLOOD GASES ANY COMBINATION: CPT

## 2018-07-16 PROCEDURE — 20600001 HC STEP DOWN PRIVATE ROOM

## 2018-07-16 PROCEDURE — 37000009 HC ANESTHESIA EA ADD 15 MINS: Performed by: THORACIC SURGERY (CARDIOTHORACIC VASCULAR SURGERY)

## 2018-07-16 PROCEDURE — 63600175 PHARM REV CODE 636 W HCPCS: Performed by: THORACIC SURGERY (CARDIOTHORACIC VASCULAR SURGERY)

## 2018-07-16 PROCEDURE — 84100 ASSAY OF PHOSPHORUS: CPT

## 2018-07-16 PROCEDURE — 36000710: Performed by: THORACIC SURGERY (CARDIOTHORACIC VASCULAR SURGERY)

## 2018-07-16 PROCEDURE — 83735 ASSAY OF MAGNESIUM: CPT

## 2018-07-16 PROCEDURE — 27000221 HC OXYGEN, UP TO 24 HOURS

## 2018-07-16 PROCEDURE — 88307 TISSUE EXAM BY PATHOLOGIST: CPT | Mod: 26,,, | Performed by: PATHOLOGY

## 2018-07-16 PROCEDURE — 85025 COMPLETE CBC W/AUTO DIFF WBC: CPT

## 2018-07-16 PROCEDURE — C9290 INJ, BUPIVACAINE LIPOSOME: HCPCS | Performed by: THORACIC SURGERY (CARDIOTHORACIC VASCULAR SURGERY)

## 2018-07-16 PROCEDURE — 25000003 PHARM REV CODE 250: Performed by: SURGERY

## 2018-07-16 PROCEDURE — 71000033 HC RECOVERY, INTIAL HOUR: Performed by: THORACIC SURGERY (CARDIOTHORACIC VASCULAR SURGERY)

## 2018-07-16 PROCEDURE — 88342 IMHCHEM/IMCYTCHM 1ST ANTB: CPT | Performed by: PATHOLOGY

## 2018-07-16 PROCEDURE — 71000039 HC RECOVERY, EACH ADD'L HOUR: Performed by: THORACIC SURGERY (CARDIOTHORACIC VASCULAR SURGERY)

## 2018-07-16 PROCEDURE — 94761 N-INVAS EAR/PLS OXIMETRY MLT: CPT

## 2018-07-16 PROCEDURE — 88307 TISSUE EXAM BY PATHOLOGIST: CPT | Performed by: PATHOLOGY

## 2018-07-16 PROCEDURE — 36415 COLL VENOUS BLD VENIPUNCTURE: CPT

## 2018-07-16 PROCEDURE — 27201423 OPTIME MED/SURG SUP & DEVICES STERILE SUPPLY: Performed by: THORACIC SURGERY (CARDIOTHORACIC VASCULAR SURGERY)

## 2018-07-16 PROCEDURE — 63600175 PHARM REV CODE 636 W HCPCS: Performed by: ANESTHESIOLOGY

## 2018-07-16 PROCEDURE — 60521 REMOVAL OF THYMUS GLAND: CPT | Mod: ,,, | Performed by: THORACIC SURGERY (CARDIOTHORACIC VASCULAR SURGERY)

## 2018-07-16 PROCEDURE — 80048 BASIC METABOLIC PNL TOTAL CA: CPT

## 2018-07-16 PROCEDURE — 93005 ELECTROCARDIOGRAM TRACING: CPT

## 2018-07-16 PROCEDURE — 36000711: Performed by: THORACIC SURGERY (CARDIOTHORACIC VASCULAR SURGERY)

## 2018-07-16 PROCEDURE — 86920 COMPATIBILITY TEST SPIN: CPT

## 2018-07-16 PROCEDURE — 37799 UNLISTED PX VASCULAR SURGERY: CPT

## 2018-07-16 PROCEDURE — 07TM0ZZ RESECTION OF THYMUS, OPEN APPROACH: ICD-10-PCS | Performed by: THORACIC SURGERY (CARDIOTHORACIC VASCULAR SURGERY)

## 2018-07-16 PROCEDURE — 88342 IMHCHEM/IMCYTCHM 1ST ANTB: CPT | Mod: 26,,, | Performed by: PATHOLOGY

## 2018-07-16 PROCEDURE — D9220A PRA ANESTHESIA: Mod: ,,, | Performed by: ANESTHESIOLOGY

## 2018-07-16 PROCEDURE — 63600175 PHARM REV CODE 636 W HCPCS: Performed by: PHYSICIAN ASSISTANT

## 2018-07-16 PROCEDURE — 63600175 PHARM REV CODE 636 W HCPCS: Performed by: STUDENT IN AN ORGANIZED HEALTH CARE EDUCATION/TRAINING PROGRAM

## 2018-07-16 PROCEDURE — 25000003 PHARM REV CODE 250: Performed by: THORACIC SURGERY (CARDIOTHORACIC VASCULAR SURGERY)

## 2018-07-16 RX ORDER — PROMETHAZINE HYDROCHLORIDE 25 MG/1
25 SUPPOSITORY RECTAL EVERY 6 HOURS PRN
Status: DISCONTINUED | OUTPATIENT
Start: 2018-07-16 | End: 2018-07-19 | Stop reason: HOSPADM

## 2018-07-16 RX ORDER — ACETAMINOPHEN 10 MG/ML
INJECTION, SOLUTION INTRAVENOUS
Status: DISCONTINUED | OUTPATIENT
Start: 2018-07-16 | End: 2018-07-16

## 2018-07-16 RX ORDER — HYDROMORPHONE HYDROCHLORIDE 1 MG/ML
0.2 INJECTION, SOLUTION INTRAMUSCULAR; INTRAVENOUS; SUBCUTANEOUS EVERY 5 MIN PRN
Status: DISCONTINUED | OUTPATIENT
Start: 2018-07-16 | End: 2018-07-16 | Stop reason: HOSPADM

## 2018-07-16 RX ORDER — CEFAZOLIN SODIUM 1 G/3ML
2 INJECTION, POWDER, FOR SOLUTION INTRAMUSCULAR; INTRAVENOUS
Status: COMPLETED | OUTPATIENT
Start: 2018-07-16 | End: 2018-07-16

## 2018-07-16 RX ORDER — PANTOPRAZOLE SODIUM 40 MG/1
40 TABLET, DELAYED RELEASE ORAL DAILY
Status: DISCONTINUED | OUTPATIENT
Start: 2018-07-17 | End: 2018-07-19 | Stop reason: HOSPADM

## 2018-07-16 RX ORDER — SODIUM CHLORIDE 9 MG/ML
20 INJECTION, SOLUTION INTRAVENOUS CONTINUOUS
Status: DISCONTINUED | OUTPATIENT
Start: 2018-07-16 | End: 2018-07-17

## 2018-07-16 RX ORDER — PYRIDOSTIGMINE BROMIDE 60 MG/1
60 TABLET ORAL 4 TIMES DAILY
Status: DISCONTINUED | OUTPATIENT
Start: 2018-07-16 | End: 2018-07-16

## 2018-07-16 RX ORDER — KETAMINE HYDROCHLORIDE 100 MG/ML
INJECTION, SOLUTION INTRAMUSCULAR; INTRAVENOUS
Status: DISCONTINUED | OUTPATIENT
Start: 2018-07-16 | End: 2018-07-16

## 2018-07-16 RX ORDER — MORPHINE SULFATE 1 MG/ML
INJECTION INTRAVENOUS CONTINUOUS
Status: DISCONTINUED | OUTPATIENT
Start: 2018-07-16 | End: 2018-07-16

## 2018-07-16 RX ORDER — PROPOFOL 10 MG/ML
INJECTION, EMULSION INTRAVENOUS
Status: DISCONTINUED | OUTPATIENT
Start: 2018-07-16 | End: 2018-07-16

## 2018-07-16 RX ORDER — LIDOCAINE HYDROCHLORIDE 10 MG/ML
1 INJECTION, SOLUTION EPIDURAL; INFILTRATION; INTRACAUDAL; PERINEURAL ONCE
Status: COMPLETED | OUTPATIENT
Start: 2018-07-16 | End: 2018-07-16

## 2018-07-16 RX ORDER — ACETAMINOPHEN 500 MG
1000 TABLET ORAL EVERY 8 HOURS
Status: DISCONTINUED | OUTPATIENT
Start: 2018-07-16 | End: 2018-07-17

## 2018-07-16 RX ORDER — ASPIRIN 81 MG/1
81 TABLET ORAL DAILY
Status: DISCONTINUED | OUTPATIENT
Start: 2018-07-17 | End: 2018-07-19 | Stop reason: HOSPADM

## 2018-07-16 RX ORDER — ATORVASTATIN CALCIUM 20 MG/1
80 TABLET, FILM COATED ORAL NIGHTLY
Status: DISCONTINUED | OUTPATIENT
Start: 2018-07-16 | End: 2018-07-19 | Stop reason: HOSPADM

## 2018-07-16 RX ORDER — GLYCOPYRROLATE 0.2 MG/ML
INJECTION INTRAMUSCULAR; INTRAVENOUS
Status: DISCONTINUED | OUTPATIENT
Start: 2018-07-16 | End: 2018-07-16

## 2018-07-16 RX ORDER — SODIUM CHLORIDE 9 MG/ML
INJECTION, SOLUTION INTRAVENOUS CONTINUOUS
Status: DISCONTINUED | OUTPATIENT
Start: 2018-07-16 | End: 2018-07-16

## 2018-07-16 RX ORDER — PYRIDOSTIGMINE BROMIDE 60 MG/1
60 TABLET ORAL 4 TIMES DAILY
Status: DISCONTINUED | OUTPATIENT
Start: 2018-07-16 | End: 2018-07-19 | Stop reason: HOSPADM

## 2018-07-16 RX ORDER — ROCURONIUM BROMIDE 10 MG/ML
INJECTION, SOLUTION INTRAVENOUS
Status: DISCONTINUED | OUTPATIENT
Start: 2018-07-16 | End: 2018-07-16

## 2018-07-16 RX ORDER — MIDAZOLAM HYDROCHLORIDE 1 MG/ML
INJECTION, SOLUTION INTRAMUSCULAR; INTRAVENOUS
Status: DISCONTINUED | OUTPATIENT
Start: 2018-07-16 | End: 2018-07-16

## 2018-07-16 RX ORDER — EPHEDRINE SULFATE/0.9% NACL/PF 25 MG/5 ML
SYRINGE (ML) INTRAVENOUS
Status: DISCONTINUED | OUTPATIENT
Start: 2018-07-16 | End: 2018-07-16

## 2018-07-16 RX ORDER — HYDROMORPHONE HCL IN 0.9% NACL 6 MG/30 ML
PATIENT CONTROLLED ANALGESIA SYRINGE INTRAVENOUS CONTINUOUS
Status: DISCONTINUED | OUTPATIENT
Start: 2018-07-16 | End: 2018-07-17

## 2018-07-16 RX ORDER — ONDANSETRON 2 MG/ML
4 INJECTION INTRAMUSCULAR; INTRAVENOUS DAILY PRN
Status: DISCONTINUED | OUTPATIENT
Start: 2018-07-16 | End: 2018-07-16 | Stop reason: HOSPADM

## 2018-07-16 RX ORDER — AMOXICILLIN 250 MG
2 CAPSULE ORAL DAILY
Status: DISCONTINUED | OUTPATIENT
Start: 2018-07-17 | End: 2018-07-19 | Stop reason: HOSPADM

## 2018-07-16 RX ORDER — NALOXONE HCL 0.4 MG/ML
0.02 VIAL (ML) INJECTION
Status: DISCONTINUED | OUTPATIENT
Start: 2018-07-16 | End: 2018-07-19 | Stop reason: HOSPADM

## 2018-07-16 RX ORDER — POLYETHYLENE GLYCOL 3350 17 G/17G
17 POWDER, FOR SOLUTION ORAL DAILY
Status: DISCONTINUED | OUTPATIENT
Start: 2018-07-17 | End: 2018-07-19 | Stop reason: HOSPADM

## 2018-07-16 RX ORDER — DEXMEDETOMIDINE HYDROCHLORIDE 100 UG/ML
INJECTION, SOLUTION INTRAVENOUS
Status: DISCONTINUED | OUTPATIENT
Start: 2018-07-16 | End: 2018-07-16

## 2018-07-16 RX ORDER — LIDOCAINE HCL/PF 100 MG/5ML
SYRINGE (ML) INTRAVENOUS
Status: DISCONTINUED | OUTPATIENT
Start: 2018-07-16 | End: 2018-07-16

## 2018-07-16 RX ORDER — SODIUM CHLORIDE 0.9 % (FLUSH) 0.9 %
3 SYRINGE (ML) INJECTION
Status: DISCONTINUED | OUTPATIENT
Start: 2018-07-16 | End: 2018-07-16 | Stop reason: HOSPADM

## 2018-07-16 RX ORDER — HYDROMORPHONE HYDROCHLORIDE 1 MG/ML
0.2 INJECTION, SOLUTION INTRAMUSCULAR; INTRAVENOUS; SUBCUTANEOUS EVERY 5 MIN PRN
Status: DISCONTINUED | OUTPATIENT
Start: 2018-07-16 | End: 2018-07-16

## 2018-07-16 RX ORDER — CEFAZOLIN SODIUM 1 G/3ML
2 INJECTION, POWDER, FOR SOLUTION INTRAMUSCULAR; INTRAVENOUS
Status: COMPLETED | OUTPATIENT
Start: 2018-07-16 | End: 2018-07-17

## 2018-07-16 RX ORDER — FENTANYL CITRATE 50 UG/ML
INJECTION, SOLUTION INTRAMUSCULAR; INTRAVENOUS
Status: DISCONTINUED | OUTPATIENT
Start: 2018-07-16 | End: 2018-07-16

## 2018-07-16 RX ORDER — VANCOMYCIN HYDROCHLORIDE 1 G/20ML
INJECTION, POWDER, LYOPHILIZED, FOR SOLUTION INTRAVENOUS
Status: DISCONTINUED | OUTPATIENT
Start: 2018-07-16 | End: 2018-07-16 | Stop reason: HOSPADM

## 2018-07-16 RX ORDER — ENOXAPARIN SODIUM 100 MG/ML
40 INJECTION SUBCUTANEOUS EVERY 24 HOURS
Status: DISCONTINUED | OUTPATIENT
Start: 2018-07-17 | End: 2018-07-19 | Stop reason: HOSPADM

## 2018-07-16 RX ORDER — PHENYLEPHRINE HYDROCHLORIDE 10 MG/ML
INJECTION INTRAVENOUS
Status: DISCONTINUED | OUTPATIENT
Start: 2018-07-16 | End: 2018-07-16

## 2018-07-16 RX ORDER — ONDANSETRON 2 MG/ML
4 INJECTION INTRAMUSCULAR; INTRAVENOUS EVERY 8 HOURS PRN
Status: DISCONTINUED | OUTPATIENT
Start: 2018-07-16 | End: 2018-07-19 | Stop reason: HOSPADM

## 2018-07-16 RX ORDER — OXYCODONE HYDROCHLORIDE 5 MG/1
5 TABLET ORAL EVERY 6 HOURS PRN
Status: DISCONTINUED | OUTPATIENT
Start: 2018-07-16 | End: 2018-07-16

## 2018-07-16 RX ADMIN — PHENYLEPHRINE HYDROCHLORIDE 100 MCG: 10 INJECTION INTRAVENOUS at 07:07

## 2018-07-16 RX ADMIN — DEXMEDETOMIDINE HYDROCHLORIDE 4 MCG: 100 INJECTION, SOLUTION INTRAVENOUS at 11:07

## 2018-07-16 RX ADMIN — Medication 5 MG: at 08:07

## 2018-07-16 RX ADMIN — Medication 5 MG: at 10:07

## 2018-07-16 RX ADMIN — PYRIDOSTIGMINE BROMIDE 60 MG: 60 TABLET ORAL at 06:07

## 2018-07-16 RX ADMIN — PHENYLEPHRINE HYDROCHLORIDE 100 MCG: 10 INJECTION INTRAVENOUS at 09:07

## 2018-07-16 RX ADMIN — ROCURONIUM BROMIDE 50 MG: 10 INJECTION, SOLUTION INTRAVENOUS at 07:07

## 2018-07-16 RX ADMIN — CEFAZOLIN 2 G: 330 INJECTION, POWDER, FOR SOLUTION INTRAMUSCULAR; INTRAVENOUS at 08:07

## 2018-07-16 RX ADMIN — SODIUM CHLORIDE 1.5 UNITS/HR: 9 INJECTION, SOLUTION INTRAVENOUS at 05:07

## 2018-07-16 RX ADMIN — CEFAZOLIN 2 G: 1 INJECTION, POWDER, FOR SOLUTION INTRAMUSCULAR; INTRAVENOUS at 04:07

## 2018-07-16 RX ADMIN — PROPOFOL 20 MG: 10 INJECTION, EMULSION INTRAVENOUS at 09:07

## 2018-07-16 RX ADMIN — PROPOFOL 30 MG: 10 INJECTION, EMULSION INTRAVENOUS at 09:07

## 2018-07-16 RX ADMIN — FENTANYL CITRATE 50 MCG: 50 INJECTION INTRAMUSCULAR; INTRAVENOUS at 08:07

## 2018-07-16 RX ADMIN — HYDROMORPHONE HYDROCHLORIDE 0.2 MG: 1 INJECTION, SOLUTION INTRAMUSCULAR; INTRAVENOUS; SUBCUTANEOUS at 11:07

## 2018-07-16 RX ADMIN — SODIUM CHLORIDE 1.8 UNITS/HR: 9 INJECTION, SOLUTION INTRAVENOUS at 05:07

## 2018-07-16 RX ADMIN — PROPOFOL 200 MG: 10 INJECTION, EMULSION INTRAVENOUS at 07:07

## 2018-07-16 RX ADMIN — Medication: at 04:07

## 2018-07-16 RX ADMIN — GLYCOPYRROLATE 0.2 MG: 0.2 INJECTION, SOLUTION INTRAMUSCULAR; INTRAVENOUS at 08:07

## 2018-07-16 RX ADMIN — SUGAMMADEX 500 MG: 100 INJECTION, SOLUTION INTRAVENOUS at 11:07

## 2018-07-16 RX ADMIN — Medication: at 11:07

## 2018-07-16 RX ADMIN — KETAMINE HYDROCHLORIDE 30 MG: 100 INJECTION, SOLUTION, CONCENTRATE INTRAMUSCULAR; INTRAVENOUS at 09:07

## 2018-07-16 RX ADMIN — HYDROCORTISONE SODIUM SUCCINATE 100 MG: 100 INJECTION, POWDER, FOR SOLUTION INTRAMUSCULAR; INTRAVENOUS at 07:07

## 2018-07-16 RX ADMIN — CEFAZOLIN 2 G: 1 INJECTION, POWDER, FOR SOLUTION INTRAMUSCULAR; INTRAVENOUS at 11:07

## 2018-07-16 RX ADMIN — SODIUM CHLORIDE 5 UNITS/HR: 9 INJECTION, SOLUTION INTRAVENOUS at 08:07

## 2018-07-16 RX ADMIN — DEXMEDETOMIDINE HYDROCHLORIDE 8 MCG: 100 INJECTION, SOLUTION INTRAVENOUS at 10:07

## 2018-07-16 RX ADMIN — ACETAMINOPHEN 1000 MG: 500 TABLET, COATED ORAL at 05:07

## 2018-07-16 RX ADMIN — DEXMEDETOMIDINE HYDROCHLORIDE 8 MCG: 100 INJECTION, SOLUTION INTRAVENOUS at 11:07

## 2018-07-16 RX ADMIN — SODIUM CHLORIDE, SODIUM GLUCONATE, SODIUM ACETATE, POTASSIUM CHLORIDE, MAGNESIUM CHLORIDE, SODIUM PHOSPHATE, DIBASIC, AND POTASSIUM PHOSPHATE: .53; .5; .37; .037; .03; .012; .00082 INJECTION, SOLUTION INTRAVENOUS at 09:07

## 2018-07-16 RX ADMIN — FENTANYL CITRATE 150 MCG: 50 INJECTION INTRAMUSCULAR; INTRAVENOUS at 07:07

## 2018-07-16 RX ADMIN — MIDAZOLAM HYDROCHLORIDE 1 MG: 1 INJECTION, SOLUTION INTRAMUSCULAR; INTRAVENOUS at 07:07

## 2018-07-16 RX ADMIN — ONDANSETRON 4 MG: 2 INJECTION INTRAMUSCULAR; INTRAVENOUS at 11:07

## 2018-07-16 RX ADMIN — ROCURONIUM BROMIDE 50 MG: 10 INJECTION, SOLUTION INTRAVENOUS at 09:07

## 2018-07-16 RX ADMIN — SODIUM CHLORIDE: 0.9 INJECTION, SOLUTION INTRAVENOUS at 06:07

## 2018-07-16 RX ADMIN — HYDROMORPHONE HYDROCHLORIDE 0.2 MG: 1 INJECTION, SOLUTION INTRAMUSCULAR; INTRAVENOUS; SUBCUTANEOUS at 12:07

## 2018-07-16 RX ADMIN — SODIUM CHLORIDE 1000 ML: 0.9 INJECTION, SOLUTION INTRAVENOUS at 11:07

## 2018-07-16 RX ADMIN — ATORVASTATIN CALCIUM 80 MG: 20 TABLET, FILM COATED ORAL at 09:07

## 2018-07-16 RX ADMIN — ACETAMINOPHEN 1000 MG: 10 INJECTION, SOLUTION INTRAVENOUS at 10:07

## 2018-07-16 RX ADMIN — LIDOCAINE HYDROCHLORIDE 100 MG: 20 INJECTION INTRAVENOUS at 07:07

## 2018-07-16 RX ADMIN — LIDOCAINE HYDROCHLORIDE 0.1 MG: 10 INJECTION, SOLUTION EPIDURAL; INFILTRATION; INTRACAUDAL; PERINEURAL at 06:07

## 2018-07-16 RX ADMIN — SODIUM CHLORIDE, SODIUM GLUCONATE, SODIUM ACETATE, POTASSIUM CHLORIDE, MAGNESIUM CHLORIDE, SODIUM PHOSPHATE, DIBASIC, AND POTASSIUM PHOSPHATE: .53; .5; .37; .037; .03; .012; .00082 INJECTION, SOLUTION INTRAVENOUS at 07:07

## 2018-07-16 RX ADMIN — ROCURONIUM BROMIDE 50 MG: 10 INJECTION, SOLUTION INTRAVENOUS at 08:07

## 2018-07-16 RX ADMIN — PYRIDOSTIGMINE BROMIDE 60 MG: 60 TABLET ORAL at 09:07

## 2018-07-16 NOTE — H&P
History & Physical     SUBJECTIVE:      History of Present Illness:  Patient is a 67 y.o. male presents with HTN, DM, HLD, MG (Ach-R ab +) with MG in March 2017 here today for evaluation of thymectomy. Experiences generalized weakness, fatigue, ptosis, slurred speech, and choking on liquids. Symptoms largely controlled with mestinon, prednisone and IVIG. Also started on Imuran 50mg daily by neurologist. Current regimen is Prednisone 30mg, Mestinon 60mg BID-TID, and IVIG [id number]. Last IVIG 1 week ago. ecreased muscle fatigue, has been working outside in the yard. He started following with cardiologist for SOB/BETHEA prior to MG diagnosis. Also reports mild orthopnea. He believes he has had a stress test in the past. Denies prior myasthenia crisis. Never been intubated for MG. Denies blood thinners. Denies fever, chills, CP, baseline SOB, appetite or weight changes.      Smokes 1 cigar daily. Occasional ETOH.   Appendectomy, bilateral inguinal hernia repairs, vasectomy  Retired mail .          Chief Complaint   Patient presents with    Consult         Review of patient's allergies indicates:  No Known Allergies     Current Medications          Current Outpatient Prescriptions   Medication Sig Dispense Refill    atorvastatin (LIPITOR) 80 MG tablet 80 mg.        azaTHIOprine (IMURAN) 50 mg Tab Take 1 tablet (50 mg total) by mouth 2 (two) times daily. Start AFTER IVIG infusion (monday) 60 tablet 11    isosorbide mononitrate (IMDUR) 30 MG 24 hr tablet 30 mg.        lisinopril (PRINIVIL,ZESTRIL) 40 MG tablet 40 mg.        metFORMIN (GLUCOPHAGE) 500 MG tablet Take 500 mg by mouth 2 (two) times daily.   3    omeprazole (PRILOSEC) 20 MG capsule Take 1 capsule (20 mg total) by mouth once daily. 30 capsule 11    ONETOUCH DELICA LANCETS 30 gauge Misc TEST BLOOD SUGAR ONCE DAILY   0    ONETOUCH ULTRA TEST Strp TEST BLOOD SUGAR ONCE DAILY   12    ONETOUCH ULTRA2 kit USE TO TEST BLOOD GLUCOSE DAILY   0     predniSONE (DELTASONE) 20 MG tablet Take 1.5 tablets (30 mg total) by mouth once daily. Take 30 mg (1 and a half pills) daily. 45 tablet 5    pyridostigmine (MESTINON) 60 mg Tab Take 1 tablet (60 mg total) by mouth 4 (four) times daily. Take mestinon 3-4 times per day. 120 tablet 11    sertraline (ZOLOFT) 50 MG tablet TAKE 1/2 TABLET BY MOUTH AT BEDTIME 15 tablet 3      No current facility-administered medications for this visit.             No past medical history on file.  No past surgical history on file.  No family history on file.        Social History   Substance Use Topics    Smoking status: Current Some Day Smoker       Types: Cigars    Smokeless tobacco: Never Used    Alcohol use Not on file         Review of Systems:  Review of Systems   Constitutional: Positive for fatigue. Negative for activity change, appetite change and fever.   HENT: Negative for congestion.    Eyes: Negative for pain.        Occasional blurry vision and ptosis with MG flare   Respiratory: Positive for shortness of breath.    Cardiovascular: Negative for chest pain and palpitations.   Gastrointestinal: Negative for abdominal pain, nausea and vomiting.        Occasional dysphagia on liquids   Genitourinary: Negative for difficulty urinating.   Musculoskeletal: Positive for arthralgias (muscle fatigue and weakness, nightly).   Skin: Negative for color change and rash.   Neurological: Positive for speech difficulty. Negative for dizziness.   Psychiatric/Behavioral: Negative for agitation. The patient is not nervous/anxious.          OBJECTIVE:      Vital Signs (Most Recent)  Vitals:    07/16/18 0554   BP: (!) 170/88   Pulse: (!) 58   Resp: 16   Temp: 98 °F (36.7 °C)          Physical Exam:  Physical Exam   Constitutional: He is oriented to person, place, and time. He appears well-developed and well-nourished.   HENT:   Head: Normocephalic and atraumatic.   Eyes: EOM are normal.   Neck: Normal range of motion. Neck supple. No  tracheal deviation present.   Cardiovascular: Normal rate and regular rhythm.    Pulmonary/Chest: Effort normal and breath sounds normal.   Abdominal: Soft.   Musculoskeletal: Normal range of motion.   Lymphadenopathy:     He has no cervical adenopathy.   Neurological: He is alert and oriented to person, place, and time.   Skin: Skin is warm and dry.   Psychiatric: He has a normal mood and affect. Thought content normal.   Vitals reviewed.        Laboratory  Chest CT May 2017:   Normal examination of the thorax. No evidence of thymoma or other thymic mass.   Colonic diverticulosis. \    PFT and Stress test reviewed     ASSESSMENT/PLAN:      Patient is a 67 y.o. male presents with HTN, DM, HLD, MG (Ach-R ab +) with MG in March 2017 here today for evaluation of thymectomy. Current regimen is Prednisone 30mg, Mestinon 60mg BID-TID, and IVIG u6uackm.     PLAN:Plan      Will proceed with right robotic assisted thymectomy, possible median sternotomy. Will have the patient receive IVIG 1-2 weeks prior to surgery depending on symptoms.  Appropriate patient education regarding the anastacia-operative period as well as intraoperative details were discussed. Risks, including but not limited to, bleeding, infection, pain and anesthetic complication were discussed. Patient was given the opportunity to ask questions and to have those questions answered to their satisfaction. Patient verbalized understanding to both procedure and associated risks. Consent was obtained.  Will need to sign blood consents day of surgery.   Distress Screening Results: Psychosocial Distress screening score of Distress Score: 0 noted and reviewed. No intervention indicated.      ATTENDING ATTESTATION:     I evaluated the patient and I agree with the assessment and plan.  I recommend a robotic thymectomy possible median sternotomy. Mr. Candelario should receive IVIG 1-2 weeks prior to surgery. I informed Mr. Candelario that the myasthenia symptoms may not resolve for  upwards of one year.  I have discussed the technical aspects, risks and benefits of the procedure with him.  I informed him that the risks are the most common risks and that there are other less likely risks that are too numerous to elaborate.  He is aware and has agreed to undergo the procedure as detailed on the consent form.

## 2018-07-16 NOTE — BRIEF OP NOTE
[facility]  Brief Operative Note    SUMMARY     Surgery Date: 7/16/2018     Surgeon(s) and Role:     * Yanni Ward MD - Resident - Assisting     * Ricky Hernandez MD - Primary        Pre-op Diagnosis:  Myasthenia gravis [G70.00]    Post-op Diagnosis:  Post-Op Diagnosis Codes:     * Myasthenia gravis [G70.00]    Procedure(s) (LRB):  STERNOTOMY median, thymectomy (N/A)  ROBOTIC LAPAROSCOPY, EXPLORATORY (N/A)    Anesthesia: General    Description of Procedure: Attempted robotic however, difficult anatomy, quickly converted to open for better exposures.     Description of the findings of the procedure: as above. Single mediastinal chest tube. Both pleura opened widely.    Estimated Blood Loss: 0 mL    Specimens:   Specimen (12h ago through future)    Start     Ordered    07/16/18 1007  Specimen to Pathology - Surgery  Once     Comments:  1.) Thymus - PERM      07/16/18 1006    07/16/18 1006  Specimen to Pathology - Surgery  Once,   Status:  Canceled      07/16/18 1006

## 2018-07-16 NOTE — ASSESSMENT & PLAN NOTE
Outpatient on orals only.   A1c pending    Post cardiac surgery -140 for first 24h.  Continue IIP with POC q1h.    Once rate and BG stable, will change to transition insulin.  Will likely have minimal requirements given home regimen.  Will also schedule prandial insulin once diet advanced.    Discharge:  TBD.

## 2018-07-16 NOTE — ANESTHESIA POSTPROCEDURE EVALUATION
"Anesthesia Post Evaluation    Patient: Otoniel Candelario    Procedure(s) Performed: Procedure(s) (LRB):  STERNOTOMY median, thymectomy (N/A)  ROBOTIC LAPAROSCOPY, EXPLORATORY (N/A)    Final Anesthesia Type: general  Patient location during evaluation: PACU  Patient participation: Yes- Able to Participate  Level of consciousness: awake and alert  Post-procedure vital signs: reviewed and stable  Pain management: adequate  Airway patency: patent  PONV status at discharge: No PONV  Anesthetic complications: no      Cardiovascular status: blood pressure returned to baseline  Respiratory status: unassisted  Hydration status: euvolemic  Follow-up not needed.        Visit Vitals  /66 (BP Location: Right arm, Patient Position: Lying)   Pulse 67   Temp 37.2 °C (98.9 °F) (Oral)   Resp 12   Ht 5' 11" (1.803 m)   Wt 103.4 kg (228 lb)   SpO2 (!) 92%   BMI 31.80 kg/m²       Pain/Carlyle Score: Pain Assessment Performed: Yes (7/16/2018 12:30 PM)  Presence of Pain: complains of pain/discomfort (7/16/2018 12:30 PM)  Pain Rating Prior to Med Admin: 9 (7/16/2018 12:47 PM)  Carlyle Score: 9 (7/16/2018 12:30 PM)      "

## 2018-07-16 NOTE — PROGRESS NOTES
Difficult IV start, 2 nurses, 4 tries. Called iv nurse at 0620. Also, Dr Daniel notified at 0625 of blood sugar this AM of 275.

## 2018-07-16 NOTE — CONSULTS
[facility]  Endocrinology  Diabetes Consult Note    Consult Requested by: Ricky Hernandez MD   Reason for admit: Myasthenia gravis    HISTORY OF PRESENT ILLNESS:  Reason for Consult: Management of T2DM, Hyperglycemia     Surgical Procedure and Date: Thymectomy 7/16/18    Home Diabetes Medications:    tradjenta 5mg daily  Metformin 1g BID  a1c pending    Diabetes Complications include:     Hyperglycemia    Complicating diabetes co morbidities:   Glucocorticoid use       HPI:   Patient is a 67 y.o. male with a diagnosis of T2DM (on oral mediation, a1c pending), HTN, HLD, MG (Ach-R ab +, on PDN 30mg, Imuran 50mg qd, mestinon 50mg BID-TID, and IVIG r6annlb - last round 1 week ago) admitted for thymectomy.     Endocrinology consulted to assist with BG management post cardiac surgery.    Interval HPI:   Overnight events:  AFVSS.  Patient resting in PACU.  Tolerated procedure well.  On O2.    PMH, PSH, FH, SH updated and reviewed     ROS:  Review of Systems   Constitutional: Negative for unexpected weight change.   HENT: Negative for trouble swallowing.    Eyes: Negative for visual disturbance.   Respiratory: Negative for shortness of breath.    Cardiovascular: Positive for chest pain (appropriate post op). Negative for palpitations and leg swelling.   Gastrointestinal: Negative for abdominal pain, constipation and diarrhea.   Genitourinary: Negative for urgency.   Musculoskeletal: Negative for arthralgias.   Skin: Negative for wound.   Neurological: Negative for headaches.   Hematological: Does not bruise/bleed easily.   Psychiatric/Behavioral: Negative for sleep disturbance.       Current Medications and/or Treatments Impacting Glycemic Control  Immunotherapy:    Immunosuppressants     None        Steroids:   Hormones     None        Pressors:    Autonomic Drugs     None        Hyperglycemia/Diabetes Medications:   Antihyperglycemics     Start     Stop Route Frequency Ordered    07/16/18 4167   insulin regular (Humulin R) 100 Units in sodium chloride 0.9% 100 mL infusion      -- IV Continuous 07/16/18 1124             PHYSICAL EXAMINATION:  Vitals:    07/16/18 1500   BP: 135/67   Pulse: 67   Resp: 20   Temp: 98 °F (36.7 °C)     Body mass index is 31.8 kg/m².    Physical Exam   Constitutional: He appears well-developed. No distress.   Appropriate discomfort   HENT:   Right Ear: External ear normal.   Left Ear: External ear normal.   Nose: Nose normal.   Hearing normal  Dentition good   Neck: No tracheal deviation present. No thyromegaly present.   Cardiovascular: Normal rate.    No murmur heard.  Chest appropriately dressed.  Post op tenderness   Pulmonary/Chest: Effort normal and breath sounds normal.   Abdominal: Soft. There is no tenderness. No hernia.   Musculoskeletal: He exhibits no edema, tenderness or deformity.   Neurological: He is alert. He has normal reflexes. No cranial nerve deficit.   Skin: No rash noted. He is not diaphoretic.   No nodules   Psychiatric: He has a normal mood and affect. Judgment normal.   Vitals reviewed.        Labs Reviewed and Include     Recent Labs  Lab 07/16/18  1135   *   CALCIUM 8.1*      K 4.1   CO2 27      BUN 11   CREATININE 1.0     Lab Results   Component Value Date    WBC 12.34 07/16/2018    HGB 12.0 (L) 07/16/2018    HCT 34.9 (L) 07/16/2018    MCV 89 07/16/2018     07/16/2018     No results for input(s): TSH, FREET4 in the last 168 hours.  No results found for: HGBA1C    Nutritional status:   Body mass index is 31.8 kg/m².  Lab Results   Component Value Date    ALBUMIN 3.9 04/10/2018    ALBUMIN 3.4 (L) 03/13/2018    ALBUMIN 3.7 10/10/2017     No results found for: PREALBUMIN    Estimated Creatinine Clearance: 87.7 mL/min (based on SCr of 1 mg/dL).    Accu-Checks  Recent Labs      07/16/18   0602  07/16/18   1204  07/16/18   1302  07/16/18   1403  07/16/18   1517   POCTGLUCOSE  275*  270*  212*  174*  142*        ASSESSMENT and  PLAN    * Myasthenia gravis      On chronic steroids, managed per neurology.  S/p Thymectomy.    Avoid hypoglycemia.        Current chronic use of systemic steroids    Can cause prandial excursions          Type 2 diabetes mellitus      Outpatient on orals only.   A1c pending    Post cardiac surgery -140 for first 24h.  Continue IIP with POC q1h.    Once rate and BG stable, will change to transition insulin.  Will likely have minimal requirements given home regimen.  Will also schedule prandial insulin once diet advanced.    Discharge:  TBD.            Plan discussed with patient, family, and RN at bedside.     Maribeth French MD  Endocrinology  [facility], Marie Elena MD,  have personally taken the history and examined the patient and agree with the resident's note as stated above.

## 2018-07-16 NOTE — HPI
Reason for Consult: Management of T2DM, Hyperglycemia     Surgical Procedure and Date: Thymectomy 7/16/18    Home Diabetes Medications:    tradjenta 5mg daily  Metformin 1g BID    HbA1c: 10.2    Diabetes Complications include:     Hyperglycemia    Complicating diabetes co morbidities:   Glucocorticoid use       HPI:   Patient is a 67 y.o. male with a diagnosis of T2DM (on oral mediation, a1c pending), HTN, HLD, MG (Ach-R ab +, on PDN 30mg, Imuran 50mg qd, mestinon 50mg BID-TID, and IVIG c4oddls - last round 1 week ago) admitted for thymectomy.     Endocrinology consulted to assist with BG management post cardiac surgery.

## 2018-07-16 NOTE — NURSING TRANSFER
Nursing Transfer Note      7/16/2018     Transfer To: 605    Transfer via bed    Transfer with 2L O2, cardiac monitoring    Transported by RN, PCT    Medicines sent: IVF, PCA, Insulin gtt    Chart send with patient: Yes    Notified: spouse    Patient reassessed at: 7/16/18, 1500    Upon arrival to floor: cardiac monitor applied, patient oriented to room, call bell in reach and bed in lowest position

## 2018-07-16 NOTE — SUBJECTIVE & OBJECTIVE
Interval HPI:   Overnight events:  AFVSS.  Patient resting in PACU.  Tolerated procedure well.  On O2.    PMH, PSH, FH, SH updated and reviewed     ROS:  Review of Systems   Constitutional: Negative for unexpected weight change.   HENT: Negative for trouble swallowing.    Eyes: Negative for visual disturbance.   Respiratory: Negative for shortness of breath.    Cardiovascular: Positive for chest pain (appropriate post op). Negative for palpitations and leg swelling.   Gastrointestinal: Negative for abdominal pain, constipation and diarrhea.   Genitourinary: Negative for urgency.   Musculoskeletal: Negative for arthralgias.   Skin: Negative for wound.   Neurological: Negative for headaches.   Hematological: Does not bruise/bleed easily.   Psychiatric/Behavioral: Negative for sleep disturbance.       Current Medications and/or Treatments Impacting Glycemic Control  Immunotherapy:    Immunosuppressants     None        Steroids:   Hormones     None        Pressors:    Autonomic Drugs     None        Hyperglycemia/Diabetes Medications:   Antihyperglycemics     Start     Stop Route Frequency Ordered    07/16/18 1215  insulin regular (Humulin R) 100 Units in sodium chloride 0.9% 100 mL infusion      -- IV Continuous 07/16/18 1124             PHYSICAL EXAMINATION:  Vitals:    07/16/18 1500   BP: 135/67   Pulse: 67   Resp: 20   Temp: 98 °F (36.7 °C)     Body mass index is 31.8 kg/m².    Physical Exam   Constitutional: He appears well-developed. No distress.   Appropriate discomfort   HENT:   Right Ear: External ear normal.   Left Ear: External ear normal.   Nose: Nose normal.   Hearing normal  Dentition good   Neck: No tracheal deviation present. No thyromegaly present.   Cardiovascular: Normal rate.    No murmur heard.  Chest appropriately dressed.  Post op tenderness   Pulmonary/Chest: Effort normal and breath sounds normal.   Abdominal: Soft. There is no tenderness. No hernia.   Musculoskeletal: He exhibits no edema,  tenderness or deformity.   Neurological: He is alert. He has normal reflexes. No cranial nerve deficit.   Skin: No rash noted. He is not diaphoretic.   No nodules   Psychiatric: He has a normal mood and affect. Judgment normal.   Vitals reviewed.

## 2018-07-16 NOTE — NURSING TRANSFER
Nursing Transfer Note      7/16/2018     Transfer From: PACU    Transfer via stretcher    Transfer with  to O2, cardiac monitoring    Transported by Transport    Medicines sent: No    Chart send with patient: Yes    Notified: spouse, friend    Patient reassessed at: 1645 on 7/16/2018    Upon arrival to floor: cardiac monitor applied, patient oriented to room, call bell in reach and bed in lowest position

## 2018-07-17 LAB
ANION GAP SERPL CALC-SCNC: 10 MMOL/L
BASOPHILS # BLD AUTO: 0.02 K/UL
BASOPHILS NFR BLD: 0.2 %
BUN SERPL-MCNC: 13 MG/DL
CALCIUM SERPL-MCNC: 9.4 MG/DL
CHLORIDE SERPL-SCNC: 100 MMOL/L
CO2 SERPL-SCNC: 28 MMOL/L
CREAT SERPL-MCNC: 0.8 MG/DL
DIFFERENTIAL METHOD: ABNORMAL
EOSINOPHIL # BLD AUTO: 0 K/UL
EOSINOPHIL NFR BLD: 0.1 %
ERYTHROCYTE [DISTWIDTH] IN BLOOD BY AUTOMATED COUNT: 14.4 %
EST. GFR  (AFRICAN AMERICAN): >60 ML/MIN/1.73 M^2
EST. GFR  (NON AFRICAN AMERICAN): >60 ML/MIN/1.73 M^2
GLUCOSE SERPL-MCNC: 117 MG/DL
HCT VFR BLD AUTO: 40.3 %
HGB BLD-MCNC: 12.8 G/DL
IMM GRANULOCYTES # BLD AUTO: 0.03 K/UL
IMM GRANULOCYTES NFR BLD AUTO: 0.2 %
LYMPHOCYTES # BLD AUTO: 1.7 K/UL
LYMPHOCYTES NFR BLD: 14 %
MAGNESIUM SERPL-MCNC: 2 MG/DL
MCH RBC QN AUTO: 29.8 PG
MCHC RBC AUTO-ENTMCNC: 31.8 G/DL
MCV RBC AUTO: 94 FL
MONOCYTES # BLD AUTO: 1.2 K/UL
MONOCYTES NFR BLD: 9.9 %
NEUTROPHILS # BLD AUTO: 9.1 K/UL
NEUTROPHILS NFR BLD: 75.6 %
NRBC BLD-RTO: 0 /100 WBC
PHOSPHATE SERPL-MCNC: 4 MG/DL
PLATELET # BLD AUTO: 196 K/UL
PMV BLD AUTO: 10.5 FL
POCT GLUCOSE: 108 MG/DL (ref 70–110)
POCT GLUCOSE: 113 MG/DL (ref 70–110)
POCT GLUCOSE: 113 MG/DL (ref 70–110)
POCT GLUCOSE: 116 MG/DL (ref 70–110)
POCT GLUCOSE: 121 MG/DL (ref 70–110)
POCT GLUCOSE: 122 MG/DL (ref 70–110)
POCT GLUCOSE: 124 MG/DL (ref 70–110)
POCT GLUCOSE: 158 MG/DL (ref 70–110)
POCT GLUCOSE: 185 MG/DL (ref 70–110)
POCT GLUCOSE: 80 MG/DL (ref 70–110)
POCT GLUCOSE: 96 MG/DL (ref 70–110)
POCT GLUCOSE: 98 MG/DL (ref 70–110)
POTASSIUM SERPL-SCNC: 4.2 MMOL/L
RBC # BLD AUTO: 4.3 M/UL
SODIUM SERPL-SCNC: 138 MMOL/L
WBC # BLD AUTO: 12.05 K/UL

## 2018-07-17 PROCEDURE — 63600175 PHARM REV CODE 636 W HCPCS: Performed by: STUDENT IN AN ORGANIZED HEALTH CARE EDUCATION/TRAINING PROGRAM

## 2018-07-17 PROCEDURE — 80048 BASIC METABOLIC PNL TOTAL CA: CPT

## 2018-07-17 PROCEDURE — 85025 COMPLETE CBC W/AUTO DIFF WBC: CPT

## 2018-07-17 PROCEDURE — 99231 SBSQ HOSP IP/OBS SF/LOW 25: CPT | Mod: ,,, | Performed by: INTERNAL MEDICINE

## 2018-07-17 PROCEDURE — 97530 THERAPEUTIC ACTIVITIES: CPT

## 2018-07-17 PROCEDURE — 25000003 PHARM REV CODE 250: Performed by: STUDENT IN AN ORGANIZED HEALTH CARE EDUCATION/TRAINING PROGRAM

## 2018-07-17 PROCEDURE — 25000003 PHARM REV CODE 250: Performed by: PHYSICIAN ASSISTANT

## 2018-07-17 PROCEDURE — 25000003 PHARM REV CODE 250: Performed by: SURGERY

## 2018-07-17 PROCEDURE — 94799 UNLISTED PULMONARY SVC/PX: CPT

## 2018-07-17 PROCEDURE — 83735 ASSAY OF MAGNESIUM: CPT

## 2018-07-17 PROCEDURE — 20600001 HC STEP DOWN PRIVATE ROOM

## 2018-07-17 PROCEDURE — 63600175 PHARM REV CODE 636 W HCPCS: Performed by: SURGERY

## 2018-07-17 PROCEDURE — 84100 ASSAY OF PHOSPHORUS: CPT

## 2018-07-17 PROCEDURE — 97161 PT EVAL LOW COMPLEX 20 MIN: CPT

## 2018-07-17 RX ORDER — RAMELTEON 8 MG/1
8 TABLET ORAL NIGHTLY
Status: DISCONTINUED | OUTPATIENT
Start: 2018-07-17 | End: 2018-07-17

## 2018-07-17 RX ORDER — GLUCAGON 1 MG
1 KIT INJECTION
Status: DISCONTINUED | OUTPATIENT
Start: 2018-07-17 | End: 2018-07-19 | Stop reason: HOSPADM

## 2018-07-17 RX ORDER — OXYCODONE HYDROCHLORIDE 5 MG/1
10 TABLET ORAL EVERY 4 HOURS PRN
Status: DISCONTINUED | OUTPATIENT
Start: 2018-07-17 | End: 2018-07-19 | Stop reason: HOSPADM

## 2018-07-17 RX ORDER — OXYCODONE HYDROCHLORIDE 5 MG/1
5 TABLET ORAL EVERY 4 HOURS PRN
Status: DISCONTINUED | OUTPATIENT
Start: 2018-07-17 | End: 2018-07-19 | Stop reason: HOSPADM

## 2018-07-17 RX ORDER — RAMELTEON 8 MG/1
8 TABLET ORAL NIGHTLY PRN
Status: DISCONTINUED | OUTPATIENT
Start: 2018-07-17 | End: 2018-07-18

## 2018-07-17 RX ORDER — IBUPROFEN 200 MG
24 TABLET ORAL
Status: DISCONTINUED | OUTPATIENT
Start: 2018-07-17 | End: 2018-07-19 | Stop reason: HOSPADM

## 2018-07-17 RX ORDER — HYDROMORPHONE HYDROCHLORIDE 1 MG/ML
1 INJECTION, SOLUTION INTRAMUSCULAR; INTRAVENOUS; SUBCUTANEOUS
Status: DISPENSED | OUTPATIENT
Start: 2018-07-17 | End: 2018-07-18

## 2018-07-17 RX ORDER — IBUPROFEN 200 MG
16 TABLET ORAL
Status: DISCONTINUED | OUTPATIENT
Start: 2018-07-17 | End: 2018-07-19 | Stop reason: HOSPADM

## 2018-07-17 RX ORDER — INSULIN ASPART 100 [IU]/ML
0-5 INJECTION, SOLUTION INTRAVENOUS; SUBCUTANEOUS
Status: DISCONTINUED | OUTPATIENT
Start: 2018-07-17 | End: 2018-07-19 | Stop reason: HOSPADM

## 2018-07-17 RX ADMIN — ASPIRIN 81 MG: 81 TABLET, COATED ORAL at 08:07

## 2018-07-17 RX ADMIN — PYRIDOSTIGMINE BROMIDE 60 MG: 60 TABLET ORAL at 09:07

## 2018-07-17 RX ADMIN — Medication 1 MG: at 07:07

## 2018-07-17 RX ADMIN — OXYCODONE HYDROCHLORIDE 10 MG: 5 TABLET ORAL at 05:07

## 2018-07-17 RX ADMIN — CEFAZOLIN 2 G: 1 INJECTION, POWDER, FOR SOLUTION INTRAMUSCULAR; INTRAVENOUS at 04:07

## 2018-07-17 RX ADMIN — ENOXAPARIN SODIUM 40 MG: 100 INJECTION SUBCUTANEOUS at 08:07

## 2018-07-17 RX ADMIN — PYRIDOSTIGMINE BROMIDE 60 MG: 60 TABLET ORAL at 08:07

## 2018-07-17 RX ADMIN — PREDNISONE 30 MG: 20 TABLET ORAL at 08:07

## 2018-07-17 RX ADMIN — PANTOPRAZOLE SODIUM 40 MG: 40 TABLET, DELAYED RELEASE ORAL at 08:07

## 2018-07-17 RX ADMIN — RAMELTEON 8 MG: 8 TABLET, FILM COATED ORAL at 02:07

## 2018-07-17 RX ADMIN — ATORVASTATIN CALCIUM 80 MG: 20 TABLET, FILM COATED ORAL at 09:07

## 2018-07-17 RX ADMIN — POLYETHYLENE GLYCOL 3350 17 G: 17 POWDER, FOR SOLUTION ORAL at 08:07

## 2018-07-17 RX ADMIN — ONDANSETRON 4 MG: 2 INJECTION INTRAMUSCULAR; INTRAVENOUS at 04:07

## 2018-07-17 RX ADMIN — CEFAZOLIN 2 G: 1 INJECTION, POWDER, FOR SOLUTION INTRAMUSCULAR; INTRAVENOUS at 07:07

## 2018-07-17 RX ADMIN — ACETAMINOPHEN 1000 MG: 500 TABLET, COATED ORAL at 02:07

## 2018-07-17 RX ADMIN — SENNOSIDES AND DOCUSATE SODIUM 2 TABLET: 8.6; 5 TABLET ORAL at 08:07

## 2018-07-17 RX ADMIN — OXYCODONE HYDROCHLORIDE 10 MG: 5 TABLET ORAL at 10:07

## 2018-07-17 RX ADMIN — Medication: at 07:07

## 2018-07-17 RX ADMIN — CEFAZOLIN 2 G: 1 INJECTION, POWDER, FOR SOLUTION INTRAMUSCULAR; INTRAVENOUS at 11:07

## 2018-07-17 RX ADMIN — PYRIDOSTIGMINE BROMIDE 60 MG: 60 TABLET ORAL at 02:07

## 2018-07-17 RX ADMIN — OXYCODONE HYDROCHLORIDE 10 MG: 5 TABLET ORAL at 11:07

## 2018-07-17 NOTE — ASSESSMENT & PLAN NOTE
67 year old male POD1 s/p median sternotomy for thymectomy    - Chest tube to water seal   - DC milan  - DC PCA. Start PO PRN narcotics  - Wean NC O2 for saturations greater than 88%  - PT/OT with sternal prevautions   - IS/Duo Nebs   - Home myasthenia meds restarted

## 2018-07-17 NOTE — CONSULTS
"  [facility]  Adult Nutrition  Consult Note    SUMMARY     Recommendations    Recommendation/Intervention:     1. Continue cardiac diet.   2. Add boost glucose control with meals per pt request - pt with poor po intake.   3. Encourage po intake   4. RD following.     Goals: meet >50% EEN/EPN  Nutrition Goal Status: new  Communication of RD Recs:  (POC)    Reason for Assessment    Reason for Assessment: consult  Diagnosis: other (see comments) (myasthenia gravis )  Relevant Medical History: HTN, T2DM, HLD  Interdisciplinary Rounds: attended  General Information Comments: S/p ex lap, sternotomy, thymectomy (7/16/18). Pt reports poor appetite. States that he is still trying to eat. Would like boost glucose control with meals. Denies N/V/D/C. Reports no wt change. Appears well-nourished. RD does not feel that pt meets malnutrition criteria at this time.   Nutrition Discharge Planning: adequate po intake    Nutrition Risk Screen    Nutrition Risk Screen: no indicators present    Nutrition/Diet History    Do you have any cultural, spiritual, Anglican conflicts, given your current situation?: no  Factors Affecting Nutritional Intake: decreased appetite    Anthropometrics    Temp: 98.1 °F (36.7 °C)  Height Method: Stated  Height: 5' 11" (180.3 cm)  Height (inches): 71 in  Weight Method: Bed Scale  Weight: 103.4 kg (227 lb 15.3 oz)  Weight (lb): 227.96 lb  Ideal Body Weight (IBW), Male: 172 lb  % Ideal Body Weight, Male (lb): 132.53 lb  BMI (Calculated): 31.9  BMI Grade: (P) 30 - 34.9- obesity - grade I  Usual Body Weight (UBW), kg: (P) 108.3 kg (per chart review 3/13/18)  % Usual Body Weight: (P) 95.68  % Weight Change From Usual Weight: (P) -4.53 %     Lab/Procedures/Meds    Pertinent Labs Reviewed: reviewed  Pertinent Labs Comments: glucose 274  Pertinent Medications Reviewed: reviewed  Pertinent Medications Comments: IVF, insulin, acetaminophen, aspirin, statin, pantoprazole, prednisone, docusate "     Physical Findings/Assessment    Overall Physical Appearance: nourished  Skin: incision(s)    Estimated/Assessed Needs    Weight Used For Calorie Calculations: 103.4 kg (227 lb 15.3 oz)  Energy Calorie Requirements (kcal): 1831 kcal/day  Energy Need Method: [place]  Protein Requirements: 103-134 gm/day (1.0-1.3 gm/kg)  Weight Used For Protein Calculations: 103.4 kg (227 lb 15.3 oz)  Fluid Requirements (mL): per MD     RDA Method (mL): 1831     Nutrition Prescription Ordered    Current Diet Order: cardiac diet     Evaluation of Received Nutrient/Fluid Intake    Tolerance: tolerating  % Intake of Estimated Energy Needs: 0 - 25 %  % Meal Intake: 0 - 25 %    Nutrition Risk    Level of Risk/Frequency of Follow-up: low (f/u 1 x wk)     Assessment and Plan    Nutrition Problem  Inadequate Oral Intake    Related to (etiology):   Decreased appetite    Signs and Symptoms (as evidenced by):   Pt reports poor appetite and eating <25% of meals.      Nutrition Diagnosis Status:   New    Monitor and Evaluation    Food and Nutrient Intake: energy intake, food and beverage intake  Food and Nutrient Adminstration: diet order  Physical Activity and Function: nutrition-related ADLs and IADLs  Anthropometric Measurements: body mass index, weight change, weight  Biochemical Data, Medical Tests and Procedures: electrolyte and renal panel, inflammatory profile, glucose/endocrine profile, gastrointestinal profile, lipid profile  Nutrition-Focused Physical Findings: overall appearance     Nutrition Follow-Up    RD Follow-up?: Yes

## 2018-07-17 NOTE — CONSULTS
"[facility]  Endocrinology  Diabetes Consult Note    Consult Requested by: Ricky Hernandez MD   Reason for admit: Myasthenia gravis    HISTORY OF PRESENT ILLNESS:  Reason for Consult: Management of T2DM, Hyperglycemia     Surgical Procedure and Date: Thymectomy 7/16/18    Home Diabetes Medications:    tradjenta 5mg daily  Metformin 1g BID  a1c pending    Diabetes Complications include:     Hyperglycemia    Complicating diabetes co morbidities:   Glucocorticoid use       HPI:   Patient is a 67 y.o. male with a diagnosis of T2DM (on oral mediation, a1c pending), HTN, HLD, MG (Ach-R ab +, on PDN 30mg, Imuran 50mg qd, mestinon 50mg BID-TID, and IVIG e0seipf - last round 1 week ago) admitted for thymectomy.     Endocrinology consulted to assist with BG management post cardiac surgery.    Interval HPI:   Overnight events:  VS stable.   Currently on intensive insulin drip at 1.5 units/h. Insulin drip was discontinued overnight when pt's BG dropped to 80 mg/dl. Was restarted at 6AM.    Eating: On clear liquid diet   Nausea: No  Hypoglycemia and intervention: No  Fever: No  TPN and/or TF: No    BP (!) 142/65 (BP Location: Right arm, Patient Position: Lying)   Pulse 66   Temp 98.4 °F (36.9 °C) (Oral)   Resp 20   Ht 5' 11" (1.803 m)   Wt 103.4 kg (227 lb 15.3 oz)   SpO2 98%   BMI 31.79 kg/m²      Labs Reviewed and Include      Recent Labs  Lab 07/17/18  0701   *   CALCIUM 9.4      K 4.2   CO2 28      BUN 13   CREATININE 0.8     Lab Results   Component Value Date    WBC 12.05 07/17/2018    HGB 12.8 (L) 07/17/2018    HCT 40.3 07/17/2018    MCV 94 07/17/2018     07/17/2018     No results for input(s): TSH, FREET4 in the last 168 hours.  No results found for: HGBA1C    Nutritional status:   Body mass index is 31.79 kg/m².  Lab Results   Component Value Date    ALBUMIN 3.9 04/10/2018    ALBUMIN 3.4 (L) 03/13/2018    ALBUMIN 3.7 10/10/2017     No results found for: " PREALBUMIN    Estimated Creatinine Clearance: 109.6 mL/min (based on SCr of 0.8 mg/dL).    Accu-Checks  Recent Labs      07/16/18 2126 07/16/18 2221 07/16/18   2325  07/17/18   0036  07/17/18   0139  07/17/18   0259  07/17/18   0413  07/17/18   0455  07/17/18   0603  07/17/18   0642   POCTGLUCOSE  119*  124*  116*  121*  113*  80  98  96  124*  113*       Current Medications and/or Treatments Impacting Glycemic Control  Immunotherapy:  Immunosuppressants     None        Steroids:   Hormones     Start     Stop Route Frequency Ordered    07/17/18 0900  predniSONE tablet 30 mg      -- Oral Daily 07/16/18 1815        Pressors:    Autonomic Drugs     Start     Stop Route Frequency Ordered    07/16/18 1830  pyridostigmine tablet 60 mg      -- Oral 4 times daily 07/16/18 1816        Hyperglycemia/Diabetes Medications: Antihyperglycemics     Start     Stop Route Frequency Ordered    07/16/18 1215  insulin regular (Humulin R) 100 Units in sodium chloride 0.9% 100 mL infusion      -- IV Continuous 07/16/18 1124          Labs Reviewed and Include     Recent Labs  Lab 07/17/18  0701   *   CALCIUM 9.4      K 4.2   CO2 28      BUN 13   CREATININE 0.8     Lab Results   Component Value Date    WBC 12.05 07/17/2018    HGB 12.8 (L) 07/17/2018    HCT 40.3 07/17/2018    MCV 94 07/17/2018     07/17/2018     No results for input(s): TSH, FREET4 in the last 168 hours.  No results found for: HGBA1C    Nutritional status:   Body mass index is 31.79 kg/m².  Lab Results   Component Value Date    ALBUMIN 3.9 04/10/2018    ALBUMIN 3.4 (L) 03/13/2018    ALBUMIN 3.7 10/10/2017     No results found for: PREALBUMIN    Estimated Creatinine Clearance: 109.6 mL/min (based on SCr of 0.8 mg/dL).    Accu-Checks  Recent Labs      07/16/18 2126 07/16/18 2221 07/16/18   2325  07/17/18   0036  07/17/18   0139  07/17/18   0259  07/17/18   0413  07/17/18   0455  07/17/18   0603  07/17/18   0642   POCTGLUCOSE  119*  124*   116*  121*  113*  80  98  96  124*  113*        ASSESSMENT and PLAN    * Myasthenia gravis      On chronic steroids, managed per neurology.  S/p Thymectomy.    Avoid hypoglycemia.        Current chronic use of systemic steroids    Can cause prandial excursions          Type 2 diabetes mellitus    Outpatient on orals only.   A1c pending    Post cardiac surgery -140 for first 24h.    Pt's BG levels were well controlled overnight off the insulin drip.  Will likely have minimal requirements given home regimen.  Discontinue insulin drip.  Will start low dose correction.  Will also schedule prandial insulin once diet advanced.    Discharge:  TBD.            Plan discussed with patient, family, and RN at bedside.     Andria Tse MD  Endocrinology  [facility], Marie Elena MD,  have personally taken the history and examined the patient and agree with the resident's note as stated above.

## 2018-07-17 NOTE — PLAN OF CARE
[facility]    HOME HEALTH ORDERS  FACE TO FACE ENCOUNTER    Patient Name: Otoniel Candelario  YOB: 1950    PCP: Win Elliott MD   PCP Address: [address] / [place] [address]  PCP Phone Number: [phone]  PCP Fax: [phone]    Encounter Date: 07/17/2018    Admit to Home Health    Diagnoses:  Active Hospital Problems    Diagnosis  POA    *Myasthenia gravis [G70.00]  Yes    Type 2 diabetes mellitus [E11.9]  Yes    Current chronic use of systemic steroids [Z79.52]  Not Applicable      Resolved Hospital Problems    Diagnosis Date Resolved POA   No resolved problems to display.       Future Appointments  Date Time Provider Department Center   7/24/2018 10:00 AM Keara Vanegas MD [facility] NEURO [facility]           I have seen and examined this patient face to face today. My clinical findings that support the need for the home health skilled services and home bound status are the following:  Weakness/numbness causing balance and gait disturbance due to Weakness/Debility and Surgery making it taxing to leave home.  Medical restrictions requiring assistance of another human to leave home due to  Dyspnea on exertion (SOB) and Post surgery monitoring.    Allergies:  Review of patient's allergies indicates:   Allergen Reactions    Demerol [meperidine] Anxiety     Hyper/ aggitation       Diet: cardiac diet    Activities: Sternal precautions- Do not lift more than 10 pounds. No pushing or pulling with your arms. Do not reach behind your back or reach both arms out to the side. Do not reach both arms overhead.    Nursing:   SN to complete comprehensive assessment including routine vital signs. Instruct on disease process and s/s of complications to report to MD. Review/verify medication list sent home with the patient at time of discharge  and instruct patient/caregiver as needed. Frequency may be adjusted depending on start of care date.    Notify MD if SBP > 160 or < 90; DBP >  90 or < 50; HR > 120 or < 50; Temp > 101; Other:         CONSULTS:    Physical Therapy to evaluate and treat. Evaluate for home safety and equipment needs; Establish/upgrade home exercise program. Perform / instruct on therapeutic exercises, gait training, transfer training, and Range of Motion.  Occupational Therapy to evaluate and treat. Evaluate home environment for safety and equipment needs. Perform/Instruct on transfers, ADL training, ROM, and therapeutic exercises.    MISCELLANEOUS CARE:  N/A    WOUND CARE ORDERS  n/a      Medications: Review discharge medications with patient and family and provide education.      Current Discharge Medication List      CONTINUE these medications which have NOT CHANGED    Details   aspirin (ECOTRIN) 81 MG EC tablet Take 81 mg by mouth once daily.      atorvastatin (LIPITOR) 80 MG tablet Take 80 mg by mouth every evening.       azaTHIOprine (IMURAN) 50 mg Tab Take 1 tablet (50 mg total) by mouth 2 (two) times daily. Start AFTER IVIG infusion (monday)  Qty: 60 tablet, Refills: 11      isosorbide mononitrate (IMDUR) 30 MG 24 hr tablet Take 30 mg by mouth every morning.       linagliptin (TRADJENTA) 5 mg Tab tablet Take 5 mg by mouth every morning.      lisinopril (PRINIVIL,ZESTRIL) 40 MG tablet Take 40 mg by mouth 2 (two) times daily.       metFORMIN (GLUCOPHAGE) 1000 MG tablet Take 1,000 mg by mouth 2 (two) times daily with meals.      omeprazole (PRILOSEC) 20 MG capsule Take 1 capsule (20 mg total) by mouth once daily.  Qty: 30 capsule, Refills: 11      predniSONE (DELTASONE) 20 MG tablet Take 1.5 tablets (30 mg total) by mouth once daily. Take 30 mg (1 and a half pills) daily.  Qty: 45 tablet, Refills: 5    Comments: Please confirm patient has been taking 30 mg and he's been doing this with 1.5 tablets of the 30 mg pills.  Associated Diagnoses: Myasthenia gravis      pyridostigmine (MESTINON) 60 mg Tab Take 1 tablet (60 mg total) by mouth 4 (four) times daily. Take mestinon  3-4 times per day.  Qty: 120 tablet, Refills: 11      sertraline (ZOLOFT) 50 MG tablet TAKE 1/2 TABLET BY MOUTH AT BEDTIME  Qty: 15 tablet, Refills: 3      ONETOUCH DELICA LANCETS 30 gauge Misc TEST BLOOD SUGAR ONCE DAILY  Refills: 0      ONETOUCH ULTRA TEST Strp TEST BLOOD SUGAR ONCE DAILY  Refills: 12      ONETOUCH ULTRA2 kit USE TO TEST BLOOD GLUCOSE DAILY  Refills: 0             I certify that this patient is confined to his home and needs physical therapy and occupational therapy.

## 2018-07-17 NOTE — HOSPITAL COURSE
7/16/18- Attempted robotic converted to median sternotomy for thymectomy. Restarted on home myasthenia meds on POD0.    7/17/18- DC milan. Chest tube to water seal. Advance diet. Wean NC O2. Step down to floor.   7/18/18 -  Remove chest tube today. Wean to oxygen   7/19/18 - Stable for discharge. Endo discharge recs for insulin.  PT/OT set up.

## 2018-07-17 NOTE — PLAN OF CARE
S/p STERNOTOMY median, thymectomy (N/A)  ROBOTIC LAPAROSCOPY, EXPLORATORY (N/A) on 7/16/18. CM visited with pt to discuss dc plan. Pt lives in [facility]. He states he is independent at home. Will follow PT/OT recommendations.        07/17/18 0932   Discharge Assessment   Assessment Type Discharge Planning Assessment   Confirmed/corrected address and phone number on facesheet? Yes   Assessment information obtained from? Patient   Expected Length of Stay (days) 3   Communicated expected length of stay with patient/caregiver yes   Prior to hospitilization cognitive status: Alert/Oriented   Prior to hospitalization functional status: Independent   Current cognitive status: Alert/Oriented   Current Functional Status: Independent   Lives With spouse   Able to Return to Prior Arrangements yes   Is patient able to care for self after discharge? Yes   Who are your caregiver(s) and their phone number(s)? Rhonda, wife [phone]   Patient's perception of discharge disposition home or selfcare   Readmission Within The Last 30 Days no previous admission in last 30 days   Patient currently being followed by outpatient case management? No   Patient currently receives any other outside agency services? No   Equipment Currently Used at Home none   Do you have any problems affording any of your prescribed medications? No   Is the patient taking medications as prescribed? yes   Does the patient have transportation home? Yes   Transportation Available family or friend will provide   Does the patient receive services at the [facility] Clinic? No   Discharge Plan A Home with family   Discharge Plan B Home with family;Home Health   Patient/Family In Agreement With Plan yes

## 2018-07-17 NOTE — SUBJECTIVE & OBJECTIVE
"Interval HPI:   Overnight events:  VS stable.   Currently on intensive insulin drip at 1.5 units/h. Insulin drip was discontinued overnight when pt's BG dropped to 80 mg/dl. Was restarted at 6AM.    Eating: On clear liquid diet   Nausea: No  Hypoglycemia and intervention: No  Fever: No  TPN and/or TF: No    BP (!) 142/65 (BP Location: Right arm, Patient Position: Lying)   Pulse 66   Temp 98.4 °F (36.9 °C) (Oral)   Resp 20   Ht 5' 11" (1.803 m)   Wt 103.4 kg (227 lb 15.3 oz)   SpO2 98%   BMI 31.79 kg/m²     Labs Reviewed and Include      Recent Labs  Lab 07/17/18  0701   *   CALCIUM 9.4      K 4.2   CO2 28      BUN 13   CREATININE 0.8     Lab Results   Component Value Date    WBC 12.05 07/17/2018    HGB 12.8 (L) 07/17/2018    HCT 40.3 07/17/2018    MCV 94 07/17/2018     07/17/2018     No results for input(s): TSH, FREET4 in the last 168 hours.  No results found for: HGBA1C    Nutritional status:   Body mass index is 31.79 kg/m².  Lab Results   Component Value Date    ALBUMIN 3.9 04/10/2018    ALBUMIN 3.4 (L) 03/13/2018    ALBUMIN 3.7 10/10/2017     No results found for: PREALBUMIN    Estimated Creatinine Clearance: 109.6 mL/min (based on SCr of 0.8 mg/dL).    Accu-Checks  Recent Labs      07/16/18   2126  07/16/18   2221  07/16/18   2325  07/17/18   0036  07/17/18   0139  07/17/18   0259  07/17/18   0413  07/17/18   0455  07/17/18   0603  07/17/18   0642   POCTGLUCOSE  119*  124*  116*  121*  113*  80  98  96  124*  113*       Current Medications and/or Treatments Impacting Glycemic Control  Immunotherapy:  Immunosuppressants     None        Steroids:   Hormones     Start     Stop Route Frequency Ordered    07/17/18 0900  predniSONE tablet 30 mg      -- Oral Daily 07/16/18 1815        Pressors:    Autonomic Drugs     Start     Stop Route Frequency Ordered    07/16/18 1830  pyridostigmine tablet 60 mg      -- Oral 4 times daily 07/16/18 1816        Hyperglycemia/Diabetes Medications: " Antihyperglycemics     Start     Stop Route Frequency Ordered    07/16/18 1215  insulin regular (Humulin R) 100 Units in sodium chloride 0.9% 100 mL infusion      -- IV Continuous 07/16/18 1120

## 2018-07-17 NOTE — OP NOTE
DATE OF PROCEDURE:  07/16/2018    SURGEON:   Yanni Ward M.D. (RES).    PRIMARY SURGEON:  Ricky Hernandez M.D.    PREOPERATIVE DIAGNOSIS:  Myasthenia gravis.    POSTOPERATIVE DIAGNOSIS:  Myasthenia gravis.    PROCEDURE:  Exploratory robotic thoracoscopy, median sternotomy and thymectomy.    ANESTHESIA:  General.    ESTIMATED BLOOD LOSS:  50 mL.    COMPLICATIONS:  None.    IMPLANTS:  A 24-[place] Baldomero drain in the mediastinum.    SPECIMENS:  Thymus, sent to Pathology for permanent.    ANESTHESIA:  General endotracheal anesthesia with a double-lumen endotracheal   tube.    INDICATION FOR PROCEDURE:  Mr. Candelario is a 67-year-old patient with myasthenia   gravis, referred to Thoracic Surgery for an elective thymectomy for curative   intent of his myasthenia gravis.  We did recommend proceeding with attempted   robotic surgery with possible median sternotomy for removal of the thymus.  He   did agree to proceed.    PROCEDURE IN DETAIL:  After informed consent was obtained, the patient was taken   to the operating room, placed supine on the operating table.  General   endotracheal anesthesia was induced with a double-lumen endotracheal tube, which   was confirmed to be in the correct position by Anesthesia via bronchoscopy.    The patient was appropriately positioned and prepped and draped in the usual   sterile fashion.  A timeout was called prior to beginning of the procedure and   antibiotics were confirmed prior to incision.  We began the procedure by   entering the chest via the fifth interspace in the anterior axillary line on   single lung ventilation on the right chest.  We inserted our 8-mm trocar and the   robotic camera was inserted through this.  The chest cavity was explored.  A   second 8 mm trocar was placed in the midclavicular line, 7th interspace under   direct vision.  Upon exploration of the chest, we did determine that the anatomy   was not amenable to proceeding robotically and did decide at that  time to   convert to an open median sternotomy.  The trocars were removed.  An incision   was made in the midline from the sternal notch to the xiphoid process with a #10   blade.  Dissection was taken down with Bovie cautery to the sternum.  While   anesthesia held ventilation, we did open the sternum with the sternal saw.    Anesthesia resumed ventilation at this time. We obtained hemostasis from the cut   sternal edge with a combination of Bovie cautery and bone wax.  Adhesions were   taken down with cautery from the chest wall to facilitate placement of the   sternal retractor.  We began mobilizing the right and left cervical horns of the   thymus from the innominate vein.  We did clip the crossing veins as we came   across them.  We then began mobilizing the thymic fat pad off of the pericardium   in a right to left fashion using a combination of Bovie cautery and the   LigaSure device.  Care was taken not to violate the pericardium.  The bilateral   parietal pleura were opened widely to facilitate this dissection.  Both   bilateral phrenic nerves were identified and preserved during this dissection.    Once completely mobilized, the specimen was sent for permanent to Pathology.  We   examined the chest cavity and had excellent hemostasis.  We did leave a single   24-Maltese Baldomero drain tube in the mediastinum for drainage.  The sternum was   then closed with sternal wires utilizing additional 2 figure-of-eight sutures in   the midline.  The skin was then closed in a layered fashion with 2 layers of   running #0 Vicryl followed by a single layer of running 3-0 Vicryl.  All skin   incisions were infiltrated with Exparel.  We closed the 2 additional robotic   trocar sites with interrupted Monocryl suture.  A sterile dressing was applied.    The patient was awoken in the operating room and taken to the PACU in good   condition.  He did tolerate this procedure well and had no immediate   complications.    Mary was present and scrubbed for the entirety of the   case.    DICTATED BY:  Yanni Ward M.D. (RES).      BO/DINO  dd: 07/16/2018 23:09:35 (CDT)  td: 07/16/2018 23:52:58 (CDT)  Doc ID   #[id number]  Job ID #[id number]    CC:

## 2018-07-17 NOTE — SUBJECTIVE & OBJECTIVE
Interval History: NAEON. Pain well controlled with PCA. Tolerating clears. Comfortable on 2LNC.     Medications:  Continuous Infusions:  Scheduled Meds:   aspirin  81 mg Oral Daily    atorvastatin  80 mg Oral QHS    ceFAZolin (ANCEF) IVPB  2 g Intravenous Q8H    enoxaparin  40 mg Subcutaneous Daily    pantoprazole  40 mg Oral Daily    polyethylene glycol  17 g Oral Daily    predniSONE  30 mg Oral Daily    pyridostigmine  60 mg Oral QID    ramelteon  8 mg Oral QHS    senna-docusate 8.6-50 mg  2 tablet Oral Daily     PRN Meds:dextrose 50%, dextrose 50%, dextrose 50%, dextrose 50%, glucagon (human recombinant), glucose, glucose, HYDROmorphone, insulin aspart U-100, naloxone, ondansetron, oxyCODONE, oxyCODONE, promethazine     Review of patient's allergies indicates:   Allergen Reactions    Demerol [meperidine] Anxiety     Hyper/ aggitation     Objective:     Vital Signs (Most Recent):  Temp: 98.4 °F (36.9 °C) (07/17/18 0725)  Pulse: 66 (07/17/18 0725)  Resp: 20 (07/17/18 0725)  BP: (!) 142/65 (07/17/18 0725)  SpO2: (!) 92 % (07/17/18 0900) Vital Signs (24h Range):  Temp:  [97.7 °F (36.5 °C)-98.9 °F (37.2 °C)] 98.4 °F (36.9 °C)  Pulse:  [60-72] 66  Resp:  [12-22] 20  SpO2:  [92 %-100 %] 92 %  BP: (103-164)/(57-95) 142/65  Arterial Line BP: (113-159)/(56-94) 142/70     Intake/Output - Last 3 Shifts       07/15 0700 - 07/16 0659 07/16 0700 - 07/17 0659 07/17 0700 - 07/18 0659    P.O.  840 240    I.V. (mL/kg)  2640.7 (25.5) 412.6 (4)    Total Intake(mL/kg)  3480.7 (33.7) 652.6 (6.3)    Urine (mL/kg/hr)  1225 (0.5)     Drains  550 (0.2)     Total Output   1775      Net   +1705.7 +652.6                 SpO2: (!) 92 %  O2 Device (Oxygen Therapy): room air    Physical Exam   Constitutional: He appears well-developed and well-nourished.   HENT:   Mouth/Throat: Oropharynx is clear and moist.   Eyes: Pupils are equal, round, and reactive to light.   Neck: Normal range of motion. Neck supple.   Cardiovascular: Normal  rate, regular rhythm and intact distal pulses.    Pulmonary/Chest: Effort normal. No accessory muscle usage. He has decreased breath sounds in the right lower field and the left lower field. He has no wheezes.   Abdominal: Soft. Bowel sounds are normal. He exhibits no distension.   Musculoskeletal: He exhibits no edema.   Lymphadenopathy:     He has no cervical adenopathy.   Neurological: He is alert.   Skin: He is diaphoretic.   Psychiatric: He has a normal mood and affect.       Significant Labs:  BMP:   Recent Labs  Lab 07/17/18  0701   *      K 4.2      CO2 28   BUN 13   CREATININE 0.8   CALCIUM 9.4   MG 2.0     CBC:   Recent Labs  Lab 07/17/18  0700   WBC 12.05   RBC 4.30*   HGB 12.8*   HCT 40.3      MCV 94   MCH 29.8   MCHC 31.8*     CMP:   Recent Labs  Lab 07/17/18  0701   *   CALCIUM 9.4      K 4.2   CO2 28      BUN 13   CREATININE 0.8       Significant Diagnostics:  CXR: I have reviewed all pertinent results/findings within the past 24 hours    VTE Risk Mitigation         Ordered     enoxaparin injection 40 mg  Daily      07/16/18 1124     IP VTE HIGH RISK PATIENT  Once      07/16/18 1124     Place sequential compression device  Until discontinued      07/16/18 1124

## 2018-07-17 NOTE — PLAN OF CARE
Problem: Physical Therapy Goal  Goal: Physical Therapy Goal  Goals to be met by: 2018     Patient will increase functional independence with mobility by performin. Supine to sit with Stand-by Assistance  2. Sit to supine with Stand-by Assistance  3. Sit to stand transfer with Stand-by Assistance  4. Bed to chair transfer with Stand-by Assistance without AD  5. Gait  x 200 feet with Stand-by Assistance with/without AD(for balance only; no pressing down with UEs).   6. Lower extremity exercise program x15 reps per handout, with supervision    Outcome: Ongoing (interventions implemented as appropriate)  Goals set

## 2018-07-17 NOTE — PLAN OF CARE
Problem: Patient Care Overview  Goal: Plan of Care Review  Outcome: Ongoing (interventions implemented as appropriate)  Plan of care reviewed with the pt, pt verbalized understanding.  Pt is AAOx4, VSS.  Pt on tele, NSR.  Pt on 2L NC, sats>92%.  Midline chest dressing with dried drainage, area marked.  Mediastinal tube to water seal, dressing C/D/I.  Pt with decreased appetite, tolerating sips of clear liquids.  Pt up with one assist, OOBTC during shift, free of falls and injuries.  Blood sugar monitored before meals.  Pt verbalizes relief of pain and nausea with PRN meds.  Pt denies chest pain or SOB.  Bed in low position, call light in reach, nonskid socks on, WCTM.

## 2018-07-17 NOTE — OP NOTE
DATE OF PROCEDURE:  07/16/2018.    PREOPERATIVE DIAGNOSIS:  Myasthenia gravis.    POSTOPERATIVE DIAGNOSIS:  Myasthenia gravis.    PROCEDURE:  Robotic exploration with conversion to median sternotomy and   complete thymectomy.    SURGEON:  Ricky Hernandez M.D.    FIRST ASSISTANT:  Yanni Ward M.D. (RES).    SECOND ASSISTANT:  Aileen Dan, certified physician's assistant.    ESTIMATED BLOOD LOSS:  50 mL.    INDICATIONS FOR PROCEDURE:  Mr. Candelario is a 67-year-old who has a history of   myasthenia gravis.  It was felt that the patient should undergo a complete   thymectomy to aid with symptom control.    PROCEDURE IN DETAIL:  The patient was taken to the Operating Room and placed   supine on the operating table.  The patient was identified.  IV antibiotics were   given.  The patient was intubated with a double-lumen tube.  He was maintained   supine with a shoulder roll placed longitudinally beneath his right chest.  The   patient's chest was prepped and draped in a sterile fashion.  A timeout   procedure was performed.  An 8 mm incision was made entering the chest in the   fifth interspace anterior axillary line.  Intrathoracic port placement was   confirmed and carbon dioxide insufflation administered.  A robotic scope was   inserted.  There was a substantial amount of fatty tissue within the anterior   mediastinum.  I could not visualize the superior vena cava nor could I identify   the phrenic pedicle.  A second 8 mm incision was made along the lateral   clavicular line seventh interspace.  Through these port sites, I divided a small   area of adhesion formation to allow the right upper lobe to fall away from the   mediastinum.  I performed a thorough exploration and felt strongly that a   robotic approach was not advisable due to the intense amount of fatty tissue and   observation that the anterior mediastinal structures were completely obscured   by the fatty tissue.  Accordingly, the robotic approach  was aborted.  I then   made a median sternotomy incision in a standard fashion.  A sternal retractor   was placed.  The retrosternal soft tissues were divided.  I then performed a   complete thymectomy by mobilizing the right and left cervical horns and dividing   the thymic vein completely,  the thymic tissue from the innominate   vein.  Despite the open approach, it was still extremely difficult to identify   the right phrenic pedicle.  I eventually had to begin my dissection inferiorly   near the cardiophrenic angle and identified an area of pericardium that was   relatively fat-free.  At this level, I identified the phrenic pedicle and was   able to use careful monopolar dissection to score the pericardium.  I then used   a LigaSure Impact to mobilize the fatty thymic tissue off of the underlying   pericardium.  I did so such that the pericardium was skeletonized.  I then   opened the left pleural space.  Similar to the right side, due to the extensive   amount of fatty tissue in the patient's mediastinum, I had great difficulty   identifying the left phrenic pedicle.  I again began my dissection inferiorly   along the pericardium and dissected in a cephalad fashion to complete the   thymectomy.  The thymectomy specimen was passed off of the operative field.  I   then made a stab incision along the inferior aspect of the wound and inserted a   24-Ind[place]ian Baldomero drain.  It was secured using a 0 silk suture.  After assuring   hemostasis, the sternal edges were reapproximated in the standard fashion using   heavy stainless steel.  The sternal edges were completely reapproximated and the   sternum was stable to palpation.  I then closed the soft tissues and skin in   three layers using a combination of 0 and 3-0 Vicryl suture.  Steri-Strips and a   sterile dressing were applied.  The two robotic ports were also closed in two   layers using a 3-0 Vicryl suture.    I, Ricky Hernandez, attest that I was  present for and scrubbed for the entire   procedure.  Furthermore, I performed the critical and key portions of the   procedure as surgeon.          /giovanni [id number] blank(s)        REYNALDO/DINO  dd: 07/16/2018 17:39:04 (CDT)  td: 07/16/2018 18:50:58 (CDT)  Doc ID   #[id number]  Job ID #[id number]    CC:

## 2018-07-17 NOTE — PLAN OF CARE
Problem: Patient Care Overview  Goal: Plan of Care Review    Recommendations    Recommendation/Intervention:     1. Continue cardiac diet.   2. Add boost glucose control with meals per pt request - pt with poor po intake.   3. Encourage po intake   4. RD following.     Goals: meet >50% EEN/EPN  Nutrition Goal Status: new

## 2018-07-17 NOTE — PLAN OF CARE
Problem: Patient Care Overview  Goal: Plan of Care Review  Outcome: Ongoing (interventions implemented as appropriate)  AAOx4. Drowsy at times but arouses easily. C/o pain that was relieved with pca pain pump dilaudid. Repositioned as needed. Sternal precautions in place. Encouraged patient to use pillow for splinting chest incision. Strong productive cough. Pulls 750 - 1000 on IS. On 2L NC. Chest tube output for shift 440 ml. Voids per urinal UOP for shift 1025 ml. SCD's on. Insulin gtt stopped last CBG was 96. Will continue to monitor.   Temp:  [97.9 °F (36.6 °C)-98.7 °F (37.1 °C)]   Pulse:  [60-72]   Resp:  [15-18]   BP: (159-164)/(70-95)   SpO2:  [93 %-100 %]   Arterial Line BP: (140-159)/(63-94)   I/O this shift:  In: 493.2 [P.O.:480; I.V.:13.2]  Out: 1465 [Urine:1025; Drains:440]    Patient progressing towards goals as tolerated, plan of care communicated and reviewed with Otoniel Candelario. All concerns addressed. Will continue to monitor.

## 2018-07-17 NOTE — ASSESSMENT & PLAN NOTE
Outpatient on orals only.   A1c pending    Post cardiac surgery -140 for first 24h.    Pt's BG levels were well controlled overnight off the insulin drip.  Will likely have minimal requirements given home regimen.  Discontinue insulin drip.  Will start low dose correction.  Will also schedule prandial insulin once diet advanced.    Discharge:  TBD.

## 2018-07-17 NOTE — PROGRESS NOTES
Plan of care reviewed with patient, verbalizes understanding. Patient now AAOX4, VSS. Sternal incision with Telfa CDI,  Patient had episode of confusion and restlessness, SUZY, MD to bedside to assess patient, patient currently AAOx4, VSS, wife at bedside. Chest tube to wall suction at 20cm, output recorded(110). Patient states no other needs at this time,c all light in reach, WCTM.

## 2018-07-17 NOTE — HPI
Patient is a 67 y.o. male presents with HTN, DM, HLD, MG (Ach-R ab +) with MG in March 2017 here today for evaluation of thymectomy. Experiences generalized weakness, fatigue, ptosis, slurred speech, and choking on liquids. Symptoms largely controlled with mestinon, prednisone and IVIG. Also started on Imuran 50mg daily by neurologist. Current regimen is Prednisone 30mg, Mestinon 60mg BID-TID, and IVIG b2gottf. Last IVIG 1 week ago. ecreased muscle fatigue, has been working outside in the yard. He started following with cardiologist for SOB/BETHEA prior to MG diagnosis. Also reports mild orthopnea. He believes he has had a stress test in the past. Denies prior myasthenia crisis. Never been intubated for MG. Denies blood thinners. Denies fever, chills, CP, baseline SOB, appetite or weight changes.      Smokes 1 cigar daily. Occasional ETOH.   Appendectomy, bilateral inguinal hernia repairs, vasectomy  Retired mail .

## 2018-07-17 NOTE — PT/OT/SLP EVAL
Physical Therapy Evaluation    Patient Name:  Otoniel Candelario   MRN:  [id number]    Recommendations:     Discharge Recommendations:  home health PT   Discharge Equipment Recommendations: none   Barriers to discharge: None    Assessment:     Otoniel Candelario is a 67 y.o. male admitted with a medical diagnosis of Myasthenia gravis.  He presents with the following impairments/functional limitations:  weakness, impaired endurance, impaired functional mobilty, gait instability, impaired balance, pain Pt. cooperative and tolerated treatment fairly well except for sternal discomfort, dizziness upon initial sitting, and nausea/dry heaving.    Rehab Prognosis:  good; patient would benefit from acute skilled PT services to address these deficits and reach maximum level of function.      Recent Surgery: Procedure(s) (LRB):  STERNOTOMY median, thymectomy (N/A)  ROBOTIC LAPAROSCOPY, EXPLORATORY (N/A) 1 Day Post-Op    Plan:     During this hospitalization, patient to be seen 4 x/week to address the above listed problems via gait training, therapeutic activities, therapeutic exercises  · Plan of Care Expires:  08/16/18   Plan of Care Reviewed with:      Subjective     Communicated with nursing prior to session.  Patient found supine upon PT entry to room, agreeable to evaluation.      Chief Complaint: sternal discomfort, dizziness upon initial sitting, and nausea/dry heaving  Patient comments/goals: to move better  Pain/Comfort:  · Pain Rating 1:  (pt. did not rate)  · Location - Orientation 1: generalized  · Location 1: sternal  · Pain Addressed 1: Pre-medicate for activity, Reposition, Cessation of Activity    Patients cultural, spiritual, Anabaptist conflicts given the current situation: no    Living Environment:  Pt. Lives with spouse and elderly parent in [facility] with ramp access.  Prior to admission, patients level of function was indep.  Patient has the following equipment: bedside commode, bath bench, shower chair, wheelchair,  walker, standard, cane, straight.  Upon discharge, patient will have assistance from spouse.    Objective:     Patient found with: arterial line, chest tube, oxygen, peripheral IV, pulse ox (continuous), SCD, telemetry     General Precautions: Standard, fall, sternal   Orthopedic Precautions:N/A   Braces:       Exams:  · RUE ROM: WFL except sternal precautions  · RUE Strength: WFL except sternal precautions  · LUE ROM: WFL except sternal precautions  · LUE Strength: WFL except sternal precautions  · RLE ROM: WFL  · RLE Strength: WFL  · LLE ROM: WFL  · LLE Strength: WFL    Functional Mobility:  · Bed Mobility:     · Rolling Left:  minimum assistance  · Scooting: minimum assistance  · Supine to Sit: minimum assistance  · Transfers:     · Sit to Stand:  minimum assistance with hand-held assist  · Gait: 30' with CGA-Min A with HHA. Pt. amb. with decreased stride length/vickie  · Balance: fair-    AM-PAC 6 CLICK MOBILITY  Total Score:18       Therapeutic Activities and Exercises:   Discussed sternal precautions, goals, and POC.    Patient left up in chair with all lines intact and call button in reach.    GOALS:    Physical Therapy Goals        Problem: Physical Therapy Goal    Goal Priority Disciplines Outcome Goal Variances Interventions   Physical Therapy Goal     PT/OT, PT Ongoing (interventions implemented as appropriate)     Description:  Goals to be met by: 2018     Patient will increase functional independence with mobility by performin. Supine to sit with Stand-by Assistance  2. Sit to supine with Stand-by Assistance  3. Sit to stand transfer with Stand-by Assistance  4. Bed to chair transfer with Stand-by Assistance without AD  5. Gait  x 200 feet with Stand-by Assistance with/without AD(for balance only; no pressing down with UEs).   6. Lower extremity exercise program x15 reps per handout, with supervision                      History:     Past Medical History:   Diagnosis Date    Diabetes  mellitus     High cholesterol     Hypertension     Myasthenia gravis 03/2017       Past Surgical History:   Procedure Laterality Date    APPENDECTOMY      HERNIA REPAIR         Clinical Decision Making:     History  Co-morbidities and personal factors that may impact the plan of care Examination  Body Structures and Functions, activity limitations and participation restrictions that may impact the plan of care Clinical Presentation   Decision Making/ Complexity Score   Co-morbidities:   [] Time since onset of injury / illness / exacerbation  [] Status of current condition  []Patient's cognitive status and safety concerns    [] Multiple Medical Problems (see med hx)  Personal Factors:   [] Patient's age  [] Prior Level of function   [] Patient's home situation (environment and family support)  [] Patient's level of motivation  [] Expected progression of patient      HISTORY:(criteria)    [] 89936 - no personal factors/history    [] 38774 - has 1-2 personal factor/comorbidity     [] 26321 - has >3 personal factor/comorbidity     Body Regions:  [] Objective examination findings  [] Head     []  Neck  [] Trunk   [] Upper Extremity  [] Lower Extremity    Body Systems:  [] For communication ability, affect, cognition, language, and learning style: the assessment of the ability to make needs known, consciousness, orientation (person, place, and time), expected emotional /behavioral responses, and learning preferences (eg, learning barriers, education  needs)  [] For the neuromuscular system: a general assessment of gross coordinated movement (eg, balance, gait, locomotion, transfers, and transitions) and motor function  (motor control and motor learning)  [] For the musculoskeletal system: the assessment of gross symmetry, gross range of motion, gross strength, height, and weight  [] For the integumentary system: the assessment of pliability(texture), presence of scar formation, skin color, and skin integrity  [] For  cardiovascular/pulmonary system: the assessment of heart rate, respiratory rate, blood pressure, and edema     Activity limitations:    [] Patient's cognitive status and saf ety concerns          [] Status of current condition      [] Weight bearing restriction  [] Cardiopulmunary Restriction    Participation Restrictions:   [] Goals and goal agreement with the patient     [] Rehab potential (prognosis) and probable outcome      Examination of Body System: (criteria)    [] 91787 - addressing 1-2 elements    [] 04026 - addressing a total of 3 or more elements     [] 76438 -  Addressing a total of 4 or more elements         Clinical Presentation: (criteria)  Choose one     On examination of body system using standardized tests and measures patient presents with (CHOOSE ONE) elements from any of the following: body structures and functions, activity limitations, and/or participation restrictions.  Leading to a clinical presentation that is considered (CHOOSE ONE)                              Clinical Decision Making  (Eval Complexity):  Choose One     Time Tracking:     PT Received On: 07/17/18  PT Start Time: 1107     PT Stop Time: 1139  PT Total Time (min): 32 min     Billable Minutes: Evaluation 22 and Therapeutic Activity 10      Joaquim Ruiz, PT  07/17/2018

## 2018-07-17 NOTE — PROGRESS NOTES
[facility]  Thoracic Surgery  Progress Note    Subjective:     History of Present Illness:  Patient is a 67 y.o. male presents with HTN, DM, HLD, MG (Ach-R ab +) with MG in March 2017 here today for evaluation of thymectomy. Experiences generalized weakness, fatigue, ptosis, slurred speech, and choking on liquids. Symptoms largely controlled with mestinon, prednisone and IVIG. Also started on Imuran 50mg daily by neurologist. Current regimen is Prednisone 30mg, Mestinon 60mg BID-TID, and IVIG e2borvm. Last IVIG 1 week ago. ecreased muscle fatigue, has been working outside in the yard. He started following with cardiologist for SOB/BETHEA prior to MG diagnosis. Also reports mild orthopnea. He believes he has had a stress test in the past. Denies prior myasthenia crisis. Never been intubated for MG. Denies blood thinners. Denies fever, chills, CP, baseline SOB, appetite or weight changes.      Smokes 1 cigar daily. Occasional ETOH.   Appendectomy, bilateral inguinal hernia repairs, vasectomy  Retired mail .     Post-Op Info:  Procedure(s) (LRB):  STERNOTOMY median, thymectomy (N/A)  ROBOTIC LAPAROSCOPY, EXPLORATORY (N/A)   1 Day Post-Op     Interval History: NAEON. Pain well controlled with PCA. Tolerating clears. Comfortable on 2LNC.     Medications:  Continuous Infusions:  Scheduled Meds:   aspirin  81 mg Oral Daily    atorvastatin  80 mg Oral QHS    ceFAZolin (ANCEF) IVPB  2 g Intravenous Q8H    enoxaparin  40 mg Subcutaneous Daily    pantoprazole  40 mg Oral Daily    polyethylene glycol  17 g Oral Daily    predniSONE  30 mg Oral Daily    pyridostigmine  60 mg Oral QID    ramelteon  8 mg Oral QHS    senna-docusate 8.6-50 mg  2 tablet Oral Daily     PRN Meds:dextrose 50%, dextrose 50%, dextrose 50%, dextrose 50%, glucagon (human recombinant), glucose, glucose, HYDROmorphone, insulin aspart U-100, naloxone, ondansetron, oxyCODONE, oxyCODONE, promethazine     Review of patient's  allergies indicates:   Allergen Reactions    Demerol [meperidine] Anxiety     Hyper/ aggitation     Objective:     Vital Signs (Most Recent):  Temp: 98.4 °F (36.9 °C) (07/17/18 0725)  Pulse: 66 (07/17/18 0725)  Resp: 20 (07/17/18 0725)  BP: (!) 142/65 (07/17/18 0725)  SpO2: (!) 92 % (07/17/18 0900) Vital Signs (24h Range):  Temp:  [97.7 °F (36.5 °C)-98.9 °F (37.2 °C)] 98.4 °F (36.9 °C)  Pulse:  [60-72] 66  Resp:  [12-22] 20  SpO2:  [92 %-100 %] 92 %  BP: (103-164)/(57-95) 142/65  Arterial Line BP: (113-159)/(56-94) 142/70     Intake/Output - Last 3 Shifts       07/15 0700 - 07/16 0659 07/16 0700 - 07/17 0659 07/17 0700 - 07/18 0659    P.O.  840 240    I.V. (mL/kg)  2640.7 (25.5) 412.6 (4)    Total Intake(mL/kg)  3480.7 (33.7) 652.6 (6.3)    Urine (mL/kg/hr)  1225 (0.5)     Drains  550 (0.2)     Total Output   1775      Net   +1705.7 +652.6                 SpO2: (!) 92 %  O2 Device (Oxygen Therapy): room air    Physical Exam   Constitutional: He appears well-developed and well-nourished.   HENT:   Mouth/Throat: Oropharynx is clear and moist.   Eyes: Pupils are equal, round, and reactive to light.   Neck: Normal range of motion. Neck supple.   Cardiovascular: Normal rate, regular rhythm and intact distal pulses.    Pulmonary/Chest: Effort normal. No accessory muscle usage. He has decreased breath sounds in the right lower field and the left lower field. He has no wheezes.   Abdominal: Soft. Bowel sounds are normal. He exhibits no distension.   Musculoskeletal: He exhibits no edema.   Lymphadenopathy:     He has no cervical adenopathy.   Neurological: He is alert.   Skin: He is diaphoretic.   Psychiatric: He has a normal mood and affect.       Significant Labs:  BMP:   Recent Labs  Lab 07/17/18  0701   *      K 4.2      CO2 28   BUN 13   CREATININE 0.8   CALCIUM 9.4   MG 2.0     CBC:   Recent Labs  Lab 07/17/18  0700   WBC 12.05   RBC 4.30*   HGB 12.8*   HCT 40.3      MCV 94   MCH 29.8   MCHC  31.8*     CMP:   Recent Labs  Lab 07/17/18  0701   *   CALCIUM 9.4      K 4.2   CO2 28      BUN 13   CREATININE 0.8       Significant Diagnostics:  CXR: I have reviewed all pertinent results/findings within the past 24 hours    VTE Risk Mitigation         Ordered     enoxaparin injection 40 mg  Daily      07/16/18 1124     IP VTE HIGH RISK PATIENT  Once      07/16/18 1124     Place sequential compression device  Until discontinued      07/16/18 1124        Assessment/Plan:     * Myasthenia gravis    67 year old male POD1 s/p median sternotomy for thymectomy    - Chest tube to water seal   - DC milan  - DC PCA. Start PO PRN narcotics  - Wean NC O2 for saturations greater than 88%  - PT/OT with sternal prevautions   - IS/Duo Nebs   - Home myasthenia meds restarted          Current chronic use of systemic steroids    Restart home steroids.         Type 2 diabetes mellitus    Insulin gtt off. SSI with low dose correction. Appreciate Endocrine recs.             JG Steiner  Thoracic Surgery  [facility]

## 2018-07-18 PROBLEM — R06.02 SOB (SHORTNESS OF BREATH): Status: RESOLVED | Noted: 2018-04-12 | Resolved: 2018-07-18

## 2018-07-18 LAB
ANION GAP SERPL CALC-SCNC: 9 MMOL/L
BASOPHILS # BLD AUTO: 0.02 K/UL
BASOPHILS NFR BLD: 0.1 %
BLD PROD TYP BPU: NORMAL
BLD PROD TYP BPU: NORMAL
BLOOD UNIT EXPIRATION DATE: NORMAL
BLOOD UNIT EXPIRATION DATE: NORMAL
BLOOD UNIT TYPE CODE: 5100
BLOOD UNIT TYPE CODE: 5100
BLOOD UNIT TYPE: NORMAL
BLOOD UNIT TYPE: NORMAL
BUN SERPL-MCNC: 12 MG/DL
CALCIUM SERPL-MCNC: 9.5 MG/DL
CHLORIDE SERPL-SCNC: 92 MMOL/L
CO2 SERPL-SCNC: 32 MMOL/L
CODING SYSTEM: NORMAL
CODING SYSTEM: NORMAL
CREAT SERPL-MCNC: 0.9 MG/DL
DIFFERENTIAL METHOD: ABNORMAL
DISPENSE STATUS: NORMAL
DISPENSE STATUS: NORMAL
EOSINOPHIL # BLD AUTO: 0 K/UL
EOSINOPHIL NFR BLD: 0.1 %
ERYTHROCYTE [DISTWIDTH] IN BLOOD BY AUTOMATED COUNT: 14 %
EST. GFR  (AFRICAN AMERICAN): >60 ML/MIN/1.73 M^2
EST. GFR  (NON AFRICAN AMERICAN): >60 ML/MIN/1.73 M^2
GLUCOSE SERPL-MCNC: 191 MG/DL
HCT VFR BLD AUTO: 39.8 %
HGB BLD-MCNC: 12.8 G/DL
IMM GRANULOCYTES # BLD AUTO: 0.04 K/UL
IMM GRANULOCYTES NFR BLD AUTO: 0.3 %
LYMPHOCYTES # BLD AUTO: 1.9 K/UL
LYMPHOCYTES NFR BLD: 12.6 %
MAGNESIUM SERPL-MCNC: 2 MG/DL
MCH RBC QN AUTO: 29.8 PG
MCHC RBC AUTO-ENTMCNC: 32.2 G/DL
MCV RBC AUTO: 93 FL
MONOCYTES # BLD AUTO: 1.3 K/UL
MONOCYTES NFR BLD: 8.4 %
NEUTROPHILS # BLD AUTO: 11.7 K/UL
NEUTROPHILS NFR BLD: 78.5 %
NRBC BLD-RTO: 0 /100 WBC
NUM UNITS TRANS PACKED RBC: NORMAL
NUM UNITS TRANS PACKED RBC: NORMAL
PHOSPHATE SERPL-MCNC: 3.2 MG/DL
PLATELET # BLD AUTO: 231 K/UL
PMV BLD AUTO: 9.9 FL
POCT GLUCOSE: 193 MG/DL (ref 70–110)
POCT GLUCOSE: 193 MG/DL (ref 70–110)
POCT GLUCOSE: 244 MG/DL (ref 70–110)
POCT GLUCOSE: 267 MG/DL (ref 70–110)
POTASSIUM SERPL-SCNC: 5.1 MMOL/L
RBC # BLD AUTO: 4.29 M/UL
SODIUM SERPL-SCNC: 133 MMOL/L
WBC # BLD AUTO: 14.84 K/UL

## 2018-07-18 PROCEDURE — 85025 COMPLETE CBC W/AUTO DIFF WBC: CPT

## 2018-07-18 PROCEDURE — 63600175 PHARM REV CODE 636 W HCPCS: Performed by: STUDENT IN AN ORGANIZED HEALTH CARE EDUCATION/TRAINING PROGRAM

## 2018-07-18 PROCEDURE — 97110 THERAPEUTIC EXERCISES: CPT

## 2018-07-18 PROCEDURE — 25000003 PHARM REV CODE 250: Performed by: PHYSICIAN ASSISTANT

## 2018-07-18 PROCEDURE — G8987 SELF CARE CURRENT STATUS: HCPCS | Mod: CK

## 2018-07-18 PROCEDURE — 80048 BASIC METABOLIC PNL TOTAL CA: CPT

## 2018-07-18 PROCEDURE — G8988 SELF CARE GOAL STATUS: HCPCS | Mod: CI

## 2018-07-18 PROCEDURE — 97530 THERAPEUTIC ACTIVITIES: CPT

## 2018-07-18 PROCEDURE — 63600175 PHARM REV CODE 636 W HCPCS: Performed by: SURGERY

## 2018-07-18 PROCEDURE — 99232 SBSQ HOSP IP/OBS MODERATE 35: CPT | Mod: ,,, | Performed by: INTERNAL MEDICINE

## 2018-07-18 PROCEDURE — 36415 COLL VENOUS BLD VENIPUNCTURE: CPT

## 2018-07-18 PROCEDURE — 83735 ASSAY OF MAGNESIUM: CPT

## 2018-07-18 PROCEDURE — 84100 ASSAY OF PHOSPHORUS: CPT

## 2018-07-18 PROCEDURE — 20600001 HC STEP DOWN PRIVATE ROOM

## 2018-07-18 PROCEDURE — 97116 GAIT TRAINING THERAPY: CPT

## 2018-07-18 PROCEDURE — 25000003 PHARM REV CODE 250: Performed by: STUDENT IN AN ORGANIZED HEALTH CARE EDUCATION/TRAINING PROGRAM

## 2018-07-18 PROCEDURE — 25000003 PHARM REV CODE 250: Performed by: SURGERY

## 2018-07-18 PROCEDURE — 97165 OT EVAL LOW COMPLEX 30 MIN: CPT

## 2018-07-18 RX ORDER — SERTRALINE HYDROCHLORIDE 25 MG/1
25 TABLET, FILM COATED ORAL NIGHTLY
Status: DISCONTINUED | OUTPATIENT
Start: 2018-07-18 | End: 2018-07-19 | Stop reason: HOSPADM

## 2018-07-18 RX ORDER — ISOSORBIDE MONONITRATE 30 MG/1
30 TABLET, EXTENDED RELEASE ORAL EVERY MORNING
Status: DISCONTINUED | OUTPATIENT
Start: 2018-07-18 | End: 2018-07-19 | Stop reason: HOSPADM

## 2018-07-18 RX ORDER — CEPHALEXIN 500 MG/1
500 CAPSULE ORAL EVERY 6 HOURS
Status: DISCONTINUED | OUTPATIENT
Start: 2018-07-18 | End: 2018-07-19 | Stop reason: HOSPADM

## 2018-07-18 RX ORDER — INSULIN ASPART 100 [IU]/ML
3 INJECTION, SOLUTION INTRAVENOUS; SUBCUTANEOUS
Status: DISCONTINUED | OUTPATIENT
Start: 2018-07-18 | End: 2018-07-19 | Stop reason: HOSPADM

## 2018-07-18 RX ADMIN — Medication 1 MG: at 01:07

## 2018-07-18 RX ADMIN — ONDANSETRON 4 MG: 2 INJECTION INTRAMUSCULAR; INTRAVENOUS at 11:07

## 2018-07-18 RX ADMIN — PREDNISONE 30 MG: 20 TABLET ORAL at 08:07

## 2018-07-18 RX ADMIN — PANTOPRAZOLE SODIUM 40 MG: 40 TABLET, DELAYED RELEASE ORAL at 08:07

## 2018-07-18 RX ADMIN — ATORVASTATIN CALCIUM 80 MG: 20 TABLET, FILM COATED ORAL at 09:07

## 2018-07-18 RX ADMIN — SENNOSIDES AND DOCUSATE SODIUM 2 TABLET: 8.6; 5 TABLET ORAL at 08:07

## 2018-07-18 RX ADMIN — ENOXAPARIN SODIUM 40 MG: 100 INJECTION SUBCUTANEOUS at 06:07

## 2018-07-18 RX ADMIN — ISOSORBIDE MONONITRATE 30 MG: 30 TABLET, EXTENDED RELEASE ORAL at 10:07

## 2018-07-18 RX ADMIN — PYRIDOSTIGMINE BROMIDE 60 MG: 60 TABLET ORAL at 01:07

## 2018-07-18 RX ADMIN — INSULIN DETEMIR 5 UNITS: 100 INJECTION, SOLUTION SUBCUTANEOUS at 09:07

## 2018-07-18 RX ADMIN — PYRIDOSTIGMINE BROMIDE 60 MG: 60 TABLET ORAL at 09:07

## 2018-07-18 RX ADMIN — SERTRALINE HYDROCHLORIDE 25 MG: 25 TABLET ORAL at 09:07

## 2018-07-18 RX ADMIN — CEPHALEXIN 500 MG: 500 CAPSULE ORAL at 06:07

## 2018-07-18 RX ADMIN — Medication 1 MG: at 06:07

## 2018-07-18 RX ADMIN — INSULIN ASPART 3 UNITS: 100 INJECTION, SOLUTION INTRAVENOUS; SUBCUTANEOUS at 01:07

## 2018-07-18 RX ADMIN — PYRIDOSTIGMINE BROMIDE 60 MG: 60 TABLET ORAL at 08:07

## 2018-07-18 RX ADMIN — CEPHALEXIN 500 MG: 500 CAPSULE ORAL at 02:07

## 2018-07-18 RX ADMIN — CEPHALEXIN 500 MG: 500 CAPSULE ORAL at 11:07

## 2018-07-18 RX ADMIN — INSULIN ASPART 2 UNITS: 100 INJECTION, SOLUTION INTRAVENOUS; SUBCUTANEOUS at 06:07

## 2018-07-18 RX ADMIN — OXYCODONE HYDROCHLORIDE 10 MG: 5 TABLET ORAL at 09:07

## 2018-07-18 RX ADMIN — POLYETHYLENE GLYCOL 3350 17 G: 17 POWDER, FOR SOLUTION ORAL at 08:07

## 2018-07-18 RX ADMIN — ONDANSETRON 4 MG: 2 INJECTION INTRAMUSCULAR; INTRAVENOUS at 01:07

## 2018-07-18 RX ADMIN — ASPIRIN 81 MG: 81 TABLET, COATED ORAL at 08:07

## 2018-07-18 RX ADMIN — OXYCODONE HYDROCHLORIDE 10 MG: 5 TABLET ORAL at 10:07

## 2018-07-18 NOTE — ASSESSMENT & PLAN NOTE
Outpatient on orals only.   A1c pending    Currently on Prednisone 30 mg, daily.   BG could increase while on Prednisone.    Currently on low dose correction.  Would start weight based regimen:  Detemir 10 unit, hs  Aspart 3 units AC  POC AC/HS    Discharge:  TBD

## 2018-07-18 NOTE — SUBJECTIVE & OBJECTIVE
Interval History: NAEON. Up in chair today     Medications:  Continuous Infusions:  Scheduled Meds:   aspirin  81 mg Oral Daily    atorvastatin  80 mg Oral QHS    enoxaparin  40 mg Subcutaneous Daily    pantoprazole  40 mg Oral Daily    polyethylene glycol  17 g Oral Daily    predniSONE  30 mg Oral Daily    pyridostigmine  60 mg Oral QID    senna-docusate 8.6-50 mg  2 tablet Oral Daily     PRN Meds:dextrose 50%, dextrose 50%, dextrose 50%, dextrose 50%, glucagon (human recombinant), glucose, glucose, HYDROmorphone, insulin aspart U-100, naloxone, ondansetron, oxyCODONE, oxyCODONE, promethazine, ramelteon     Review of patient's allergies indicates:   Allergen Reactions    Demerol [meperidine] Anxiety     Hyper/ aggitation     Objective:     Vital Signs (Most Recent):  Temp: 98.8 °F (37.1 °C) (07/18/18 0803)  Pulse: 66 (07/18/18 0836)  Resp: 15 (07/18/18 0836)  BP: (!) 169/78 (07/18/18 0836)  SpO2: 98 % (07/18/18 0836) Vital Signs (24h Range):  Temp:  [98.1 °F (36.7 °C)-99.1 °F (37.3 °C)] 98.8 °F (37.1 °C)  Pulse:  [66-68] 66  Resp:  [14-20] 15  SpO2:  [96 %-99 %] 98 %  BP: (158-187)/(72-99) 169/78     Intake/Output - Last 3 Shifts       07/16 0700 - 07/17 0659 07/17 0700 - 07/18 0659 07/18 0700 - 07/19 0659    P.O. 840 600     I.V. (mL/kg) 2640.7 (25.5) 527.1 (5.1)     Total Intake(mL/kg) 3480.7 (33.7) 1127.1 (10.9)     Urine (mL/kg/hr) 1225 (0.5) 875 (0.4)     Drains 550 (0.2) 100 (0)     Total Output 1775 975      Net +1705.7 +152.1                   SpO2: 98 %  O2 Device (Oxygen Therapy): nasal cannula    Physical Exam   Constitutional: He appears well-developed and well-nourished.   Cardiovascular: Normal rate and regular rhythm.    Pulmonary/Chest: Effort normal. No accessory muscle usage.   Chest tube ss   Musculoskeletal: He exhibits no edema.   Neurological: He is alert.   Psychiatric: He has a normal mood and affect.       Significant Labs:  BMP:     Recent Labs  Lab 07/18/18  0420 07/18/18  0421    GLU  --  191*   NA  --  133*   K  --  5.1   CL  --  92*   CO2  --  32*   BUN  --  12   CREATININE  --  0.9   CALCIUM  --  9.5   MG 2.0  --      CBC:     Recent Labs  Lab 07/18/18  0420   WBC 14.84*   RBC 4.29*   HGB 12.8*   HCT 39.8*      MCV 93   MCH 29.8   MCHC 32.2     CMP:     Recent Labs  Lab 07/18/18  0421   *   CALCIUM 9.5   *   K 5.1   CO2 32*   CL 92*   BUN 12   CREATININE 0.9       Significant Diagnostics:  CXR: I have reviewed all pertinent results/findings within the past 24 hours    VTE Risk Mitigation         Ordered     enoxaparin injection 40 mg  Daily      07/16/18 1124     IP VTE HIGH RISK PATIENT  Once      07/16/18 1124     Place sequential compression device  Until discontinued      07/16/18 1124

## 2018-07-18 NOTE — PROGRESS NOTES
Pt ate dinner without notifying RN to check CBG.   at this time.  Pt states he finished eating about 30 min prior.  Will give sliding scale at this time.  Pt educated again on the importance of calling RN before eating meal.

## 2018-07-18 NOTE — PLAN OF CARE
Problem: Physical Therapy Goal  Goal: Physical Therapy Goal  Goals to be met by: 2018     Patient will increase functional independence with mobility by performin. Supine to sit with Stand-by Assistance  2. Sit to supine with Stand-by Assistance. - GOAL MET   REVISED: with S  3. Sit to stand transfer with CGA.  4. Bed to chair transfer with CGA without AD.  5. Gait  x 200 feet with CGA, without AD sec to sternal precautions.   6. Lower extremity exercise program x 20 reps per handout, with A PRN.      Outcome: Ongoing (interventions implemented as appropriate)  Pt achieved 1 goal, other goals modified to reflect CLOF.

## 2018-07-18 NOTE — PROGRESS NOTES
[facility]  Thoracic Surgery  Progress Note    Subjective:     History of Present Illness:  Patient is a 67 y.o. male presents with HTN, DM, HLD, MG (Ach-R ab +) with MG in March 2017 here today for evaluation of thymectomy. Experiences generalized weakness, fatigue, ptosis, slurred speech, and choking on liquids. Symptoms largely controlled with mestinon, prednisone and IVIG. Also started on Imuran 50mg daily by neurologist. Current regimen is Prednisone 30mg, Mestinon 60mg BID-TID, and IVIG k5vdiar. Last IVIG 1 week ago. ecreased muscle fatigue, has been working outside in the yard. He started following with cardiologist for SOB/BETHEA prior to MG diagnosis. Also reports mild orthopnea. He believes he has had a stress test in the past. Denies prior myasthenia crisis. Never been intubated for MG. Denies blood thinners. Denies fever, chills, CP, baseline SOB, appetite or weight changes.      Smokes 1 cigar daily. Occasional ETOH.   Appendectomy, bilateral inguinal hernia repairs, vasectomy  Retired mail .     Post-Op Info:  Procedure(s) (LRB):  STERNOTOMY median, thymectomy (N/A)  ROBOTIC LAPAROSCOPY, EXPLORATORY (N/A)   2 Days Post-Op     Interval History: NAEON. Up in chair today     Medications:  Continuous Infusions:  Scheduled Meds:   aspirin  81 mg Oral Daily    atorvastatin  80 mg Oral QHS    enoxaparin  40 mg Subcutaneous Daily    pantoprazole  40 mg Oral Daily    polyethylene glycol  17 g Oral Daily    predniSONE  30 mg Oral Daily    pyridostigmine  60 mg Oral QID    senna-docusate 8.6-50 mg  2 tablet Oral Daily     PRN Meds:dextrose 50%, dextrose 50%, dextrose 50%, dextrose 50%, glucagon (human recombinant), glucose, glucose, HYDROmorphone, insulin aspart U-100, naloxone, ondansetron, oxyCODONE, oxyCODONE, promethazine, ramelteon     Review of patient's allergies indicates:   Allergen Reactions    Demerol [meperidine] Anxiety     Hyper/ aggitation     Objective:      Vital Signs (Most Recent):  Temp: 98.8 °F (37.1 °C) (07/18/18 0803)  Pulse: 66 (07/18/18 0836)  Resp: 15 (07/18/18 0836)  BP: (!) 169/78 (07/18/18 0836)  SpO2: 98 % (07/18/18 0836) Vital Signs (24h Range):  Temp:  [98.1 °F (36.7 °C)-99.1 °F (37.3 °C)] 98.8 °F (37.1 °C)  Pulse:  [66-68] 66  Resp:  [14-20] 15  SpO2:  [96 %-99 %] 98 %  BP: (158-187)/(72-99) 169/78     Intake/Output - Last 3 Shifts       07/16 0700 - 07/17 0659 07/17 0700 - 07/18 0659 07/18 0700 - 07/19 0659    P.O. 840 600     I.V. (mL/kg) 2640.7 (25.5) 527.1 (5.1)     Total Intake(mL/kg) 3480.7 (33.7) 1127.1 (10.9)     Urine (mL/kg/hr) 1225 (0.5) 875 (0.4)     Drains 550 (0.2) 100 (0)     Total Output 1775 975      Net +1705.7 +152.1                   SpO2: 98 %  O2 Device (Oxygen Therapy): nasal cannula    Physical Exam   Constitutional: He appears well-developed and well-nourished.   Cardiovascular: Normal rate and regular rhythm.    Pulmonary/Chest: Effort normal. No accessory muscle usage.   Chest tube ss   Musculoskeletal: He exhibits no edema.   Neurological: He is alert.   Psychiatric: He has a normal mood and affect.       Significant Labs:  BMP:     Recent Labs  Lab 07/18/18 0420 07/18/18 0421   GLU  --  191*   NA  --  133*   K  --  5.1   CL  --  92*   CO2  --  32*   BUN  --  12   CREATININE  --  0.9   CALCIUM  --  9.5   MG 2.0  --      CBC:     Recent Labs  Lab 07/18/18 0420   WBC 14.84*   RBC 4.29*   HGB 12.8*   HCT 39.8*      MCV 93   MCH 29.8   MCHC 32.2     CMP:     Recent Labs  Lab 07/18/18  0421   *   CALCIUM 9.5   *   K 5.1   CO2 32*   CL 92*   BUN 12   CREATININE 0.9       Significant Diagnostics:  CXR: I have reviewed all pertinent results/findings within the past 24 hours    VTE Risk Mitigation         Ordered     enoxaparin injection 40 mg  Daily      07/16/18 1124     IP VTE HIGH RISK PATIENT  Once      07/16/18 1124     Place sequential compression device  Until discontinued      07/16/18 1124         Assessment/Plan:     * Myasthenia gravis    67 y.o. male 2 Days Post-Op for Procedure(s) (LRB):  STERNOTOMY median, thymectomy (N/A)  ROBOTIC LAPAROSCOPY, EXPLORATORY (N/A)      - Chest tube out  - Start PO PRN narcotics  - Wean NC O2 for saturations greater than 88%  - PT/OT with sternal precautions   - IS/Duo Nebs   - Home myasthenia meds restarted          Current chronic use of systemic steroids    Restart home steroids.         Type 2 diabetes mellitus    Insulin gtt off. SSI with low dose correction. Appreciate Endocrine recs.             Yanni Ward MD  Thoracic Surgery  [facility]

## 2018-07-18 NOTE — PROGRESS NOTES
Patient SBP in the 160s. On call MD notified and ordered to call if SBP equal to or greater than 180. NO acute distress at this time. Will continue to monitor.

## 2018-07-18 NOTE — ASSESSMENT & PLAN NOTE
67 y.o. male 2 Days Post-Op for Procedure(s) (LRB):  STERNOTOMY median, thymectomy (N/A)  ROBOTIC LAPAROSCOPY, EXPLORATORY (N/A)      - Chest tube out  - Start PO PRN narcotics  - Wean NC O2 for saturations greater than 88%  - PT/OT with sternal precautions   - IS/Duo Nebs   - Home myasthenia meds restarted

## 2018-07-18 NOTE — PROGRESS NOTES
"[facility]  Endocrinology  Progress Note    Admit Date: 2018     Reason for Consult: Management of T2DM, Hyperglycemia     Surgical Procedure and Date: Thymectomy 18    Home Diabetes Medications:    tradjenta 5mg daily  Metformin 1g BID  a1c pending    Diabetes Complications include:     Hyperglycemia    Complicating diabetes co morbidities:   Glucocorticoid use       HPI:   Patient is a 67 y.o. male with a diagnosis of T2DM (on oral mediation, a1c pending), HTN, HLD, MG (Ach-R ab +, on PDN 30mg, Imuran 50mg qd, mestinon 50mg BID-TID, and IVIG [id number] - last round 1 week ago) admitted for thymectomy.     Endocrinology consulted to assist with BG management post cardiac surgery.    Interval HPI:   Overnight events:  VS stable  Pt is currently on LDC  He is receiving Prednisone 30 mg QD  Eatin%  Nausea: No  Hypoglycemia and intervention: No  Fever: No  TPN and/or TF: No    BP (!) 169/78 (BP Location: Left arm, Patient Position: Lying)   Pulse 66   Temp 98.8 °F (37.1 °C) (Oral)   Resp 15   Ht 5' 11" (1.803 m)   Wt 103.4 kg (227 lb 15.3 oz)   SpO2 98%   BMI 31.79 kg/m²       Labs Reviewed and Include      Recent Labs  Lab 18  0421   *   CALCIUM 9.5   *   K 5.1   CO2 32*   CL 92*   BUN 12   CREATININE 0.9     Lab Results   Component Value Date    WBC 14.84 (H) 2018    HGB 12.8 (L) 2018    HCT 39.8 (L) 2018    MCV 93 2018     2018     No results for input(s): TSH, FREET4 in the last 168 hours.  No results found for: HGBA1C    Nutritional status:   Body mass index is 31.79 kg/m².  Lab Results   Component Value Date    ALBUMIN 3.9 04/10/2018    ALBUMIN 3.4 (L) 2018    ALBUMIN 3.7 10/10/2017     No results found for: PREALBUMIN    Estimated Creatinine Clearance: 97.4 mL/min (based on SCr of 0.9 mg/dL).    Accu-Checks  Recent Labs      18   0259  18   0413  18   0455  18   0603  18   0642  " 07/17/18   0751  07/17/18   1146  07/17/18   1722  07/17/18   2129  07/18/18   0847   POCTGLUCOSE  80  98  96  124*  113*  108  122*  158*  185*  193*       Current Medications and/or Treatments Impacting Glycemic Control  Immunotherapy:  Immunosuppressants     None        Steroids:   Hormones     Start     Stop Route Frequency Ordered    07/17/18 0900  predniSONE tablet 30 mg      -- Oral Daily 07/16/18 1815        Pressors:    Autonomic Drugs     Start     Stop Route Frequency Ordered    07/16/18 1830  pyridostigmine tablet 60 mg      -- Oral 4 times daily 07/16/18 1816        Hyperglycemia/Diabetes Medications: Antihyperglycemics     Start     Stop Route Frequency Ordered    07/17/18 0933  insulin aspart U-100 pen 0-5 Units      -- SubQ Before meals & nightly PRN 07/17/18 0833          ASSESSMENT and PLAN    * Myasthenia gravis      On chronic steroids, managed per neurology.  S/p Thymectomy.    Avoid hypoglycemia.        Type 2 diabetes mellitus    Outpatient on orals only.   A1c pending    Currently on Prednisone 30 mg, daily.   BG could increase while on Prednisone.    Currently on low dose correction.  Would start weight based regimen:  Detemir 5 unit, hs  Aspart 3 units AC  POC AC/HS    Discharge:  TBD            Andria Tse MD  Endocrinology  [facility],  have personally taken the history and examined the patient and agree with the resident's note as stated above.

## 2018-07-18 NOTE — PT/OT/SLP EVAL
Occupational Therapy   Evaluation    Name: Otoniel Candelario  MRN: [id number]  Admitting Diagnosis:  Myasthenia gravis 2 Days Post-Op    Recommendations:     Discharge Recommendations: home health OT  Discharge Equipment Recommendations:  none  Barriers to discharge:  None    History:     Occupational Profile:  Living Environment: Pt lives with wife and elderly in-laws in 1SH with ramp access. Home has walk-in shower with hand held shower head, grab bars, and built-in bench  Previous level of function: PTA, pt was independent with all ADL and light IADL. Pt states he was not using any AD for ambulation or ADLs. Pt was doing minimal driving and reports he is mostly at home during the day  Roles and Routines: , father  Equipment Owned:  bedside commode, bath bench, wheelchair, walker, standard, cane, straight, shower chair (owns but does not use)  Assistance upon Discharge: Wife/family assistance as needed following d/c     Past Medical History:   Diagnosis Date    Diabetes mellitus     High cholesterol     Hypertension     Myasthenia gravis 03/2017       Past Surgical History:   Procedure Laterality Date    APPENDECTOMY      HERNIA REPAIR      ROBOT-ASSISTED EXPLORATORY LAPAROSCOPY N/A 7/16/2018    Procedure: ROBOTIC LAPAROSCOPY, EXPLORATORY;  Surgeon: Ricky Hernandez MD;  Location: [facility] OR [address] [place] [address];  Service: Thoracic;  Laterality: N/A;    STERNOTOMY N/A 7/16/2018    Procedure: STERNOTOMY median, thymectomy;  Surgeon: Ricky Hernandez MD;  Location: [facility] OR [address] [place] [address];  Service: Thoracic;  Laterality: N/A;       Subjective     Chief Complaint: pain  Patient/Family stated goals: return to PLOF  Communicated with: RN prior to session.  Pain/Comfort:  · Pain Rating 1: 5/10  · Location - Orientation 1: midline  · Location 1: sternal  · Pain Addressed 1: Pre-medicate for activity, Reposition, Distraction, Cessation of Activity  · Pain Rating Post-Intervention 1: 5/10    Patients cultural, spiritual, Tenriism  conflicts given the current situation: none reported    Objective:     Patient found with: peripheral IV, telemetry, pulse ox (continuous), blood pressure cuff, oxygen    General Precautions: Standard, fall, sternal   Orthopedic Precautions:N/A   Braces: N/A     Occupational Performance:    Bed Mobility:    · Patient completed Rolling/Turning to Right with stand by assistance  · Patient completed Scooting/Bridging with stand by assistance  · Patient completed Supine to Sit with minimum assistance    Functional Mobility/Transfers:  · Patient completed Sit <> Stand Transfer with contact guard assistance  with  no assistive device   · Patient completed Bed <> Chair Transfer using Step Transfer technique with contact guard assistance with no assistive device  · Functional Mobility: Pt took 3 steps bed>chair with CGA using no AD; further ambulation not attempted 2/2 pt c/o light-headed    Activities of Daily Living:  · NT    Cognitive/Visual Perceptual:  Cognitive/Psychosocial Skills:     -       Oriented to: Person, Place and Situation   -       Follows Commands/attention:Follows one-step commands  -       Communication: clear/fluent  -       Memory: No Deficits noted  -       Safety awareness/insight to disability: intact   -       Mood/Affect/Coping skills/emotional control: Appropriate to situation  Visual/Perceptual:      -Intact    Physical Exam:  Balance:    -       good sitting balance; fair standing balance  Postural examination/scapula alignment:    -       No postural abnormalities identified  Skin integrity: Visible skin intact  Edema:  None noted  Sensation:    -       Pt reports B hand/foot numbness 2/2 myasthenia gravis  Dominant hand:    -       R hand  Upper Extremity Range of Motion:     -       Right Upper Extremity: WFL  -       Left Upper Extremity: WFL  Upper Extremity Strength:    -       Right Upper Extremity: WFL  -       Left Upper Extremity: WFL   Strength:    -       Right Upper Extremity:  "WFL  -       Left Upper Extremity: WFL  Fine Motor Coordination:    -       Intact  Gross motor coordination:   WFL    Patient left up in chair with all lines intact, call button in reach, RN notified and wife present    [facility] 6 Click:  [facility] Total Score: 17    Treatment & Education:  Pt educated on role of OT/POC  Pt/wife educated on sternal precautions and provided with handout  Pt educated on bed mobility and t/f with sternal precautions in place  White board/communication board updated  Education:    Assessment:     Otoniel Candelario is a 67 y.o. male with a medical diagnosis of Myasthenia gravis.  He presents with pain and balance deficits limiting functional mobility and self-care. Pt has sternal precautions in place and required verbal cues during session to maintain.  Performance deficits affecting function are weakness, impaired endurance, impaired functional mobilty, impaired self care skills, impaired balance, gait instability, pain, orthopedic precautions, decreased safety awareness.      Rehab Prognosis:  Good; patient would benefit from acute skilled OT services to address these deficits and reach maximum level of function.         Clinical Decision Makin.  OT Low:  "Pt evaluation falls under low complexity for evaluation coding due to performance deficits noted in 1-3 areas as stated above and 0 co-morbities affecting current functional status. Data obtained from problem focused assessments. No modifications or assistance was required for completion of evaluation. Only brief occupational profile and history review completed."     Plan:     Patient to be seen 4 x/week to address the above listed problems via self-care/home management, therapeutic activities, therapeutic exercises  · Plan of Care Expires: 18  · Plan of Care Reviewed with: patient, spouse    This Plan of care has been discussed with the patient who was involved in its development and understands and is in agreement with the " identified goals and treatment plan    GOALS:    Occupational Therapy Goals        Problem: Occupational Therapy Goal    Goal Priority Disciplines Outcome Interventions   Occupational Therapy Goal     OT, PT/OT Ongoing (interventions implemented as appropriate)    Description:  Goals to be met by: 7/25/18     Patient will increase functional independence with ADLs by performing:    UE Dressing with [place].  LE Dressing with Minimal Assistance.  Grooming while standing at sink with Supervision.  Toileting from toilet with Supervision for hygiene and clothing management.   Supine to sit with Stand-by Assistance.  Toilet transfer to toilet with Stand-by Assistance.                      Time Tracking:     OT Date of Treatment: 07/18/18  OT Start Time: 1045  OT Stop Time: 1100  OT Total Time (min): 15 min    Billable Minutes:Evaluation 15    Leanne Lovett OT  7/18/2018

## 2018-07-18 NOTE — SUBJECTIVE & OBJECTIVE
"Interval HPI:   Overnight events:  VS stable  Pt is currently on LDC  He is receiving Prednisone 30 mg QD  Eatin%  Nausea: No  Hypoglycemia and intervention: No  Fever: No  TPN and/or TF: No    BP (!) 169/78 (BP Location: Left arm, Patient Position: Lying)   Pulse 66   Temp 98.8 °F (37.1 °C) (Oral)   Resp 15   Ht 5' 11" (1.803 m)   Wt 103.4 kg (227 lb 15.3 oz)   SpO2 98%   BMI 31.79 kg/m²     Labs Reviewed and Include      Recent Labs  Lab 18  0421   *   CALCIUM 9.5   *   K 5.1   CO2 32*   CL 92*   BUN 12   CREATININE 0.9     Lab Results   Component Value Date    WBC 14.84 (H) 2018    HGB 12.8 (L) 2018    HCT 39.8 (L) 2018    MCV 93 2018     2018     No results for input(s): TSH, FREET4 in the last 168 hours.  No results found for: HGBA1C    Nutritional status:   Body mass index is 31.79 kg/m².  Lab Results   Component Value Date    ALBUMIN 3.9 04/10/2018    ALBUMIN 3.4 (L) 2018    ALBUMIN 3.7 10/10/2017     No results found for: PREALBUMIN    Estimated Creatinine Clearance: 97.4 mL/min (based on SCr of 0.9 mg/dL).    Accu-Checks  Recent Labs      18   0259  18   0413  18   0455  18   0603  18   0642  18   0751  18   1146  18   1722  18   2129  18   0847   POCTGLUCOSE  80  98  96  124*  113*  108  122*  158*  185*  193*       Current Medications and/or Treatments Impacting Glycemic Control  Immunotherapy:  Immunosuppressants     None        Steroids:   Hormones     Start     Stop Route Frequency Ordered    18 0900  predniSONE tablet 30 mg      -- Oral Daily 18 1815        Pressors:    Autonomic Drugs     Start     Stop Route Frequency Ordered    18 1830  pyridostigmine tablet 60 mg      -- Oral 4 times daily 18 1816        Hyperglycemia/Diabetes Medications: Antihyperglycemics     Start     Stop Route Frequency Ordered    18 0933  insulin aspart U-100 " pen 0-5 Units      -- SubQ Before meals & nightly PRN 07/17/18 0801

## 2018-07-18 NOTE — PLAN OF CARE
Problem: Occupational Therapy Goal  Goal: Occupational Therapy Goal  Goals to be met by: 7/25/18     Patient will increase functional independence with ADLs by performing:    UE Dressing with [place].  LE Dressing with Minimal Assistance.  Grooming while standing at sink with Supervision.  Toileting from toilet with Supervision for hygiene and clothing management.   Supine to sit with Stand-by Assistance.  Toilet transfer to toilet with Stand-by Assistance.    Outcome: Ongoing (interventions implemented as appropriate)  Eval completed; goals established    Comments: Initiate OT DELFINA Lovett OT  7/18/2018

## 2018-07-18 NOTE — PLAN OF CARE
CM attempt to see pt to discuss home health. Pt lying in bed on RA. Sat's 83%. Pt states had worked with therapy.  Nurse in room with pt, placing o2 and working on IS. Will f/u at more appropriate time.

## 2018-07-18 NOTE — PT/OT/SLP PROGRESS
"Physical Therapy Treatment    Patient Name:  Otoniel Candelario   MRN:  [id number]    Recommendations:     Discharge Recommendations:  home health PT   Discharge Equipment Recommendations: none   Barriers to discharge: None    Assessment:     Otoniel Candelario is a 67 y.o. male admitted with a medical diagnosis of Myasthenia gravis.  He presents with the following impairments/functional limitations:  weakness, impaired endurance, impaired functional mobilty, gait instability, impaired balance, impaired self care skills, orthopedic precautions.  Pt was able to amb outside of hospital room for the first time since surgery today.  Demonstrates episodes of LOB, requires Navid with gait and modA to recover from LOB.  HEP established with handout provided.  Recommendation for pt to receive skilled PT services in the home setting upon discharge.  Pt will benefit from acute skilled PT services to address these deficits and reach maximum level of function.      Recent Surgery: Procedure(s) (LRB):  STERNOTOMY median, thymectomy (N/A)  ROBOTIC LAPAROSCOPY, EXPLORATORY (N/A) 2 Days Post-Op    Plan:     During this hospitalization, patient to be seen 4 x/week to address the above listed problems via gait training, therapeutic activities, therapeutic exercises, neuromuscular re-education  · Plan of Care Expires:  08/16/18   Plan of Care Reviewed with: patient, spouse, family    Subjective     Communicated with nursing prior to session.  Patient found sitting in low recliner chair upon PT entry to room, agreeable to treatment.      "Never had none." - referring to pain    Chief Complaint: Difficulty with sit<>stand  Patient comments/goals: To improve ability to amb  Pain/Comfort:  · Pain Rating 1: 0/10    Patients cultural, spiritual, Yazidism conflicts given the current situation: no    Objective:     Patient found with: peripheral IV, telemetry, pulse ox (continuous), oxygen     General Precautions: Standard, sternal, fall   Orthopedic " Precautions:N/A   Braces: N/A     Functional Mobility:    · Bed Mobility:     · Scooting: stand by assistance, CGA for positioning and verbal cuing to inhibit UE use  · Sit to Supine: stand by assistance, with use of bed rail, pt required verbal cuing to inhibit UE use    · Transfers:     · Sit to Stand:  moderate assistance with ed on hand placement in front of pt, ed and demo prior to standing to facilitate ant wt shifting with standing  · Bed to Chair: minimum assistance with  no AD  using  Stand Pivot    · Gait: Pt amb 216', no AD, with Navid, 2 episodes of LOB with modA to recover, on RA, SaO2 93%    · Balance: Min-ModA: dynamic standing balance without AD      AM-PAC 6 CLICK MOBILITY  Turning over in bed (including adjusting bedclothes, sheets and blankets)?: 3  Sitting down on and standing up from a chair with arms (e.g., wheelchair, bedside commode, etc.): 3  Moving from lying on back to sitting on the side of the bed?: 3  Moving to and from a bed to a chair (including a wheelchair)?: 3  Need to walk in hospital room?: 3  Climbing 3-5 steps with a railing?: 3  Basic Mobility Total Score: 18       Therapeutic Activities and Exercises:   Whiteboard update  SaO2 83% following gait training on RA, Supp O2 restored at 3L/min, SaO2 quickly returned to 98%  Ankle pumps: 20 reps, in sit   LAQs: 20 reps each LE perf individually, in sit  Hip abd/add: 20 reps each LE perf individually, in sit  Hip flex: 20 reps each LE perf individually, in sit  Sit-ups: 20 reps, in reclined sitting posture, with tactile cuing    Patient left supine with all lines intact, call button in reach and nursing notified.    GOALS:    Physical Therapy Goals        Problem: Physical Therapy Goal    Goal Priority Disciplines Outcome Goal Variances Interventions   Physical Therapy Goal     PT/OT, PT Ongoing (interventions implemented as appropriate)     Description:  Goals to be met by: 7/31/2018     Patient will increase functional independence  with mobility by performin. Supine to sit with Stand-by Assistance  2. Sit to supine with Stand-by Assistance. - GOAL MET   REVISED: with S  3. Sit to stand transfer with CGA.  4. Bed to chair transfer with CGA without AD.  5. Gait  x 200 feet with CGA, without AD sec to sternal precautions.   6. Lower extremity exercise program x 20 reps per handout, with A PRN.                        Time Tracking:     PT Received On: 18  PT Start Time: 1210     PT Stop Time: 1259  PT Total Time (min): 49 min     Billable Minutes: Gait Training 15, Therapeutic Activity 8 and Therapeutic Exercise 20    Treatment Type: Treatment  PT/PTA: PT           Emily Doran, PT  2018

## 2018-07-18 NOTE — PLAN OF CARE
Problem: Patient Care Overview  Goal: Plan of Care Review  Outcome: Ongoing (interventions implemented as appropriate)  Plan of care reviewed, patient verbalizes understanding. AAOx4. VSS throughout shift. Pain managed with PRN meds throughout shift. Chest tube to water seal. Sternal precautions maintained at all times. Blood sugar checked AC/HS. Patient remains free of falls/injuries. No acute distress at this time. Will continue to monitor.

## 2018-07-18 NOTE — PLAN OF CARE
Problem: Patient Care Overview  Goal: Plan of Care Review  Plan of care reviewed with patient who verbalized understanding.  AAO.  Ambulated in halls with assist.  Pain controlled.  O2 sats WNL on RA.  Ice pack to upper chest.  Call bell remains within reach.  Bed in lowest position.  Frequent rounds made for pt safety.  Patient remains free of any new or additional falls/injury at this time.  VSS, will continue to monitor.

## 2018-07-19 ENCOUNTER — TELEPHONE (OUTPATIENT)
Dept: ENDOCRINOLOGY | Facility: HOSPITAL | Age: 68
End: 2018-07-19

## 2018-07-19 VITALS
HEIGHT: 71 IN | WEIGHT: 227.94 LBS | DIASTOLIC BLOOD PRESSURE: 80 MMHG | BODY MASS INDEX: 31.91 KG/M2 | RESPIRATION RATE: 14 BRPM | SYSTOLIC BLOOD PRESSURE: 162 MMHG | OXYGEN SATURATION: 92 % | HEART RATE: 67 BPM | TEMPERATURE: 98 F

## 2018-07-19 DIAGNOSIS — G70.00 MG (MYASTHENIA GRAVIS): Primary | ICD-10-CM

## 2018-07-19 DIAGNOSIS — Z79.4 TYPE 2 DIABETES MELLITUS WITH COMPLICATION, WITH LONG-TERM CURRENT USE OF INSULIN: Primary | ICD-10-CM

## 2018-07-19 DIAGNOSIS — E11.8 TYPE 2 DIABETES MELLITUS WITH COMPLICATION, WITH LONG-TERM CURRENT USE OF INSULIN: Primary | ICD-10-CM

## 2018-07-19 LAB
ANION GAP SERPL CALC-SCNC: 10 MMOL/L
BASOPHILS # BLD AUTO: 0.02 K/UL
BASOPHILS NFR BLD: 0.1 %
BUN SERPL-MCNC: 13 MG/DL
CALCIUM SERPL-MCNC: 9.4 MG/DL
CHLORIDE SERPL-SCNC: 95 MMOL/L
CO2 SERPL-SCNC: 29 MMOL/L
CREAT SERPL-MCNC: 0.8 MG/DL
DIFFERENTIAL METHOD: ABNORMAL
EOSINOPHIL # BLD AUTO: 0 K/UL
EOSINOPHIL NFR BLD: 0.1 %
ERYTHROCYTE [DISTWIDTH] IN BLOOD BY AUTOMATED COUNT: 13.8 %
EST. GFR  (AFRICAN AMERICAN): >60 ML/MIN/1.73 M^2
EST. GFR  (NON AFRICAN AMERICAN): >60 ML/MIN/1.73 M^2
ESTIMATED AVG GLUCOSE: 246 MG/DL
GLUCOSE SERPL-MCNC: 162 MG/DL
HBA1C MFR BLD HPLC: 10.2 %
HCT VFR BLD AUTO: 37.6 %
HGB BLD-MCNC: 12.7 G/DL
IMM GRANULOCYTES # BLD AUTO: 0.05 K/UL
IMM GRANULOCYTES NFR BLD AUTO: 0.3 %
LYMPHOCYTES # BLD AUTO: 2.3 K/UL
LYMPHOCYTES NFR BLD: 16 %
MAGNESIUM SERPL-MCNC: 2 MG/DL
MCH RBC QN AUTO: 30 PG
MCHC RBC AUTO-ENTMCNC: 33.8 G/DL
MCV RBC AUTO: 89 FL
MONOCYTES # BLD AUTO: 1.3 K/UL
MONOCYTES NFR BLD: 8.5 %
NEUTROPHILS # BLD AUTO: 11 K/UL
NEUTROPHILS NFR BLD: 75 %
NRBC BLD-RTO: 0 /100 WBC
PHOSPHATE SERPL-MCNC: 2.4 MG/DL
PLATELET # BLD AUTO: 252 K/UL
PMV BLD AUTO: 10.1 FL
POCT GLUCOSE: 170 MG/DL (ref 70–110)
POCT GLUCOSE: 255 MG/DL (ref 70–110)
POTASSIUM SERPL-SCNC: 3.9 MMOL/L
RBC # BLD AUTO: 4.23 M/UL
SODIUM SERPL-SCNC: 134 MMOL/L
WBC # BLD AUTO: 14.67 K/UL

## 2018-07-19 PROCEDURE — 85025 COMPLETE CBC W/AUTO DIFF WBC: CPT

## 2018-07-19 PROCEDURE — G8988 SELF CARE GOAL STATUS: HCPCS | Mod: CK

## 2018-07-19 PROCEDURE — 84100 ASSAY OF PHOSPHORUS: CPT

## 2018-07-19 PROCEDURE — 63600175 PHARM REV CODE 636 W HCPCS: Performed by: STUDENT IN AN ORGANIZED HEALTH CARE EDUCATION/TRAINING PROGRAM

## 2018-07-19 PROCEDURE — G8989 SELF CARE D/C STATUS: HCPCS | Mod: CK

## 2018-07-19 PROCEDURE — 25000003 PHARM REV CODE 250: Performed by: STUDENT IN AN ORGANIZED HEALTH CARE EDUCATION/TRAINING PROGRAM

## 2018-07-19 PROCEDURE — 83735 ASSAY OF MAGNESIUM: CPT

## 2018-07-19 PROCEDURE — 36415 COLL VENOUS BLD VENIPUNCTURE: CPT

## 2018-07-19 PROCEDURE — 80048 BASIC METABOLIC PNL TOTAL CA: CPT

## 2018-07-19 PROCEDURE — G8987 SELF CARE CURRENT STATUS: HCPCS | Mod: CK

## 2018-07-19 PROCEDURE — 25000003 PHARM REV CODE 250: Performed by: SURGERY

## 2018-07-19 PROCEDURE — 83036 HEMOGLOBIN GLYCOSYLATED A1C: CPT

## 2018-07-19 PROCEDURE — 25000003 PHARM REV CODE 250: Performed by: PHYSICIAN ASSISTANT

## 2018-07-19 PROCEDURE — 99232 SBSQ HOSP IP/OBS MODERATE 35: CPT | Mod: ,,, | Performed by: INTERNAL MEDICINE

## 2018-07-19 RX ORDER — LANCETS
1 EACH MISCELLANEOUS 2 TIMES DAILY WITH MEALS
Qty: 100 EACH | Refills: 0 | Status: SHIPPED | OUTPATIENT
Start: 2018-07-19 | End: 2019-02-27

## 2018-07-19 RX ORDER — OXYCODONE HYDROCHLORIDE 10 MG/1
10 TABLET ORAL EVERY 4 HOURS PRN
Qty: 40 TABLET | Refills: 0 | Status: SHIPPED | OUTPATIENT
Start: 2018-07-19 | End: 2018-08-28

## 2018-07-19 RX ORDER — LANCING DEVICE
1 EACH MISCELLANEOUS 2 TIMES DAILY WITH MEALS
Qty: 1 EACH | Refills: 0 | Status: SHIPPED | OUTPATIENT
Start: 2018-07-19 | End: 2019-02-27

## 2018-07-19 RX ORDER — PEN NEEDLE, DIABETIC 30 GX3/16"
1 NEEDLE, DISPOSABLE MISCELLANEOUS
Qty: 100 EACH | Refills: 0 | COMMUNITY
Start: 2018-07-19 | End: 2018-11-27

## 2018-07-19 RX ORDER — DEXTROSE 4 G
TABLET,CHEWABLE ORAL
Qty: 1 EACH | Refills: 0 | Status: SHIPPED | OUTPATIENT
Start: 2018-07-19 | End: 2019-02-27

## 2018-07-19 RX ORDER — CEPHALEXIN 500 MG/1
500 CAPSULE ORAL EVERY 6 HOURS
Qty: 24 CAPSULE | Refills: 0 | Status: SHIPPED | OUTPATIENT
Start: 2018-07-19 | End: 2018-07-25

## 2018-07-19 RX ORDER — ISOPROPYL ALCOHOL 70 ML/100ML
1 SWAB TOPICAL
Qty: 200 EACH | Refills: 0 | COMMUNITY
Start: 2018-07-19 | End: 2022-03-31

## 2018-07-19 RX ORDER — INSULIN ASPART 100 [IU]/ML
3 INJECTION, SOLUTION INTRAVENOUS; SUBCUTANEOUS
Qty: 15 ML | Refills: 0 | Status: SHIPPED | OUTPATIENT
Start: 2018-07-19 | End: 2018-11-27

## 2018-07-19 RX ADMIN — PANTOPRAZOLE SODIUM 40 MG: 40 TABLET, DELAYED RELEASE ORAL at 08:07

## 2018-07-19 RX ADMIN — SENNOSIDES AND DOCUSATE SODIUM 2 TABLET: 8.6; 5 TABLET ORAL at 08:07

## 2018-07-19 RX ADMIN — PYRIDOSTIGMINE BROMIDE 60 MG: 60 TABLET ORAL at 08:07

## 2018-07-19 RX ADMIN — CEPHALEXIN 500 MG: 500 CAPSULE ORAL at 06:07

## 2018-07-19 RX ADMIN — INSULIN ASPART 3 UNITS: 100 INJECTION, SOLUTION INTRAVENOUS; SUBCUTANEOUS at 01:07

## 2018-07-19 RX ADMIN — ISOSORBIDE MONONITRATE 30 MG: 30 TABLET, EXTENDED RELEASE ORAL at 06:07

## 2018-07-19 RX ADMIN — POLYETHYLENE GLYCOL 3350 17 G: 17 POWDER, FOR SOLUTION ORAL at 08:07

## 2018-07-19 RX ADMIN — OXYCODONE HYDROCHLORIDE 10 MG: 5 TABLET ORAL at 02:07

## 2018-07-19 RX ADMIN — OXYCODONE HYDROCHLORIDE 10 MG: 5 TABLET ORAL at 07:07

## 2018-07-19 RX ADMIN — ASPIRIN 81 MG: 81 TABLET, COATED ORAL at 08:07

## 2018-07-19 RX ADMIN — INSULIN ASPART 3 UNITS: 100 INJECTION, SOLUTION INTRAVENOUS; SUBCUTANEOUS at 08:07

## 2018-07-19 RX ADMIN — PREDNISONE 30 MG: 20 TABLET ORAL at 08:07

## 2018-07-19 RX ADMIN — CEPHALEXIN 500 MG: 500 CAPSULE ORAL at 11:07

## 2018-07-19 NOTE — ASSESSMENT & PLAN NOTE
Outpatient on orals only.     Currently on Prednisone 30 mg, daily.   BG could increase while on Prednisone.    Currently on basal bolus regimen:   Detemir 5 units, hs  Aspart 3 units AC    BG levels at goal on the current regimen    Discharge recommendations:  -Given pt's elevated HbA1c of 10.2, would recommend discharging him on the current insulin regimen: Detemir 5 units at bedtime and Aspart 3 units with meals.   -Would advise pt to follow up with his PCP  -Will refer pt to Diabetic Education

## 2018-07-19 NOTE — PROGRESS NOTES
"[facility]  Endocrinology  Progress Note    Admit Date: 7/16/2018     Reason for Consult: Management of T2DM, Hyperglycemia     Surgical Procedure and Date: Thymectomy 7/16/18    Home Diabetes Medications:    tradjenta 5mg daily  Metformin 1g BID    HbA1c: 10.2    Diabetes Complications include:     Hyperglycemia    Complicating diabetes co morbidities:   Glucocorticoid use       HPI:   Patient is a 67 y.o. male with a diagnosis of T2DM (on oral mediation, a1c pending), HTN, HLD, MG (Ach-R ab +, on PDN 30mg, Imuran 50mg qd, mestinon 50mg BID-TID, and IVIG [id number] - last round 1 week ago) admitted for thymectomy.     Endocrinology consulted to assist with BG management post cardiac surgery.    Interval HPI:   Overnight events:  VS stable  BG slightly above goal.    Eating:   <25%  Nausea: No  Hypoglycemia and intervention: No  Fever: No  TPN and/or TF: No    BP (!) 171/82 (BP Location: Right arm, Patient Position: Lying)   Pulse 80   Temp 97.8 °F (36.6 °C) (Oral)   Resp (!) 21   Ht 5' 11" (1.803 m)   Wt 103.4 kg (227 lb 15.3 oz)   SpO2 98%   BMI 31.79 kg/m²       Labs Reviewed and Include      Recent Labs  Lab 07/19/18  0449   *   CALCIUM 9.4   *   K 3.9   CO2 29   CL 95   BUN 13   CREATININE 0.8     Lab Results   Component Value Date    WBC 14.67 (H) 07/19/2018    HGB 12.7 (L) 07/19/2018    HCT 37.6 (L) 07/19/2018    MCV 89 07/19/2018     07/19/2018     No results for input(s): TSH, FREET4 in the last 168 hours.  Lab Results   Component Value Date    HGBA1C 10.2 (H) 07/19/2018       Nutritional status:   Body mass index is 31.79 kg/m².  Lab Results   Component Value Date    ALBUMIN 3.9 04/10/2018    ALBUMIN 3.4 (L) 03/13/2018    ALBUMIN 3.7 10/10/2017     No results found for: PREALBUMIN    Estimated Creatinine Clearance: 109.6 mL/min (based on SCr of 0.8 mg/dL).    Accu-Checks  Recent Labs      07/17/18   0603  07/17/18   0642  07/17/18   0751  07/17/18   1146  " 07/17/18   1722  07/17/18   2129  07/18/18   0847  07/18/18   1300  07/18/18   1812  07/18/18   2318   POCTGLUCOSE  124*  113*  108  122*  158*  185*  193*  267*  244*  193*       Current Medications and/or Treatments Impacting Glycemic Control  Immunotherapy:  Immunosuppressants     None        Steroids:   Hormones     Start     Stop Route Frequency Ordered    07/17/18 0900  predniSONE tablet 30 mg      -- Oral Daily 07/16/18 1815        Pressors:    Autonomic Drugs     Start     Stop Route Frequency Ordered    07/16/18 1830  pyridostigmine tablet 60 mg      -- Oral 4 times daily 07/16/18 1816        Hyperglycemia/Diabetes Medications: Antihyperglycemics     Start     Stop Route Frequency Ordered    07/18/18 2100  insulin detemir U-100 pen 5 Units      -- SubQ Nightly 07/18/18 1503    07/18/18 1645  insulin aspart U-100 pen 3 Units      -- SubQ 3 times daily with meals 07/18/18 1503    07/17/18 0933  insulin aspart U-100 pen 0-5 Units      -- SubQ Before meals & nightly PRN 07/17/18 0833          ASSESSMENT and PLAN    * Myasthenia gravis      On chronic steroids, managed per neurology.  S/p Thymectomy.    Avoid hypoglycemia.        Current chronic use of systemic steroids    Can cause prandial excursions          Type 2 diabetes mellitus    Outpatient on orals only.     Currently on Prednisone 30 mg, daily.   BG could increase while on Prednisone.    Currently on basal bolus regimen:   Detemir 5 units, hs  Aspart 3 units AC    BG levels at goal on the current regimen    Discharge recommendations:  -Given pt's elevated HbA1c of 10.2, would recommend discharging him on the current insulin regimen: Detemir 5 units at bedtime and Aspart 3 units with meals.   -Would advise pt to follow up with his PCP  -Will refer pt to Diabetic Education            Andria Tse MD  Endocrinology  [facility], Marie Elena MD,  have personally taken the history and examined the patient and agree with the  resident's note as stated above.

## 2018-07-19 NOTE — PLAN OF CARE
Pt wants [facility].  SW sent the referral and will f/u shortly.    Dominga Moseley, [facility]  [phone]

## 2018-07-19 NOTE — PLAN OF CARE
CM called [facility] [phone] and spoke with Amanda in intake. They will accept pt and see him tomorrow. CM updated team and pt nurse.

## 2018-07-19 NOTE — PLAN OF CARE
Problem: Patient Care Overview  Goal: Plan of Care Review  Outcome: Ongoing (interventions implemented as appropriate)  Pt AAOx4. Pt tolerating diabetic diet with no complaints of discomfort or nausea. Pain managed with PRN pain meds overnight. Pt on tele and cont pulse ox, NSR, all other VSS on RA. SBP <180 maintained. Urinal within reach. Sternal precautions maintained. Pt ambulates to the bathroom with assist x1. Call light in reach and bed in lowest position. Pt remains free of falls and injury. No acute events this shift. Will continue to monitor.

## 2018-07-19 NOTE — SUBJECTIVE & OBJECTIVE
"Interval HPI:   Overnight events:  VS stable  BG slightly above goal.    Eating:   <25%  Nausea: No  Hypoglycemia and intervention: No  Fever: No  TPN and/or TF: No    BP (!) 171/82 (BP Location: Right arm, Patient Position: Lying)   Pulse 80   Temp 97.8 °F (36.6 °C) (Oral)   Resp (!) 21   Ht 5' 11" (1.803 m)   Wt 103.4 kg (227 lb 15.3 oz)   SpO2 98%   BMI 31.79 kg/m²     Labs Reviewed and Include      Recent Labs  Lab 07/19/18  0449   *   CALCIUM 9.4   *   K 3.9   CO2 29   CL 95   BUN 13   CREATININE 0.8     Lab Results   Component Value Date    WBC 14.67 (H) 07/19/2018    HGB 12.7 (L) 07/19/2018    HCT 37.6 (L) 07/19/2018    MCV 89 07/19/2018     07/19/2018     No results for input(s): TSH, FREET4 in the last 168 hours.  Lab Results   Component Value Date    HGBA1C 10.2 (H) 07/19/2018       Nutritional status:   Body mass index is 31.79 kg/m².  Lab Results   Component Value Date    ALBUMIN 3.9 04/10/2018    ALBUMIN 3.4 (L) 03/13/2018    ALBUMIN 3.7 10/10/2017     No results found for: PREALBUMIN    Estimated Creatinine Clearance: 109.6 mL/min (based on SCr of 0.8 mg/dL).    Accu-Checks  Recent Labs      07/17/18   0603  07/17/18   0642  07/17/18   0751  07/17/18   1146  07/17/18   1722  07/17/18   2129  07/18/18   0847  07/18/18   1300  07/18/18   1812  07/18/18   2318   POCTGLUCOSE  124*  113*  108  122*  158*  185*  193*  267*  244*  193*       Current Medications and/or Treatments Impacting Glycemic Control  Immunotherapy:  Immunosuppressants     None        Steroids:   Hormones     Start     Stop Route Frequency Ordered    07/17/18 0900  predniSONE tablet 30 mg      -- Oral Daily 07/16/18 1815        Pressors:    Autonomic Drugs     Start     Stop Route Frequency Ordered    07/16/18 1830  pyridostigmine tablet 60 mg      -- Oral 4 times daily 07/16/18 1816        Hyperglycemia/Diabetes Medications: Antihyperglycemics     Start     Stop Route Frequency Ordered    07/18/18 2100  " insulin detemir U-100 pen 5 Units      -- SubQ Nightly 07/18/18 1503    07/18/18 1645  insulin aspart U-100 pen 3 Units      -- SubQ 3 times daily with meals 07/18/18 1503    07/17/18 0933  insulin aspart U-100 pen 0-5 Units      -- SubQ Before meals & nightly PRN 07/17/18 0822

## 2018-07-19 NOTE — PROGRESS NOTES
Pt and wife educated on insulin pen administration.  Pt and wife educated on how to administer insulin pen safely.  Handout was also provided for take home education.  Pt's wife able to demonstrate properly how she will inject the correct amount of insulin.  All questions answered.  S/S hypo and hyperglycemia reviewed with pt and handout provided.  Pt instructed to check CBG before each meal.  Both verbalized understanding of the above.

## 2018-07-19 NOTE — DISCHARGE SUMMARY
[facility]  General Surgery  Discharge Summary      Patient Name: Otoniel Candelario  MRN: [id number]  Admission Date: 7/16/2018  Hospital Length of Stay: 3 days  Discharge Date and Time:  07/19/2018 2:01 PM  Attending Physician: Dr. Ricky Hernandez   Discharging Provider: Ailene Dan PA-C  Primary Care Provider: Win Elliott MD     HPI:   Patient is a 67 y.o. male presents with HTN, DM, HLD, MG (Ach-R ab +) with MG in March 2017 here today for evaluation of thymectomy. Experiences generalized weakness, fatigue, ptosis, slurred speech, and choking on liquids. Symptoms largely controlled with mestinon, prednisone and IVIG. Also started on Imuran 50mg daily by neurologist. Current regimen is Prednisone 30mg, Mestinon 60mg BID-TID, and IVIG [id number]. Last IVIG 1 week ago. ecreased muscle fatigue, has been working outside in the yard. He started following with cardiologist for SOB/BETHEA prior to MG diagnosis. Also reports mild orthopnea. He believes he has had a stress test in the past. Denies prior myasthenia crisis. Never been intubated for MG. Denies blood thinners. Denies fever, chills, CP, baseline SOB, appetite or weight changes.      Smokes 1 cigar daily. Occasional ETOH.   Appendectomy, bilateral inguinal hernia repairs, vasectomy  Retired mail .     Procedure(s) (LRB):  STERNOTOMY median, thymectomy (N/A)  ROBOTIC LAPAROSCOPY, EXPLORATORY (N/A)     Hospital Course:   7/16/18- Attempted robotic converted to median sternotomy for thymectomy. Restarted on home myasthenia meds on POD0.    7/17/18- DC milan. Chest tube to water seal. Advance diet. Wean NC O2. Step down to floor.   7/18/18 -  Remove chest tube today. Wean to oxygen   7/19/18 - Stable for discharge. Endo discharge recs for insulin.  PT/OT set up.     Consults:   Consults         Status Ordering Provider     Consult to Dietary  Once     Provider:  (Not yet assigned)    SUSY Ogden     Consult to  Endocrinology  Once     Provider:  (Not yet assigned)    Completed YANNI ALONSO          Significant Diagnostic Studies: Labs:   BMP:   Recent Labs  Lab 07/18/18  0420 07/18/18  0421 07/19/18  0449 07/19/18  0450   GLU  --  191* 162*  --    NA  --  133* 134*  --    K  --  5.1 3.9  --    CL  --  92* 95  --    CO2  --  32* 29  --    BUN  --  12 13  --    CREATININE  --  0.9 0.8  --    CALCIUM  --  9.5 9.4  --    MG 2.0  --   --  2.0    and CBC   Recent Labs  Lab 07/18/18  0420 07/19/18  0450   WBC 14.84* 14.67*   HGB 12.8* 12.7*   HCT 39.8* 37.6*    252     Radiology: X-Ray: CXR: X-Ray Chest 1 View (CXR):   Results for orders placed or performed during the hospital encounter of 07/16/18   X-Ray Chest 1 View    Narrative    EXAMINATION:  XR CHEST 1 VIEW    CLINICAL HISTORY:  post pull cxr at 3pm;    TECHNIQUE:  Single frontal view of the chest was performed.    COMPARISON:  Prior dated 07/18/2018    FINDINGS:  The mediastinal structures are midline.  Sternotomy wires are unchanged.  The cardiac silhouette is not well evaluated due to AP technique.  There is hazy opacity at the left lung base suggesting pleural fluid with adjacent atelectasis versus consolidation.  No pneumothorax.    No osseous abnormalities are seen.      Impression    No significant change.      Electronically signed by: Yanni Kee MD  Date:    07/18/2018  Time:    17:39       Pending Diagnostic Studies:     None        Final Active Diagnoses:    Diagnosis Date Noted POA    PRINCIPAL PROBLEM:  Myasthenia gravis [G70.00] 10/12/2017 Yes    Type 2 diabetes mellitus [E11.9] 07/16/2018 Yes    Current chronic use of systemic steroids [Z79.52] 07/16/2018 Not Applicable      Problems Resolved During this Admission:    Diagnosis Date Noted Date Resolved POA      Discharged Condition: good    Disposition: Home or Self Care    Follow Up:  Follow-up Information     Ricky Hernandez MD.    Specialty:  Cardiothoracic Surgery  Contact  "information:  [address]  [facility] [address]  [phone]                 Patient Instructions:     Diet diabetic     Call MD for:  temperature >100.4     Call MD for:  persistent nausea and vomiting or diarrhea     Call MD for:  severe uncontrolled pain     Call MD for:  redness, tenderness, or signs of infection (pain, swelling, redness, odor or green/yellow discharge around incision site)     Call MD for:  difficulty breathing or increased cough     Call MD for:  severe persistent headache     Call MD for:  worsening rash     Call MD for:  persistent dizziness, light-headedness, or visual disturbances     Call MD for:  increased confusion or weakness     Remove dressing in 48 hours     Other restrictions (specify):   Order Comments: Follow sternal precautions     Lifting restrictions   Order Comments: No more than 10lbs. No pushing or pulling motions.     Shower on day dressing removed (No bath)       Medications:  Reconciled Home Medications:      Medication List      START taking these medications    alcohol swabs Padm  Apply 1 each topically 2 hours after meals and at bedtime.     BD ULTRA-FINE JALEESA PEN NEEDLE 32 gauge x 5/32" Ndle  Generic drug:  pen needle, diabetic  Test blood sugar 2 hours after meals and at bedtime.     cephALEXin 500 MG capsule  Commonly known as:  KEFLEX  Take 1 capsule (500 mg total) by mouth every 6 (six) hours. for 6 days     lancing device Misc  1 Device by Misc.(Non-Drug; Combo Route) route 2 (two) times daily with meals.     LEVEMIR FLEXTOUCH U-100 INSULN 100 unit/mL (3 mL) Inpn pen  Generic drug:  insulin detemir U-100  Inject 5 Units into the skin every evening.     NovoLOG Flexpen U-100 Insulin 100 unit/mL Inpn pen  Generic drug:  insulin aspart U-100  Inject 3 Units into the skin 3 (three) times daily with meals.     oxyCODONE 10 mg Tab immediate release tablet  Commonly known as:  ROXICODONE  Take 1 tablet (10 mg total) by mouth every 4 (four) hours as needed.      "   CHANGE how you take these medications    azaTHIOprine 50 mg Tab  Commonly known as:  IMURAN  Take 1 tablet (50 mg total) by mouth 2 (two) times daily. Start AFTER IVIG infusion (monday)  What changed:  additional instructions     omeprazole 20 MG capsule  Commonly known as:  PRILOSEC  Take 1 capsule (20 mg total) by mouth once daily.  What changed:  when to take this     * ONETOUCH DELICA LANCETS 30 gauge Misc  Generic drug:  lancets  TEST BLOOD SUGAR ONCE DAILY  What changed:  Another medication with the same name was added. Make sure you understand how and when to take each.     * lancets Misc  1 lancet by Misc.(Non-Drug; Combo Route) route 2 (two) times daily with meals.  What changed:  You were already taking a medication with the same name, and this prescription was added. Make sure you understand how and when to take each.     * ONETOUCH ULTRA TEST Strp  Generic drug:  blood sugar diagnostic  TEST BLOOD SUGAR ONCE DAILY  What changed:  Another medication with the same name was added. Make sure you understand how and when to take each.     * blood sugar diagnostic Strp  Test blood sugar 2 (two) times daily with meals.  What changed:  You were already taking a medication with the same name, and this prescription was added. Make sure you understand how and when to take each.     * ONETOUCH ULTRA2 kit  Generic drug:  blood-glucose meter  USE TO TEST BLOOD GLUCOSE DAILY  What changed:  Another medication with the same name was added. Make sure you understand how and when to take each.     * blood-glucose meter Misc  Use as directed  What changed:  You were already taking a medication with the same name, and this prescription was added. Make sure you understand how and when to take each.     predniSONE 20 MG tablet  Commonly known as:  DELTASONE  Take 1.5 tablets (30 mg total) by mouth once daily. Take 30 mg (1 and a half pills) daily.  What changed:  · when to take this  · additional instructions        * This  list has 6 medication(s) that are the same as other medications prescribed for you. Read the directions carefully, and ask your doctor or other care provider to review them with you.            CONTINUE taking these medications    aspirin 81 MG EC tablet  Commonly known as:  ECOTRIN  Take 81 mg by mouth once daily.     atorvastatin 80 MG tablet  Commonly known as:  LIPITOR  Take 80 mg by mouth every evening.     isosorbide mononitrate 30 MG 24 hr tablet  Commonly known as:  IMDUR  Take 30 mg by mouth every morning.     lisinopril 40 MG tablet  Commonly known as:  PRINIVIL,ZESTRIL  Take 40 mg by mouth 2 (two) times daily.     metFORMIN 1000 MG tablet  Commonly known as:  GLUCOPHAGE  Take 1,000 mg by mouth 2 (two) times daily with meals.     pyridostigmine 60 mg Tab  Commonly known as:  MESTINON  Take 1 tablet (60 mg total) by mouth 4 (four) times daily. Take mestinon 3-4 times per day.     sertraline 50 MG tablet  Commonly known as:  ZOLOFT  TAKE 1/2 TABLET BY MOUTH AT BEDTIME     TRADJENTA 5 mg Tab tablet  Generic drug:  linagliptin  Take 5 mg by mouth every morning.            Aileen Dan PA-C  General Surgery  [facility]

## 2018-07-19 NOTE — PROGRESS NOTES
Discharge instructions reviewed with pt.  Prescriptions reviewed and given to pt.  Insulin pens/supplies delivered to bedside. Sternal precautions reviewed and handout with pt.  Pt verbalized understanding.  All questions answered.  PIV x2 d'c'd with cath tip intact and discarded.  Pt currently awaiting transport.

## 2018-07-19 NOTE — PLAN OF CARE
CM called pt's wife Rhonda [phone] to speak about HH and get pt's address. [address], [place] [address]. Wife wishes for MD [facility].

## 2018-07-20 ENCOUNTER — TELEPHONE (OUTPATIENT)
Dept: CARDIOTHORACIC SURGERY | Facility: CLINIC | Age: 68
End: 2018-07-20

## 2018-07-20 NOTE — PT/OT/SLP DISCHARGE
Occupational Therapy Discharge Summary    Otoniel Candelario  MRN: [id number]   Principal Problem: Myasthenia gravis      Patient Discharged from acute Occupational Therapy on 7/20/18.  Please refer to prior OT note dated 7/18/18 for functional status.    Assessment:      Patient has not met goals.    Objective:     GOALS:    Occupational Therapy Goals     Not on file          Multidisciplinary Problems (Resolved)        Problem: Occupational Therapy Goal    Goal Priority Disciplines Outcome Interventions   Occupational Therapy Goal   (Resolved)     OT, PT/OT Outcome(s) achieved    Description:  Goals to be met by: 7/25/18     Patient will increase functional independence with ADLs by performing:    UE Dressing with [place]. NOT MET   LE Dressing with Minimal Assistance. NOT MET   Grooming while standing at sink with Supervision. NOT MET   Toileting from toilet with Supervision for hygiene and clothing management. NOT MET   Supine to sit with Stand-by Assistance. NOT MET   Toilet transfer to toilet with Stand-by Assistance. NOT MET                        Reasons for Discontinuation of Therapy Services  Transfer to alternate level of care.      Plan:     Patient Discharged to: Home with Home Health Service    Leanne Lovett, OT  7/20/2018

## 2018-07-20 NOTE — TELEPHONE ENCOUNTER
Called  Otoniel Candelario regarding his first day home post-operatively.  Patient was asked if he was experiencing any fever or pain.  Patient replied that he had no pain or fever.  Also asked patient if he had a bowel movement yet.  Patient stated that he had been passing gas but had not had a bowel movement yet.  Also stated that he had taken a dose of EXLAX that morning. Patient says that he will repeat the EXLAX dose tonight if he still has not had a bowel movement.  Also, reinforced the importance of follow-up with PCP regarding being discharged on insulin and for the  management of his diabetes. Encouraged patient to keep all follow-up appointments.  Patient verbalizes his uderstanding.

## 2018-07-20 NOTE — PLAN OF CARE
Problem: Occupational Therapy Goal  Goal: Occupational Therapy Goal  Goals to be met by: 7/25/18     Patient will increase functional independence with ADLs by performing:    UE Dressing with [place]. NOT MET   LE Dressing with Minimal Assistance. NOT MET   Grooming while standing at sink with Supervision. NOT MET   Toileting from toilet with Supervision for hygiene and clothing management. NOT MET   Supine to sit with Stand-by Assistance. NOT MET   Toilet transfer to toilet with Stand-by Assistance. NOT MET      Outcome: Outcome(s) achieved Date Met: 07/20/18  Eval only completed, no goals met; pt d/c from hospital

## 2018-07-21 NOTE — PT/OT/SLP DISCHARGE
Physical Therapy Discharge Summary    Name: Otoniel Candelario  MRN: [id number]   Principal Problem: Myasthenia gravis     Patient Discharged from acute Physical Therapy on 18.  Please refer to prior PT noted date on 18 for functional status.     Assessment:     Patient appropriate for care in another setting.    Objective:     GOALS:    Physical Therapy Goals     Not on file          Multidisciplinary Problems (Resolved)        Problem: Physical Therapy Goal    Goal Priority Disciplines Outcome Goal Variances Interventions   Physical Therapy Goal   (Resolved)     PT/OT, PT Outcome(s) achieved     Description:  Goals to be met by: 2018     Patient will increase functional independence with mobility by performin. Supine to sit with Stand-by Assistance  2. Sit to supine with Stand-by Assistance. - GOAL MET   REVISED: with S  3. Sit to stand transfer with CGA.  4. Bed to chair transfer with CGA without AD.  5. Gait  x 200 feet with CGA, without AD sec to sternal precautions.   6. Lower extremity exercise program x 20 reps per handout, with A PRN.                        Reasons for Discontinuation of Therapy Services  Transfer to alternate level of care.      Plan:     Patient Discharged to: Home with Home Health Service.    Emily Doran, PT  2018

## 2018-08-03 ENCOUNTER — HOSPITAL ENCOUNTER (OUTPATIENT)
Dept: RADIOLOGY | Facility: HOSPITAL | Age: 68
Discharge: HOME | End: 2018-08-03
Payer: OTHER GOVERNMENT

## 2018-08-03 ENCOUNTER — CLINICAL SUPPORT (OUTPATIENT)
Dept: DIABETES | Facility: CLINIC | Age: 68
End: 2018-08-03
Payer: OTHER GOVERNMENT

## 2018-08-03 ENCOUNTER — OFFICE VISIT (OUTPATIENT)
Dept: CARDIOTHORACIC SURGERY | Facility: CLINIC | Age: 68
End: 2018-08-03
Payer: OTHER GOVERNMENT

## 2018-08-03 VITALS
SYSTOLIC BLOOD PRESSURE: 136 MMHG | WEIGHT: 223.56 LBS | OXYGEN SATURATION: 99 % | DIASTOLIC BLOOD PRESSURE: 84 MMHG | BODY MASS INDEX: 31.18 KG/M2

## 2018-08-03 DIAGNOSIS — G70.00 MYASTHENIA GRAVIS: Primary | ICD-10-CM

## 2018-08-03 DIAGNOSIS — G70.00 MG (MYASTHENIA GRAVIS): ICD-10-CM

## 2018-08-03 DIAGNOSIS — E11.8 TYPE 2 DIABETES MELLITUS WITH COMPLICATION, WITH LONG-TERM CURRENT USE OF INSULIN: ICD-10-CM

## 2018-08-03 DIAGNOSIS — Z90.89 S/P THYMECTOMY: ICD-10-CM

## 2018-08-03 DIAGNOSIS — Z79.4 TYPE 2 DIABETES MELLITUS WITH COMPLICATION, WITH LONG-TERM CURRENT USE OF INSULIN: ICD-10-CM

## 2018-08-03 PROCEDURE — 71046 X-RAY EXAM CHEST 2 VIEWS: CPT | Mod: 26,,, | Performed by: RADIOLOGY

## 2018-08-03 PROCEDURE — 99999 PR PBB SHADOW E&M-EST. PATIENT-LVL III: CPT | Mod: PBBFAC,,, | Performed by: THORACIC SURGERY (CARDIOTHORACIC VASCULAR SURGERY)

## 2018-08-03 PROCEDURE — G0108 DIAB MANAGE TRN  PER INDIV: HCPCS | Mod: S$GLB,,, | Performed by: INTERNAL MEDICINE

## 2018-08-03 PROCEDURE — 71046 X-RAY EXAM CHEST 2 VIEWS: CPT | Mod: TC,FY

## 2018-08-03 PROCEDURE — 99024 POSTOP FOLLOW-UP VISIT: CPT | Mod: S$GLB,,, | Performed by: THORACIC SURGERY (CARDIOTHORACIC VASCULAR SURGERY)

## 2018-08-03 NOTE — PROGRESS NOTES
Subjective:       Patient ID: Otoniel Candelario is a 67 y.o. male.    Chief Complaint: Post-op Evaluation    Diagnosis:  Myasthenia Gravis     Pre-operative therapy: Medical magement    Procedure(s) and date(s): 7/16/18- Attempted robotic converted to median sternotomy for thymectomy.     Pathology: Benign thymic tissue. Small fragment of parathyroid tissue, less than 1 mm.     Post-operative therapy: NA    HPI   67 year old male returns for follow up s/p median sternotomy for thymectomy on 7/16/18. Uncomplicated post op course. Discharged with home health PT/OT. Today he reports feeling well. He is slowly returning to normal activities. Denies generalized weakness, fatigue, ptosis, slurred speech, and choking on liquids. Continues to take mestinon and prednisone. Denies fever, SOB, CP, N/V or changes in bowel and bladder functioning.      Review of Systems   Constitutional: Negative for activity change, appetite change, diaphoresis and fever.   HENT: Negative for congestion, trouble swallowing and voice change.    Eyes: Negative for visual disturbance.   Respiratory: Negative for chest tightness, shortness of breath and wheezing.    Cardiovascular: Negative for chest pain, palpitations and leg swelling.   Gastrointestinal: Negative for abdominal distention, diarrhea, nausea and vomiting.   Genitourinary: Negative for decreased urine volume and difficulty urinating.   Musculoskeletal: Negative for arthralgias.   Neurological: Negative for dizziness, seizures, facial asymmetry, weakness and light-headedness.   Psychiatric/Behavioral: Negative for agitation.           Objective:       Vitals:    08/03/18 1019   BP: 136/84   SpO2: 99%   Weight: 101.4 kg (223 lb 8.7 oz)   PainSc: 0-No pain       Physical Exam   Constitutional: He is oriented to person, place, and time. He appears well-developed and well-nourished.   HENT:   Head: Normocephalic.   Mouth/Throat: Oropharynx is clear and moist.   Eyes: Pupils are equal, round,  and reactive to light.   Neck: Normal range of motion. Neck supple. No thyromegaly present.   Cardiovascular: Normal rate, regular rhythm, normal heart sounds and intact distal pulses.    Median sternotomy healing well. No clicking or sternal instability. Superior aspect of incision with 3-4cm hematoma which has decreased in size. Bilateral anterior chest bruising in various stages of healing. No drainage.      Pulmonary/Chest: Effort normal and breath sounds normal. No respiratory distress. He exhibits no tenderness.   Abdominal: Soft. Bowel sounds are normal. He exhibits no distension. There is no tenderness.   Musculoskeletal: Normal range of motion. He exhibits no edema.   Lymphadenopathy:     He has no cervical adenopathy.   Neurological: He is alert and oriented to person, place, and time.   Psychiatric: He has a normal mood and affect.       8/3/18 CXR- Postoperative changes in the thorax as before.  Heart size normal.  No significant airspace consolidation or pleural effusion identified    Assessment:       67 year old male returns for follow up s/p median sternotomy for thymectomy on 7/16/18.       Plan:       Doing well. Recommend continuing PT/OT. Sternal precautions for 8 weeks post op. No driving for 2 more weeks.   RTC in 6 months with repeat noncontrast chest CT    ATTENDING ATTESTATION:    I evaluated the patient and I agree with the assessment and plan

## 2018-08-03 NOTE — PROGRESS NOTES
Diabetes Education  Author: Leanne Silverman RD, CDE  Date: 8/3/2018    Diabetes Education Visit  Diabetes Education Record Assessment/Progress: Initial    Diabetes Type  Diabetes Type : Type II    Accompanied by wife during visit. Writing BG in notepad, not dosing prandial insulin if BG is < 125 mg/dl. Sometimes forgetting or skipping dose with lunch causing high BG ac dinner. Heavy carb snack between lunch and dinner and at bedtime. Reviewed dietary habits and ways to improve BG. Has upcoming scheduled endocrine appt in [place]. Suggested taking insulin as prescribed.     Currently taking tradjenta, metformin and novolog 3 ac meals, levemir 5 units. Reviewed rotation of inection sites, s/s and treatment of hypoglycemia.      Nutrition  Meal Planning: skipping meals    Monitoring   Blood Glucose Logs: Yes  Do you use a personal glucose monitor?: No    Exercise   Exercise Type: none    Current Diabetes Treatment   Current Treatment: Insulin, Oral Medication    Social History  Preferred Learning Method: Face to Face  Primary Support: Spouse    PHQ-2 Total Score: 2    DDS-2 Score  ( > 3 = SIGNIFICANT DISTRESS): 2.5     Barriers to Change  Barriers to Change: None  Learning Challenges : None    Readiness to Learn   Readiness to Learn : Eager    Cultural Influences  Cultural Influences: No    Diabetes Education Assessment/Progress  Diabetes Disease Process (diabetes disease process and treatment options): Discussion, Individual Session, Written Materials Provided  Nutrition (Incorporating nutritional management into one's lifestyle): Discussion, Individual Session, Written Materials Provided  Physical Activity (incorporating physical activity into one's lifestyle): Discussion, Individual Session, Written Materials Provided  Medications (states correct name, dose, onset, peak, duration, side effects & timing of meds): Discussion, Written Materials Provided, Individual Session  Monitoring (monitoring blood  glucose/other parameters & using results): Discussion, Individual Session, Written Materials Provided  Acute Complications (preventing, detecting, and treating acute complications): Discussion, Written Materials Provided, Individual Session  Chronic Complications (preventing, detecting, and treating chronic complications): Discussion, Written Materials Provided, Individual Session  Clinical (diabetes, other pertinent medical history, and relevant comorbidities reviewed during visit): Discussion, Written Materials Provided, Individual Session  Cognitive (knowledge of self-management skills, functional health literacy): Discussion  Psychosocial (emotional response to diabetes): Discussion  Diabetes Distress and Support Systems: Discussion  Behavioral (readiness for change, lifestyle practices, self-care behaviors): Discussion    Goals  Patient has selected/evaluated goals during today's session: Yes, selected  Medications: Set (take insulin as prescribed)  Start Date: 08/03/18  Target Date: 09/03/18    Diabetes Care Plan/Intervention  Education Plan/Intervention: Individual Follow-Up DSMT    Diabetes Meal Plan  Carbohydrate Per Meal: 45-60g    Education Units of Time   Time Spent: 60 min    Health Maintenance was reviewed today with patient. Discussed with patient importance of routine eye exams, foot exams/foot care, blood work (i.e.: A1c, microalbumin, and lipid), dental visits, yearly flu vaccine, and pneumonia vaccine as indicated by PCP. Patient verbalized understanding.     Health Maintenance Topics with due status: Not Due       Topic Last Completion Date    Hemoglobin A1c 07/19/2018    Low Dose Statin 08/03/2018     Health Maintenance Due   Topic Date Due    Hepatitis C Screening  1950    Lipid Panel  1950    Foot Exam  10/15/1960    Eye Exam  10/15/1960    TETANUS VACCINE  10/15/1968    Colonoscopy  10/15/2000    Zoster Vaccine  10/15/2010    Pneumococcal (65+) (1 of 2 - PCV13) 10/15/2015     Abdominal Aortic Aneurysm Screening  10/15/2015    Influenza Vaccine  08/01/2018

## 2018-08-22 RX ORDER — SERTRALINE HYDROCHLORIDE 50 MG/1
TABLET, FILM COATED ORAL
Qty: 15 TABLET | Refills: 3 | OUTPATIENT
Start: 2018-08-22

## 2018-08-28 ENCOUNTER — OFFICE VISIT (OUTPATIENT)
Dept: NEUROLOGY | Facility: CLINIC | Age: 68
End: 2018-08-28
Payer: MEDICARE

## 2018-08-28 VITALS
DIASTOLIC BLOOD PRESSURE: 105 MMHG | HEIGHT: 71 IN | BODY MASS INDEX: 31.76 KG/M2 | WEIGHT: 226.88 LBS | SYSTOLIC BLOOD PRESSURE: 189 MMHG | HEART RATE: 70 BPM

## 2018-08-28 DIAGNOSIS — I10 ESSENTIAL HYPERTENSION: Chronic | ICD-10-CM

## 2018-08-28 DIAGNOSIS — Z79.899 LONG-TERM CURRENT USE OF INTRAVENOUS IMMUNOGLOBULIN (IVIG): ICD-10-CM

## 2018-08-28 DIAGNOSIS — E11.65 TYPE 2 DIABETES MELLITUS WITH HYPERGLYCEMIA, WITHOUT LONG-TERM CURRENT USE OF INSULIN: ICD-10-CM

## 2018-08-28 DIAGNOSIS — E78.00 PURE HYPERCHOLESTEROLEMIA: Chronic | ICD-10-CM

## 2018-08-28 DIAGNOSIS — G70.00 MYASTHENIA GRAVIS: Primary | ICD-10-CM

## 2018-08-28 DIAGNOSIS — Z79.52 CURRENT CHRONIC USE OF SYSTEMIC STEROIDS: ICD-10-CM

## 2018-08-28 PROCEDURE — 99214 OFFICE O/P EST MOD 30 MIN: CPT | Mod: S$GLB,,, | Performed by: STUDENT IN AN ORGANIZED HEALTH CARE EDUCATION/TRAINING PROGRAM

## 2018-08-28 PROCEDURE — 99999 PR PBB SHADOW E&M-EST. PATIENT-LVL V: CPT | Mod: PBBFAC,,, | Performed by: STUDENT IN AN ORGANIZED HEALTH CARE EDUCATION/TRAINING PROGRAM

## 2018-08-28 RX ORDER — SERTRALINE HYDROCHLORIDE 25 MG/1
25 TABLET, FILM COATED ORAL DAILY
Qty: 30 TABLET | Refills: 11 | Status: SHIPPED | OUTPATIENT
Start: 2018-08-28 | End: 2019-08-28

## 2018-08-28 NOTE — PROGRESS NOTES
"Patient Name: Otoniel Candelario  MRN: [id number]    CC: Myasthenia gravis    HPI: Otoniel Candelario is a 67 y.o. male who is new to me, but previously a patient of Dr. Zacarias (see his HPI below with MG details). He last saw Dr. Zacarias in June, and since then has had a thymectomy with some improvement in symptoms. He tolerated the surgery well, but still has trouble with "stamina". He gets easily fatigued in the heat, but feels this might be related to post-op recovery. He denies dysphagia, SOB, focal weakness, ptosis or double vision. He gets cramps in his arms and legs, mostly at night and previous iron studies were normal. He decreased prednisone to 20mg on his own, and only takes mestinon twice a day (morning and before bed).     He is getting IVIG every 5 weeks, and his last treatment was August 15, 16, 17. Prior to surgery, he would start to develop symptoms a few days prior to IVIG. He can't recall if he developed symptoms before the most recent round, and says it would be difficult to tell because he was still recovering from surgery.    He was started on zoloft for low mood surrounding his diagnosis, but feels he has improved. He would like to consider titrating off this.    Diagnosed: March 2017  MG Type: Generalized  Readmission: None  Antibody status: Striated Ab (+), Ach Binding (+), Ach Modul (+)  Thymoma: Thymectomy 7/16/18   Medications: Prednisone 20mg daily (patient decreased from 30mg), Mestinon 60mg BID, IVIG 80g q5 weeks, Imuran 50mg BID  Acute treatments:   Intubated: Never    HPI: Otoniel Candelario is a 66 y.o. male with Pmhx of HTN, DM, HLD, MG (Ach-R ab +) presents to neuromuscular clinic to establish care as he was recently diagnosed with MG in March, 2017. Patient started noticing drooping eyelids ~ 2 years ago. Patient also started experiencing neck weakness, choking on food/water, slurred speech and generalized weakness and fatigue during the end of the day. Patient also complains of diplopia, dyspnea " and blurry vision. Patient referred by Dr. Carlos for further management of MG. Patient has received IVIG in the end of August- states that he started noticing improvement after a week. He reported improvement in chewing and vision. Patient's current regimen is Prednisone 10 mg (down from 20 mg) and timespan 180 mg bid. Patient states that he has felt increasing shortness of breath since decreasing prednisone dose down to 10 mg. Patient endorses chewing issues fatigue, blurriness of vision, swallowing issues with food and water, and lower ext weakness.         EMG- electrographic evidence of decremental response to repetative nerve stimulation    10/10/17: Prednisone 10mg > 30mg; IVIG 80g q3 weeks  3/13/18: Mestinon ER 180mg BID > Mestinon 60mg BID + Mestinon ER 180mg QHS; IVIG 80g q5 weeks  4/10/18: Mestinon ER d/c > Mestinon 60mg TID-QID; start Imuran 50mg BID  6/6/18: No changes to regimen  7/16/18: Thymectomy    ROS:   Review of Systems   Constitutional: +malaise/fatigue. Negative for weight loss.   HENT: Negative for hearing loss.   Eyes: Negative for blurred vision and double vision.   Respiratory: Negative for shortness of breath and stridor.   Cardiovascular: Negative for chest pain and palpitations.   Gastrointestinal: Negative for nausea, vomiting and constipation. +diarrhea  Genitourinary: Negative for frequency. Negative for urgency.   Musculoskeletal: Negative for joint pain. Negative for myalgias and falls.   Skin: Negative for rash.   Neurological: Negative for dizziness and tremors. Negative for focal weakness and seizures.   Endo/Heme/Allergies: Does not bruise/bleed easily.   Psychiatric/Behavioral: Negative for memory loss. Negative for depression and hallucinations. The patient is not nervous/anxious.      Past Medical History  Past Medical History:   Diagnosis Date    Diabetes mellitus     High cholesterol     Hypertension     Myasthenia gravis 03/2017       Medications    Current  Outpatient Medications:     alcohol swabs PadM, Apply 1 each topically 2 hours after meals and at bedtime., Disp: 200 each, Rfl: 0    aspirin (ECOTRIN) 81 MG EC tablet, Take 81 mg by mouth once daily., Disp: , Rfl:     atorvastatin (LIPITOR) 80 MG tablet, Take 80 mg by mouth every evening. , Disp: , Rfl:     azaTHIOprine (IMURAN) 50 mg Tab, Take 1 tablet (50 mg total) by mouth 2 (two) times daily. Start AFTER IVIG infusion (monday) (Patient taking differently: Take 50 mg by mouth 2 (two) times daily. ), Disp: 60 tablet, Rfl: 11    blood sugar diagnostic Strp, Test blood sugar 2 (two) times daily with meals., Disp: 100 strip, Rfl: 0    blood-glucose meter Misc, Use as directed, Disp: 1 each, Rfl: 0    insulin aspart U-100 (NOVOLOG FLEXPEN U-100 INSULIN) 100 unit/mL InPn pen, Inject 3 Units into the skin 3 (three) times daily with meals., Disp: 15 mL, Rfl: 0    insulin detemir U-100 (LEVEMIR FLEXTOUCH) 100 unit/mL (3 mL) SubQ InPn pen, Inject 5 Units into the skin every evening., Disp: 15 mL, Rfl: 0    isosorbide mononitrate (IMDUR) 30 MG 24 hr tablet, Take 30 mg by mouth every morning. , Disp: , Rfl:     lancets Misc, 1 lancet by Misc.(Non-Drug; Combo Route) route 2 (two) times daily with meals., Disp: 100 each, Rfl: 0    lancing device Misc, 1 Device by Misc.(Non-Drug; Combo Route) route 2 (two) times daily with meals., Disp: 1 each, Rfl: 0    lisinopril (PRINIVIL,ZESTRIL) 40 MG tablet, Take 40 mg by mouth 2 (two) times daily. , Disp: , Rfl:     metFORMIN (GLUCOPHAGE) 1000 MG tablet, Take 1,000 mg by mouth 2 (two) times daily with meals., Disp: , Rfl:     omeprazole (PRILOSEC) 20 MG capsule, Take 1 capsule (20 mg total) by mouth once daily. (Patient taking differently: Take 20 mg by mouth every morning. ), Disp: 30 capsule, Rfl: 11    ONETOUCH DELICA LANCETS 30 gauge Misc, TEST BLOOD SUGAR ONCE DAILY, Disp: , Rfl: 0    ONETOUCH ULTRA TEST Strp, TEST BLOOD SUGAR ONCE DAILY, Disp: , Rfl: 12     "ONETOUCH ULTRA2 kit, USE TO TEST BLOOD GLUCOSE DAILY, Disp: , Rfl: 0    pen needle, diabetic 32 gauge x 5/32" Ndle, Test blood sugar 2 hours after meals and at bedtime., Disp: 100 each, Rfl: 0    predniSONE (DELTASONE) 20 MG tablet, Take 1.5 tablets (30 mg total) by mouth once daily. Take 30 mg (1 and a half pills) daily. (Patient taking differently: Take 20 mg by mouth every morning. Take 30 mg (1 and a half pills) daily.), Disp: 45 tablet, Rfl: 5    pyridostigmine (MESTINON) 60 mg Tab, Take 1 tablet (60 mg total) by mouth 4 (four) times daily. Take mestinon 3-4 times per day., Disp: 120 tablet, Rfl: 11    sertraline (ZOLOFT) 25 MG tablet, Take 1 tablet (25 mg total) by mouth once daily., Disp: 30 tablet, Rfl: 11    Allergies  Review of patient's allergies indicates:   Allergen Reactions    Demerol [meperidine] Anxiety     Hyper/ aggitation       Social History  Social History     Socioeconomic History    Marital status:      Spouse name: Not on file    Number of children: Not on file    Years of education: Not on file    Highest education level: Not on file   Social Needs    Financial resource strain: Not on file    Food insecurity - worry: Not on file    Food insecurity - inability: Not on file    Transportation needs - medical: Not on file    Transportation needs - non-medical: Not on file   Occupational History    Not on file   Tobacco Use    Smoking status: Current Some Day Smoker     Years: 10.00     Types: Cigars    Smokeless tobacco: Never Used   Substance and Sexual Activity    Alcohol use: Yes     Comment: social    Drug use: No    Sexual activity: Not on file   Other Topics Concern    Not on file   Social History Narrative    Not on file       Family History  Family History   Problem Relation Age of Onset    Anesthesia problems Neg Hx        Physical Exam  BP (!) 189/105   Pulse 70   Ht 5' 11" (1.803 m)   Wt 102.9 kg (226 lb 13.7 oz)   BMI 31.64 kg/m² "       Constitutional  Well-developed, well-nourished, appears stated age   Neurological    * Mental status      - Orientation  Oriented to person, place, time, and situation     - Attention/concentration  Attentive, vigilant during exam     - Executive  Well-organized thoughts     - Other     * Cranial nerves       - CN II  PERRL     - CN III, IV, VI  Normal pursuits and saccades. Mild weakness of lateral rectus muscles bilaterally     - CN V  Sensation V1 - V3 intact     - CN VII  Face strong, mild right eye ptosis; no fatiguability     - CN VIII  Hearing intact bilaterally     - CN IX, X  Palate raises midline and symmetric     - CN XI  SCM and trapezius 5/5 bilaterally     - CN XII  Tongue midline   * Motor  Muscle bulk normal, strength 5/5 throughout. No fatiguability   * Sensory   Intact to temperature and vibration throughout   * Coordination  No dysmetria with finger-to-nose   * Gait  Normal casual gait   * Deep tendon reflexes  2+ and symmetric throughout     Counting test - 44      Lab and Test Results   Ref. Range 10/10/2017 12:44   Acetylchol Modul Ab Latest Units: % 96 (H)   AChR Binding Ab, Serum Latest Ref Range: <=0.02 nmol/L 66.2 (H)   AChR Ganglionic Neuronal Ab Latest Ref Range: <=0.02 nmol/L 0.00   MG Lambert-Eaton Interpretation Unknown SEE BELOW   MuSK Antibody Test Latest Ref Range: 0.00 - 0.02 nmol/L 0.00   Striated Muscle Ab Latest Ref Range: <1:120 titer Positive 1:18904 (H)         Images: None to review      Assessment and Plan    Otoniel Candelario is a 67 y.o. male with Myasthenia gravis diagnosed in 2017 who is s/p thymectomy (July 2018). He was started on Imuran in April, and continues prednisone (for about 1 year), mestinon and IVIG q5 weeks. Due to his recent surgery and building effect of Imuran, we recommend that he keep his medications stable for now to maintain a steady state. He was educated on the utility of mestinon, and encouraged to use it as needed every 3-4 hours (as tolerated).  "If he cannot tolerate the side effects, he can take 1/2 tablet. If he remains stable at the next follow up, we will consider titrating down the prednisone as this is contributing to hyperglycemia.       Problem List Items Addressed This Visit     Pure hypercholesterolemia (Chronic)    Current Assessment & Plan     On appropriate medications and managed by PCP. We discussed the importance of managing all secondary stroke risk factors, including hyperlipidemia.           Essential hypertension (Chronic)    Current Assessment & Plan     On appropriate medications and managed by PCP. We discussed the importance of managing all secondary stroke risk factors, including hypertension.           Myasthenia gravis - Primary    Overview     Diagnosed: March 2017  MG Type: Generalized  Readmission: None  Antibody status: Striated Ab (+), Ach Binding (+), Ach Modul (+)  Thymoma: Thymectomy 7/16/18   Intubated: Never         Current Assessment & Plan     -- Continue prednisone 20mg daily  -- Mestinon 60mg q3-4hr PRN  -- IVIG 80g q5 weeks  -- Imuran 50mg BID  -- Patient provided with "medications to avoid" list  -- f/u 3 months         Long-term current use of intravenous immunoglobulin (IVIG)    Current Assessment & Plan     -- continue q5 weeks         Type 2 diabetes mellitus    Current Assessment & Plan     On appropriate medications and managed by PCP. We discussed the importance of managing all secondary stroke risk factors, including DM2.           Current chronic use of systemic steroids    Overview     Started around March 2017 - up to 30mg         Current Assessment & Plan     -- continue prednisone 20mg daily for now  -- consider decreasing if stable at next visit               Myasthenia Gravis IMPORTANT INFORMATION:    Elective intubation should be considered if serial measurements of the VC show values less than 20 mL/kg or if the MIF is worse than -30 cmH20.    Medication warning list:          1. Absolute " contraindication   curare,   d-penicillamine,   botulinum toxin,   interferon alpha    2. Contraindicated  a. Antibiotics-- aminoglycosides (gentamycin, kanamycin, neomycin, streptomycin, tobramycine); macrolides (erythromycin, azithromycin (Z-pack), telithromycin, Biaxin)  Fluoroquinolones ( ciprofloxacin, norfloxacin, levofloxacin);  b. quinine, quinidine, procainamide,  c. magnesium salts, iv magnesium replacement.    3. Caution- may exacerbate weakness in some myasthenics  a. Calcium channel blockers  b. Beta blockers  c. Lithium  d. Statins  e. Iodinated contrast agents  F. benzodiazepines        Keara Vanegas MD  Neurology Resident   [facility] [id number] [place] [address]  Pager: [phone]

## 2018-08-28 NOTE — PROGRESS NOTES
Patient Name: Otoniel Candelario  MRN: [id number]    CC: ***    HPI: Otoniel Candelario is a 67 y.o. male ***      ROS:   Review of Systems   Constitutional: Negative for malaise/fatigue. Negative for weight loss.   HENT: Negative for hearing loss.   Eyes: Negative for blurred vision and double vision.   Respiratory: Negative for shortness of breath and stridor.   Cardiovascular: Negative for chest pain and palpitations.   Gastrointestinal: Negative for nausea, vomiting and constipation.   Genitourinary: Negative for frequency. Negative for urgency.   Musculoskeletal: Negative for joint pain. Negative for myalgias and falls.   Skin: Negative for rash.   Neurological: Negative for dizziness and tremors. Negative for focal weakness and seizures.   Endo/Heme/Allergies: Does not bruise/bleed easily.   Psychiatric/Behavioral: Negative for memory loss. Negative for depression and hallucinations. The patient is not nervous/anxious.      Past Medical History  Past Medical History:   Diagnosis Date    Diabetes mellitus     High cholesterol     Hypertension     Myasthenia gravis 03/2017       Medications    Current Outpatient Medications:     alcohol swabs PadM, Apply 1 each topically 2 hours after meals and at bedtime., Disp: 200 each, Rfl: 0    aspirin (ECOTRIN) 81 MG EC tablet, Take 81 mg by mouth once daily., Disp: , Rfl:     atorvastatin (LIPITOR) 80 MG tablet, Take 80 mg by mouth every evening. , Disp: , Rfl:     azaTHIOprine (IMURAN) 50 mg Tab, Take 1 tablet (50 mg total) by mouth 2 (two) times daily. Start AFTER IVIG infusion (monday) (Patient taking differently: Take 50 mg by mouth 2 (two) times daily. ), Disp: 60 tablet, Rfl: 11    blood sugar diagnostic Strp, Test blood sugar 2 (two) times daily with meals., Disp: 100 strip, Rfl: 0    blood-glucose meter Misc, Use as directed, Disp: 1 each, Rfl: 0    insulin aspart U-100 (NOVOLOG FLEXPEN U-100 INSULIN) 100 unit/mL InPn pen, Inject 3 Units into the skin 3 (three) times  "daily with meals., Disp: 15 mL, Rfl: 0    insulin detemir U-100 (LEVEMIR FLEXTOUCH) 100 unit/mL (3 mL) SubQ InPn pen, Inject 5 Units into the skin every evening., Disp: 15 mL, Rfl: 0    isosorbide mononitrate (IMDUR) 30 MG 24 hr tablet, Take 30 mg by mouth every morning. , Disp: , Rfl:     lancets Misc, 1 lancet by Misc.(Non-Drug; Combo Route) route 2 (two) times daily with meals., Disp: 100 each, Rfl: 0    lancing device Misc, 1 Device by Misc.(Non-Drug; Combo Route) route 2 (two) times daily with meals., Disp: 1 each, Rfl: 0    linagliptin (TRADJENTA) 5 mg Tab tablet, Take 5 mg by mouth every morning., Disp: , Rfl:     lisinopril (PRINIVIL,ZESTRIL) 40 MG tablet, Take 40 mg by mouth 2 (two) times daily. , Disp: , Rfl:     metFORMIN (GLUCOPHAGE) 1000 MG tablet, Take 1,000 mg by mouth 2 (two) times daily with meals., Disp: , Rfl:     omeprazole (PRILOSEC) 20 MG capsule, Take 1 capsule (20 mg total) by mouth once daily. (Patient taking differently: Take 20 mg by mouth every morning. ), Disp: 30 capsule, Rfl: 11    ONETOUCH DELICA LANCETS 30 gauge Misc, TEST BLOOD SUGAR ONCE DAILY, Disp: , Rfl: 0    ONETOUCH ULTRA TEST Strp, TEST BLOOD SUGAR ONCE DAILY, Disp: , Rfl: 12    ONETOUCH ULTRA2 kit, USE TO TEST BLOOD GLUCOSE DAILY, Disp: , Rfl: 0    oxyCODONE (ROXICODONE) 10 mg Tab immediate release tablet, Take 1 tablet (10 mg total) by mouth every 4 (four) hours as needed., Disp: 40 tablet, Rfl: 0    pen needle, diabetic 32 gauge x 5/32" Ndle, Test blood sugar 2 hours after meals and at bedtime., Disp: 100 each, Rfl: 0    predniSONE (DELTASONE) 20 MG tablet, Take 1.5 tablets (30 mg total) by mouth once daily. Take 30 mg (1 and a half pills) daily. (Patient taking differently: Take 30 mg by mouth every morning. Take 30 mg (1 and a half pills) daily.), Disp: 45 tablet, Rfl: 5    pyridostigmine (MESTINON) 60 mg Tab, Take 1 tablet (60 mg total) by mouth 4 (four) times daily. Take mestinon 3-4 times per day., " Disp: 120 tablet, Rfl: 11    sertraline (ZOLOFT) 50 MG tablet, TAKE 1/2 TABLET BY MOUTH AT BEDTIME, Disp: 15 tablet, Rfl: 3    Allergies  Review of patient's allergies indicates:   Allergen Reactions    Demerol [meperidine] Anxiety     Hyper/ aggitation       Social History  Social History     Socioeconomic History    Marital status:      Spouse name: Not on file    Number of children: Not on file    Years of education: Not on file    Highest education level: Not on file   Social Needs    Financial resource strain: Not on file    Food insecurity - worry: Not on file    Food insecurity - inability: Not on file    Transportation needs - medical: Not on file    Transportation needs - non-medical: Not on file   Occupational History    Not on file   Tobacco Use    Smoking status: Current Some Day Smoker     Years: 10.00     Types: Cigars    Smokeless tobacco: Never Used   Substance and Sexual Activity    Alcohol use: Yes     Comment: social    Drug use: No    Sexual activity: Not on file   Other Topics Concern    Not on file   Social History Narrative    Not on file       Family History  Family History   Problem Relation Age of Onset    Anesthesia problems Neg Hx        Physical Exam  There were no vitals taken for this visit.      Constitutional  Well-developed, well-nourished, appears stated age   Ophthalmoscopic  No papilledema with no hemorrhages or exudates bilaterally   Cardiovascular  Radial pulses 2+ and symmetric, no LE edema bilaterally   Neurological    * Mental status      - Orientation  Oriented to person, place, time, and situation     - Memory   Intact recent and remote     - Attention/concentration  Attentive, vigilant during exam     - Language  Naming & repetition intact, +2-step commands     - Fund of knowledge  Aware of current events     - Executive  Well-organized thoughts     - Other     * Cranial nerves       - CN II  PERRL, visual fields full to confrontation     - CN  III, IV, VI  Extraocular movements full, normal pursuits and saccades     - CN V  Sensation V1 - V3 intact     - CN VII  Face strong and symmetric bilaterally     - CN VIII  Hearing intact bilaterally     - CN IX, X  Palate raises midline and symmetric     - CN XI  SCM and trapezius 5/5 bilaterally     - CN XII  Tongue midline   * Motor  Muscle bulk normal, strength 5/5 throughout   * Sensory   Intact to temperature and vibration throughout   * Coordination  No dysmetria with finger-to-nose or heel-to-shin   * Gait  See below.   * Deep tendon reflexes  2+ and symmetric throughout   Babinski downgoing bilaterally       Lab and Test Results    WBC   Date Value Ref Range Status   07/19/2018 14.67 (H) 3.90 - 12.70 K/uL Final   07/18/2018 14.84 (H) 3.90 - 12.70 K/uL Final   07/17/2018 12.05 3.90 - 12.70 K/uL Final     Hemoglobin   Date Value Ref Range Status   07/19/2018 12.7 (L) 14.0 - 18.0 g/dL Final   07/18/2018 12.8 (L) 14.0 - 18.0 g/dL Final   07/17/2018 12.8 (L) 14.0 - 18.0 g/dL Final     POC Hematocrit   Date Value Ref Range Status   07/16/2018 34 (L) 36 - 54 %PCV Final   07/16/2018 34 (L) 36 - 54 %PCV Final   07/16/2018 34 (L) 36 - 54 %PCV Final     Hematocrit   Date Value Ref Range Status   07/19/2018 37.6 (L) 40.0 - 54.0 % Final   07/18/2018 39.8 (L) 40.0 - 54.0 % Final   07/17/2018 40.3 40.0 - 54.0 % Final     Platelets   Date Value Ref Range Status   07/19/2018 252 150 - 350 K/uL Final   07/18/2018 231 150 - 350 K/uL Final   07/17/2018 196 150 - 350 K/uL Final     Glucose   Date Value Ref Range Status   07/19/2018 162 (H) 70 - 110 mg/dL Final   07/18/2018 191 (H) 70 - 110 mg/dL Final   07/17/2018 117 (H) 70 - 110 mg/dL Final     Sodium   Date Value Ref Range Status   07/19/2018 134 (L) 136 - 145 mmol/L Final   07/18/2018 133 (L) 136 - 145 mmol/L Final   07/17/2018 138 136 - 145 mmol/L Final     Potassium   Date Value Ref Range Status   07/19/2018 3.9 3.5 - 5.1 mmol/L Final   07/18/2018 5.1 3.5 - 5.1 mmol/L  Final   07/17/2018 4.2 3.5 - 5.1 mmol/L Final     Chloride   Date Value Ref Range Status   07/19/2018 95 95 - 110 mmol/L Final   07/18/2018 92 (L) 95 - 110 mmol/L Final   07/17/2018 100 95 - 110 mmol/L Final     CO2   Date Value Ref Range Status   07/19/2018 29 23 - 29 mmol/L Final   07/18/2018 32 (H) 23 - 29 mmol/L Final   07/17/2018 28 23 - 29 mmol/L Final     BUN, Bld   Date Value Ref Range Status   07/19/2018 13 8 - 23 mg/dL Final   07/18/2018 12 8 - 23 mg/dL Final   07/17/2018 13 8 - 23 mg/dL Final     Creatinine   Date Value Ref Range Status   07/19/2018 0.8 0.5 - 1.4 mg/dL Final   07/18/2018 0.9 0.5 - 1.4 mg/dL Final   07/17/2018 0.8 0.5 - 1.4 mg/dL Final     Calcium   Date Value Ref Range Status   07/19/2018 9.4 8.7 - 10.5 mg/dL Final   07/18/2018 9.5 8.7 - 10.5 mg/dL Final   07/17/2018 9.4 8.7 - 10.5 mg/dL Final     Magnesium   Date Value Ref Range Status   07/19/2018 2.0 1.6 - 2.6 mg/dL Final   07/18/2018 2.0 1.6 - 2.6 mg/dL Final   07/17/2018 2.0 1.6 - 2.6 mg/dL Final     Phosphorus   Date Value Ref Range Status   07/19/2018 2.4 (L) 2.7 - 4.5 mg/dL Final   07/18/2018 3.2 2.7 - 4.5 mg/dL Final   07/17/2018 4.0 2.7 - 4.5 mg/dL Final     Alkaline Phosphatase   Date Value Ref Range Status   04/10/2018 75 55 - 135 U/L Final   03/13/2018 67 55 - 135 U/L Final   10/10/2017 89 55 - 135 U/L Final     ALT   Date Value Ref Range Status   04/10/2018 52 (H) 10 - 44 U/L Final   03/13/2018 34 10 - 44 U/L Final   10/10/2017 39 10 - 44 U/L Final     AST   Date Value Ref Range Status   04/10/2018 37 10 - 40 U/L Final   03/13/2018 31 10 - 40 U/L Final   10/10/2017 29 10 - 40 U/L Final         Images: ***    Independently reviewed {Yes/No (Default):[id number]}    Other Tests     Ref. Range 10/10/2017 12:44   Acetylchol Modul Ab Latest Units: % 96 (H)   AChR Binding Ab, Serum Latest Ref Range: <=0.02 nmol/L 66.2 (H)   AChR Ganglionic Neuronal Ab Latest Ref Range: <=0.02 nmol/L 0.00   MG Lambert-Eaton Interpretation Unknown  SEE BELOW   MuSK Antibody Test Latest Ref Range: 0.00 - 0.02 nmol/L 0.00   Striated Muscle Ab Latest Ref Range: <1:120 titer Positive 1:12803 (H)       Assessment and Plan    Otoniel Candelario is a 67 y.o. male ***            Keara Vanegas MD  Neurology Resident   [facility]  [address] [place] [address]  Pager: [phone]

## 2018-08-29 PROBLEM — E78.00 PURE HYPERCHOLESTEROLEMIA: Chronic | Status: ACTIVE | Noted: 2018-08-29

## 2018-08-29 PROBLEM — I10 ESSENTIAL HYPERTENSION: Chronic | Status: ACTIVE | Noted: 2018-08-29

## 2018-08-29 NOTE — ASSESSMENT & PLAN NOTE
"-- Continue prednisone 20mg daily  -- Mestinon 60mg q3-4hr PRN  -- IVIG 80g q5 weeks  -- Imuran 50mg BID  -- Patient provided with "medications to avoid" list  -- f/u 3 months  "

## 2018-09-10 ENCOUNTER — TELEPHONE (OUTPATIENT)
Dept: NEUROLOGY | Facility: CLINIC | Age: 68
End: 2018-09-10

## 2018-09-10 NOTE — TELEPHONE ENCOUNTER
----- Message from Claudia Hernandez sent at 9/10/2018  9:31 AM CDT -----  Contact: Macario ([facility]) @ [phone] ext [phone]  Calling regarding patient prior authorization forms, asking if the prior authorization forms were received for authorization renewal for IVIG. Please call.

## 2018-10-16 ENCOUNTER — TELEPHONE (OUTPATIENT)
Dept: NEUROLOGY | Facility: CLINIC | Age: 68
End: 2018-10-16

## 2018-10-16 NOTE — TELEPHONE ENCOUNTER
----- Message from Claudia Hernandez sent at 10/16/2018 10:03 AM CDT -----  Contact: Macario  ([facility]) @    calling regarding patient Otoniel Candelario mrn [id number] regarding medications, needing clarifications. Calling to confirm receipt of the order for medication. Please call.

## 2018-10-25 ENCOUNTER — TELEPHONE (OUTPATIENT)
Dept: NEUROLOGY | Facility: CLINIC | Age: 68
End: 2018-10-25

## 2018-11-08 RX ORDER — PYRIDOSTIGMINE BROMIDE 60 MG/1
60 TABLET ORAL 4 TIMES DAILY
Qty: 120 TABLET | Refills: 11 | OUTPATIENT
Start: 2018-11-08 | End: 2019-11-08

## 2018-11-08 RX ORDER — AZATHIOPRINE 50 MG/1
50 TABLET ORAL 2 TIMES DAILY
Qty: 60 TABLET | Refills: 11 | OUTPATIENT
Start: 2018-11-08 | End: 2019-11-08

## 2018-11-12 RX ORDER — PYRIDOSTIGMINE BROMIDE 60 MG/1
60 TABLET ORAL 4 TIMES DAILY
Qty: 120 TABLET | Refills: 11 | Status: SHIPPED | OUTPATIENT
Start: 2018-11-12 | End: 2019-06-11 | Stop reason: SDUPTHER

## 2018-11-12 RX ORDER — AZATHIOPRINE 50 MG/1
50 TABLET ORAL 2 TIMES DAILY
Qty: 180 TABLET | Refills: 3 | Status: SHIPPED | OUTPATIENT
Start: 2018-11-12 | End: 2019-03-13 | Stop reason: SDUPTHER

## 2018-11-27 ENCOUNTER — OFFICE VISIT (OUTPATIENT)
Dept: NEUROLOGY | Facility: CLINIC | Age: 68
End: 2018-11-27
Payer: OTHER GOVERNMENT

## 2018-11-27 VITALS
DIASTOLIC BLOOD PRESSURE: 70 MMHG | SYSTOLIC BLOOD PRESSURE: 132 MMHG | HEART RATE: 84 BPM | HEIGHT: 71 IN | BODY MASS INDEX: 33.36 KG/M2 | WEIGHT: 238.31 LBS

## 2018-11-27 DIAGNOSIS — Z79.52 CURRENT CHRONIC USE OF SYSTEMIC STEROIDS: ICD-10-CM

## 2018-11-27 DIAGNOSIS — G70.00 MYASTHENIA GRAVIS: Primary | ICD-10-CM

## 2018-11-27 PROCEDURE — 99999 PR PBB SHADOW E&M-EST. PATIENT-LVL IV: CPT | Mod: PBBFAC,,, | Performed by: STUDENT IN AN ORGANIZED HEALTH CARE EDUCATION/TRAINING PROGRAM

## 2018-11-27 PROCEDURE — 99214 OFFICE O/P EST MOD 30 MIN: CPT | Mod: S$GLB,,, | Performed by: STUDENT IN AN ORGANIZED HEALTH CARE EDUCATION/TRAINING PROGRAM

## 2018-11-27 RX ORDER — DULAGLUTIDE 1.5 MG/.5ML
INJECTION, SOLUTION SUBCUTANEOUS
Refills: 5 | COMMUNITY
Start: 2018-10-08

## 2018-11-27 RX ORDER — PREDNISONE 20 MG/1
20 TABLET ORAL DAILY
Qty: 90 TABLET | Refills: 0 | Status: SHIPPED | OUTPATIENT
Start: 2018-11-27 | End: 2019-03-30 | Stop reason: SDUPTHER

## 2018-11-27 NOTE — LETTER
November 27, 2018        Win Elliott MD  [address] [facility] [address]             [facility]  [address]  [place] [address]  Phone: [phone]  Fax: [phone]   Patient: Otoniel Candelario   MR Number: [id number]   YOB: 1950   Date of Visit: 11/27/2018     Dear Dr. Elliott,     {WILDCARD:[id number]}    Sincerely,      Keara Vanegas MD            CC  No Recipients    Enclosure

## 2018-11-27 NOTE — PROGRESS NOTES
"Patient Name: Otoniel Candelario  MRN: [id number]    CC: Myasthenia gravis    Diagnosed: March 2017  MG Type: Generalized  Readmission: None  Antibody status: Striated Ab (+), Ach Binding (+), Ach Modul (+)  Thymoma: Thymectomy 7/16/18   Medications: Prednisone 20mg daily (patient decreased from 30mg), Mestinon 60mg BID, IVIG 80g q5 weeks, Imuran 50mg BID (start 4/10/18)  Acute treatments:   Intubated: Never    HPI: Otoniel Candelario is a 68 y.o. male who returns for follow up. He has recovered well from his thymectomy and doesn't feel as short of breath or fatigued. He says he doesn't really develop symptoms before his IVIG is due anymore. He is scheduled for a 3 day treatment starting tomorrow which is 1 week late, and denies any symptoms. His only symptom at this time is fatigue, which improves with rest. Will infrequently have some swallowing trouble (only when he drinks to much water), and mild shortness of breath. Sometimes his right eyelid looks "lazy" but no full ptosis. No issues with his left eye which was initially more symptomatic.    My first visit 8/28/18:  Otoniel Candelario is a 68 y.o. Male who is new to me, but previously a patient of Dr. Zacarias (see his HPI below with MG details). He last saw Dr. Zacarias in June, and since then has had a thymectomy with some improvement in symptoms. He tolerated the surgery well, but still has trouble with "stamina". He gets easily fatigued in the heat, but feels this might be related to post-op recovery. He denies dysphagia, SOB, focal weakness, ptosis or double vision. He gets cramps in his arms and legs, mostly at night and previous iron studies were normal. He decreased prednisone to 20mg on his own, and only takes mestinon twice a day (morning and before bed).     He is getting IVIG every 5 weeks, and his last treatment was August 15, 16, 17. Prior to surgery, he would start to develop symptoms a few days prior to IVIG. He can't recall if he developed symptoms before the most " recent round, and says it would be difficult to tell because he was still recovering from surgery.    He was started on zoloft for low mood surrounding his diagnosis, but feels he has improved. He would like to consider titrating off this.    HPI: Otoniel Candelario is a 66 y.o. male with Pmhx of HTN, DM, HLD, MG (Ach-R ab +) presents to neuromuscular clinic to establish care as he was recently diagnosed with MG in March, 2017. Patient started noticing drooping eyelids ~ 2 years ago. Patient also started experiencing neck weakness, choking on food/water, slurred speech and generalized weakness and fatigue during the end of the day. Patient also complains of diplopia, dyspnea and blurry vision. Patient referred by Dr. Carlos for further management of MG. Patient has received IVIG in the end of August- states that he started noticing improvement after a week. He reported improvement in chewing and vision. Patient's current regimen is Prednisone 10 mg (down from 20 mg) and timespan 180 mg bid. Patient states that he has felt increasing shortness of breath since decreasing prednisone dose down to 10 mg. Patient endorses chewing issues fatigue, blurriness of vision, swallowing issues with food and water, and lower ext weakness.         10/10/17: Prednisone 10mg > 30mg; IVIG 80g q3 weeks  3/13/18: Mestinon ER 180mg BID > Mestinon 60mg BID + Mestinon ER 180mg QHS; IVIG 80g q5 weeks  4/10/18: Mestinon ER d/c > Mestinon 60mg TID-QID; start Imuran 50mg BID  6/6/18: No changes to regimen  7/16/18: Thymectomy  8/28/18: No changes to regimen (prednisone 20mg, imuran 50mg BID, IVIG 80g q5w)    ROS:   Review of Systems   Constitutional: +malaise/fatigue. Negative for weight loss.   HENT: Negative for hearing loss.   Eyes: Negative for blurred vision and double vision.   Respiratory: Negative for shortness of breath and stridor.   Cardiovascular: Negative for chest pain and palpitations.   Gastrointestinal: Negative for nausea,  vomiting and constipation. +diarrhea  Neurological: Negative for dizziness and tremors. Negative for focal weakness and seizures.    Psychiatric/Behavioral: Negative for memory loss. Negative for depression and hallucinations. The patient is not nervous/anxious.    Medications    Current Outpatient Medications:     alcohol swabs PadM, Apply 1 each topically 2 hours after meals and at bedtime., Disp: 200 each, Rfl: 0    aspirin (ECOTRIN) 81 MG EC tablet, Take 81 mg by mouth once daily., Disp: , Rfl:     atorvastatin (LIPITOR) 80 MG tablet, Take 80 mg by mouth every evening. , Disp: , Rfl:     azaTHIOprine (IMURAN) 50 mg Tab, Take 1 tablet (50 mg total) by mouth 2 (two) times daily., Disp: 180 tablet, Rfl: 3    blood sugar diagnostic Strp, Test blood sugar 2 (two) times daily with meals., Disp: 100 strip, Rfl: 0    blood-glucose meter Misc, Use as directed, Disp: 1 each, Rfl: 0    insulin aspart U-100 (NOVOLOG FLEXPEN U-100 INSULIN) 100 unit/mL InPn pen, Inject 3 Units into the skin 3 (three) times daily with meals., Disp: 15 mL, Rfl: 0    insulin detemir U-100 (LEVEMIR FLEXTOUCH) 100 unit/mL (3 mL) SubQ InPn pen, Inject 5 Units into the skin every evening., Disp: 15 mL, Rfl: 0    isosorbide mononitrate (IMDUR) 30 MG 24 hr tablet, Take 30 mg by mouth every morning. , Disp: , Rfl:     lancets Misc, 1 lancet by Misc.(Non-Drug; Combo Route) route 2 (two) times daily with meals., Disp: 100 each, Rfl: 0    lancing device Misc, 1 Device by Misc.(Non-Drug; Combo Route) route 2 (two) times daily with meals., Disp: 1 each, Rfl: 0    lisinopril (PRINIVIL,ZESTRIL) 40 MG tablet, Take 40 mg by mouth 2 (two) times daily. , Disp: , Rfl:     metFORMIN (GLUCOPHAGE) 1000 MG tablet, Take 1,000 mg by mouth 2 (two) times daily with meals., Disp: , Rfl:     omeprazole (PRILOSEC) 20 MG capsule, Take 1 capsule (20 mg total) by mouth once daily. (Patient taking differently: Take 20 mg by mouth every morning. ), Disp: 30 capsule,  "Rfl: 11    ONETOUCH DELICA LANCETS 30 gauge Misc, TEST BLOOD SUGAR ONCE DAILY, Disp: , Rfl: 0    ONETOUCH ULTRA TEST Strp, TEST BLOOD SUGAR ONCE DAILY, Disp: , Rfl: 12    ONETOUCH ULTRA2 kit, USE TO TEST BLOOD GLUCOSE DAILY, Disp: , Rfl: 0    pen needle, diabetic 32 gauge x 5/32" Ndle, Test blood sugar 2 hours after meals and at bedtime., Disp: 100 each, Rfl: 0    predniSONE (DELTASONE) 20 MG tablet, Take 1.5 tablets (30 mg total) by mouth once daily. Take 30 mg (1 and a half pills) daily. (Patient taking differently: Take 20 mg by mouth every morning. Take 30 mg (1 and a half pills) daily.), Disp: 45 tablet, Rfl: 5    pyridostigmine (MESTINON) 60 mg Tab, Take 1 tablet (60 mg total) by mouth 4 (four) times daily. Take mestinon 3-4 times per day., Disp: 120 tablet, Rfl: 11    sertraline (ZOLOFT) 25 MG tablet, Take 1 tablet (25 mg total) by mouth once daily., Disp: 30 tablet, Rfl: 11      Physical Exam  /70   Pulse 84   Ht 5' 11" (1.803 m)   Wt 108.1 kg (238 lb 5.1 oz)   BMI 33.24 kg/m²       Constitutional  Well-developed, well-nourished, appears stated age   Neurological    * Mental status      - Orientation  Oriented to person, place, time, and situation     - Attention/concentration  Attentive, vigilant during exam     - Executive  Well-organized thoughts     - Other     * Cranial nerves       - CN II  PERRL     - CN III, IV, VI  Normal pursuits and saccades EOMI     - CN V  Sensation V1 - V3 intact     - CN VII  Face strong and symmetric. No ptosis.     - CN VIII  Not tested     - CN IX, X  Palate raises midline and symmetric     - CN XI  SCM and trapezius 5/5 bilaterally     - CN XII  Tongue midline   * Motor  Muscle bulk normal, strength 5/5 throughout. No fatiguability   * Sensory   Intact to temperature and vibration throughout   * Coordination  No dysmetria with finger-to-nose   * Gait  Normal casual gait   * Deep tendon reflexes  2+ and symmetric throughout         Lab and Test Results   " Ref. Range 10/10/2017 12:44   Acetylchol Modul Ab Latest Units: % 96 (H)   AChR Binding Ab, Serum Latest Ref Range: <=0.02 nmol/L 66.2 (H)   AChR Ganglionic Neuronal Ab Latest Ref Range: <=0.02 nmol/L 0.00   MG Lambert-Eaton Interpretation Unknown SEE BELOW   MuSK Antibody Test Latest Ref Range: 0.00 - 0.02 nmol/L 0.00   Striated Muscle Ab Latest Ref Range: <1:120 titer Positive 1:79918 (H)       EMG- electrographic evidence of decremental response to repetative nerve stimulation    Images: None to review      Assessment and Plan    Otoniel Candelario is a 68 y.o. male with Myasthenia gravis diagnosed in 2017 who is s/p thymectomy (July 2018). He was started on Imuran in April, and continues prednisone (for about 1 year), mestinon and IVIG q5 weeks. We will decrease his prednisone as below, and continue mestinon and IVIG every 5 weeks. At the next appointment we will discuss extending IVIG interval. Due to long term steroid use, I will order a vitamin D today and recommend that he follows up with his primary care for bone density surveillance.      Problem List Items Addressed This Visit        Neuro    Myasthenia gravis - Primary    Overview     Diagnosed: March 2017  MG Type: Generalized  Readmission: None  Antibody status: Striated Ab (+), Ach Binding (+), Ach Modul (+)  Thymoma: Thymectomy 7/16/18   Intubated: Never         Current Assessment & Plan     -- Continue mestinon - meant to be PRN but patient taking scheduled. Encouraged to limit to PRN, and recommended taking extra during episodes of fatigue  -- Continue IVIG - will increase to 1g/kg divided over 3 days ([facility])  -- Decrease prednisone:   - 15mg per day for 4 weeks   - Then 10mg per day thereafter         Relevant Medications    predniSONE (DELTASONE) 20 MG tablet    Other Relevant Orders    VITAMIN D (Completed)       Other    Current chronic use of systemic steroids    Overview     Started around March 2017 - up to  30mg         Current Assessment & Plan     -- Vit D level today  -- Recommend discussing bone density surveillance with primary care         Relevant Orders    Comprehensive metabolic panel (Completed)      Other Visit Diagnoses     Body mass index (BMI) of 33.0-33.9 in adult         Relevant Orders    VITAMIN D (Completed)          Myasthenia Gravis IMPORTANT INFORMATION:    Elective intubation should be considered if serial measurements of the VC show values less than 20 mL/kg or if the MIF is worse than -30 cmH20.    Medication warning list:          1. Absolute contraindication   curare,   d-penicillamine,   botulinum toxin,   interferon alpha    2. Contraindicated  a. Antibiotics-- aminoglycosides (gentamycin, kanamycin, neomycin, streptomycin, tobramycine); macrolides (erythromycin, azithromycin (Z-pack), telithromycin, Biaxin)  Fluoroquinolones ( ciprofloxacin, norfloxacin, levofloxacin);  b. quinine, quinidine, procainamide,  c. magnesium salts, iv magnesium replacement.    3. Caution- may exacerbate weakness in some myasthenics  a. Calcium channel blockers  b. Beta blockers  c. Lithium  d. Statins  e. Iodinated contrast agents  F. benzodiazepines        Keara Vanegas MD  Neurology Resident   [facility] [address] [address]  Pager: [phone]

## 2018-11-28 NOTE — ASSESSMENT & PLAN NOTE
-- Continue mestinon - meant to be PRN but patient taking scheduled. Encouraged to limit to PRN, and recommended taking extra during episodes of fatigue  -- Continue IVIG - will increase to 1g/kg divided over 3 days ([facility])  -- Decrease prednisone:   - 15mg per day for 4 weeks   - Then 10mg per day thereafter

## 2018-11-30 RX ORDER — OMEPRAZOLE 20 MG/1
CAPSULE, DELAYED RELEASE ORAL
Qty: 30 CAPSULE | Refills: 10 | Status: SHIPPED | OUTPATIENT
Start: 2018-11-30 | End: 2019-06-11

## 2018-12-03 ENCOUNTER — TELEPHONE (OUTPATIENT)
Dept: NEUROLOGY | Facility: CLINIC | Age: 68
End: 2018-12-03

## 2018-12-03 NOTE — TELEPHONE ENCOUNTER
----- Message from Miguel Angel Jenkins sent at 12/3/2018 11:06 AM CST -----  Contact: Pt. [phone]  Needs Advice    Reason for call:The patient would like to speak to someone regarding his medication. Please contact the patient to discuss further.          Communication Preference:PHONE     Additional Information:

## 2018-12-04 ENCOUNTER — TELEPHONE (OUTPATIENT)
Dept: NEUROLOGY | Facility: CLINIC | Age: 68
End: 2018-12-04

## 2018-12-04 NOTE — TELEPHONE ENCOUNTER
----- Message from Jethro Juan sent at 12/4/2018 12:32 PM CST -----  Needs Advice    Reason for call: Pt is asking to speak w/ the nurse about changing medication for infusion        Communication Preference: [phone]    Additional Information:

## 2018-12-16 DIAGNOSIS — G70.00 MYASTHENIA GRAVIS: ICD-10-CM

## 2018-12-16 RX ORDER — PREDNISONE 20 MG/1
TABLET ORAL
Qty: 45 TABLET | Refills: 5 | OUTPATIENT
Start: 2018-12-16

## 2019-01-07 ENCOUNTER — TELEPHONE (OUTPATIENT)
Dept: NEUROLOGY | Facility: CLINIC | Age: 69
End: 2019-01-07

## 2019-01-07 ENCOUNTER — TELEPHONE (OUTPATIENT)
Dept: CARDIOTHORACIC SURGERY | Facility: CLINIC | Age: 69
End: 2019-01-07

## 2019-01-07 NOTE — TELEPHONE ENCOUNTER
Patient would like to know if your going to change his medication. He states you may want to put him on something stronger due to his weight. Wants to make sure it gets to the pharmacy.

## 2019-01-07 NOTE — TELEPHONE ENCOUNTER
----- Message from Bianca Cordova sent at 1/7/2019  9:44 AM CST -----  Contact: self @ [phone]  Pt would like to speak with someone asap concerning his medication.  Pls call.

## 2019-01-07 NOTE — TELEPHONE ENCOUNTER
Hi!! Spoke to the patient about the script being faxed. He states that he doesn't know if he is going to get more bottles of his treatment or what he needs to tell the pharmacy? Please advise patient.

## 2019-01-07 NOTE — TELEPHONE ENCOUNTER
Spoke with patient to confirm IVIG order of 1g/kg divided over 3 days. He will confirm with pharmacy and return call if there are issues with the updated order.

## 2019-01-23 ENCOUNTER — TELEPHONE (OUTPATIENT)
Dept: ADMINISTRATIVE | Facility: CLINIC | Age: 69
End: 2019-01-23

## 2019-01-28 ENCOUNTER — TELEPHONE (OUTPATIENT)
Dept: NEUROLOGY | Facility: CLINIC | Age: 69
End: 2019-01-28

## 2019-01-28 NOTE — TELEPHONE ENCOUNTER
----- Message from Claudia Hernandez sent at 1/28/2019  1:26 PM CST -----  Contact: pt@ [phone]  Calling to speak with someone in Dr. Almeida's office regarding him getting his infusion medication. Says his pharmacy has been calling but have not been able to get the new prescription. Please call patient when done.

## 2019-01-29 ENCOUNTER — TELEPHONE (OUTPATIENT)
Dept: NEUROLOGY | Facility: CLINIC | Age: 69
End: 2019-01-29

## 2019-01-29 NOTE — TELEPHONE ENCOUNTER
Returned patient call to let him know we received his prior authorization. Unable to leave a message due to the voicemail not being set up.

## 2019-01-29 NOTE — TELEPHONE ENCOUNTER
----- Message from Jessica Balderrama sent at 1/29/2019  2:02 PM CST -----  Contact: Pt  Patient Returning Call from [facility]    Who Left Message for Patient: Martha   Communication Preference: # [phone]  Additional Information:

## 2019-01-29 NOTE — TELEPHONE ENCOUNTER
----- Message from Claudia Hernandez sent at 1/29/2019  1:11 PM CST -----  Needs Advice    Reason for call:calling regarding his prescription for medication: Gamunex C, says that the pharmacy is needing prior authorization, says that Dr. Almeida was to change the medication but he is not sure. Patient says he needs to get the medication. Please call says he needs the medication so he can have his infusion next week.         Communication Preference:pt@ [phone]    Additional Information:

## 2019-01-31 ENCOUNTER — TELEPHONE (OUTPATIENT)
Dept: NEUROLOGY | Facility: CLINIC | Age: 69
End: 2019-01-31

## 2019-01-31 NOTE — TELEPHONE ENCOUNTER
----- Message from Bubba Srivastava sent at 1/31/2019  8:28 AM CST -----  Contact: [facility] @ [phone] opt 1  Caller calling to get a prior authorization for ( GAMUNEX 800ml ) 35day supply pls call [phone] to obtain a verbal authorization.

## 2019-01-31 NOTE — TELEPHONE ENCOUNTER
"Returned call to [facility], verbal PA done to try to avoid need for monthly PA's. Since no mention of "refractory" symptoms in last visit note, will continue with monthly PA's. PA will be approved and approval to be faxed to office.   "

## 2019-02-07 ENCOUNTER — TELEPHONE (OUTPATIENT)
Dept: NEUROLOGY | Facility: CLINIC | Age: 69
End: 2019-02-07

## 2019-02-07 NOTE — TELEPHONE ENCOUNTER
----- Message from Bubba Srivastava sent at 2/7/2019 10:12 AM CST -----  Contact: Aye ( infusion Nurse ) @ [phone] or Patient @ [phone]  Caller requesting a return call regarding the wrong dosage for ( GAMUNEX C ) pls call to discuss

## 2019-02-13 ENCOUNTER — TELEPHONE (OUTPATIENT)
Dept: NEUROLOGY | Facility: CLINIC | Age: 69
End: 2019-02-13

## 2019-02-13 NOTE — TELEPHONE ENCOUNTER
----- Message from Bubba Srivastava sent at 2/13/2019  9:30 AM CST -----  Contact: [facility] @ [phone] opt 1  Caller requesting a return call w verification on the script for ( GAMUNEX ) pls call to discuss

## 2019-02-13 NOTE — TELEPHONE ENCOUNTER
----- Message from Maria E Eddy sent at 2019  9:05 AM CST -----  Contact: [facility]   Needs Advice    Reason for call: Pharmacy is calling to speak with the nurse they need to give a verbal order over the phone for the pts medication Gamunex IV they need this urgent the thing is with the pts orders they need to get them out asap Pts orders get delayed if the pts prescription has changed they would need a new prescription called in.  If the pts prescription is different they do need updated orders on the pts medication.  For the future to prevent any escalations or delays pts insurance plan requires the pt to get prior auths every month they  on March 3,2019 the prior auth can be done verbally over the phone you can call at [phone] the call doesn't have to be made know not until March 3,2019         Communication Preference: can you please call [facility] at [phone] option number 1     Additional Information: none    WANG

## 2019-02-18 ENCOUNTER — TELEPHONE (OUTPATIENT)
Dept: NEUROLOGY | Facility: CLINIC | Age: 69
End: 2019-02-18

## 2019-02-18 NOTE — TELEPHONE ENCOUNTER
Returned call, nothing needed at this time, new PA will be due 03/2019 and new script will be due 04/2019.

## 2019-02-26 ENCOUNTER — HOSPITAL ENCOUNTER (OUTPATIENT)
Dept: RADIOLOGY | Facility: HOSPITAL | Age: 69
Discharge: HOME | End: 2019-02-26
Attending: THORACIC SURGERY (CARDIOTHORACIC VASCULAR SURGERY)
Payer: OTHER GOVERNMENT

## 2019-02-26 DIAGNOSIS — G70.00 MYASTHENIA GRAVIS: ICD-10-CM

## 2019-02-26 PROCEDURE — 71250 CT CHEST WITHOUT CONTRAST: ICD-10-PCS | Mod: 26,,, | Performed by: RADIOLOGY

## 2019-02-26 PROCEDURE — 71250 CT THORAX DX C-: CPT | Mod: 26,,, | Performed by: RADIOLOGY

## 2019-02-26 PROCEDURE — 71250 CT THORAX DX C-: CPT | Mod: TC

## 2019-02-26 RX ORDER — PYRIDOSTIGMINE BROMIDE 60 MG/1
60 TABLET ORAL 4 TIMES DAILY
Qty: 120 TABLET | Refills: 11 | Status: CANCELLED | OUTPATIENT
Start: 2019-02-26 | End: 2020-02-26

## 2019-02-26 NOTE — TELEPHONE ENCOUNTER
Spoke with the patient regarding his IVIG orders. [facility] is sending him his old dose (80g per day for 3 days) rather than the updated dose (35g per day for 3 days). He says his nurse is aware of the updated order so he has received the appropriate dose. I've tried reaching out to [facility] in the past with no success, but will work on clarifying orders.

## 2019-02-26 NOTE — PROGRESS NOTES
"Subjective:       Patient ID: Otoniel Candelario is a 68 y.o. male.    Chief Complaint: Follow-up    Diagnosis:  Myasthenia Gravis     Pre-operative therapy: Medical magement    Procedure(s) and date(s): 7/16/18- Attempted robotic converted to median sternotomy for thymectomy.     Pathology: Benign thymic tissue. Small fragment of parathyroid tissue, less than 1 mm.     Post-operative therapy: NA    HPI   68 y.o. male returns for 6 month follow up s/p median sternotomy for thymectomy on 7/16/18. Uncomplicated post op course. Continues to follow with neurology for management of myasthenia symptoms. Over the last 6 months maintenance medications have decreased. Now on Prednisone 10mg daily (previously 20mg daily), Mestinon 60mg BID, IVIG 35g q5 weeks (previously 80g q5 weeks). Today he reports feeling well. Endorses reflux. Denies generalized weakness, fatigue, ptosis, slurred speech, and choking on liquids. Denies fever, SOB, CP, N/V or changes in bowel and bladder functioning.      Review of Systems   Constitutional: Negative for activity change, appetite change, diaphoresis and fever.   HENT: Negative for congestion, trouble swallowing and voice change.    Eyes: Negative for visual disturbance.   Respiratory: Negative for chest tightness, shortness of breath and wheezing.    Cardiovascular: Negative for chest pain, palpitations and leg swelling.   Gastrointestinal: Negative for abdominal distention, diarrhea, nausea and vomiting.        Heartburn   Genitourinary: Negative for decreased urine volume and difficulty urinating.   Musculoskeletal: Negative for arthralgias.   Neurological: Negative for dizziness, seizures, facial asymmetry, weakness and light-headedness.   Psychiatric/Behavioral: Negative for agitation.         Objective:       Vitals:    02/27/19 0931   BP: 133/81   Pulse: 75   SpO2: 96%   Weight: 112.1 kg (247 lb 2.2 oz)   Height: 5' 11" (1.803 m)   PainSc: 0-No pain       Physical Exam   Constitutional: He " is oriented to person, place, and time. He appears well-developed and well-nourished.   HENT:   Head: Normocephalic.   Mouth/Throat: Oropharynx is clear and moist.   Eyes: Pupils are equal, round, and reactive to light.   Neck: Normal range of motion. Neck supple. No thyromegaly present.   Cardiovascular: Normal rate and regular rhythm.   Murmur heard.  Median sternotomy well healed. No clicking or sternal instability.     Pulmonary/Chest: Effort normal and breath sounds normal. No respiratory distress. He exhibits no tenderness.   Abdominal: Soft. Bowel sounds are normal. He exhibits no distension. There is no tenderness.   Musculoskeletal: Normal range of motion. He exhibits no edema.   Lymphadenopathy:     He has no cervical adenopathy.   Neurological: He is alert and oriented to person, place, and time.   Skin: Skin is warm and dry.   Psychiatric: He has a normal mood and affect. Thought content normal.       8/3/18 CXR- Postoperative changes in the thorax as before.  Heart size normal.  No significant airspace consolidation or pleural effusion identified    2/27/19- Chest CT-   Postoperative changes of median sternotomy for thymectomy with small amount of simple appearing fluid within the resection bed.  There is fusiform dilatation of the ascending and descending thoracic aorta.  Moderate aortic and coronary artery atherosclerotic calcification.  Small lipoma within the left flank.    Assessment:       68 year old male returns for follow up s/p median sternotomy for thymectomy on 7/16/18.       Plan:       Doing well. Decreasing requirements in medical management of MG symptoms. Sternum well healed. CT reviewed. RTC prn.

## 2019-02-27 ENCOUNTER — OFFICE VISIT (OUTPATIENT)
Dept: CARDIOTHORACIC SURGERY | Facility: CLINIC | Age: 69
End: 2019-02-27
Attending: THORACIC SURGERY (CARDIOTHORACIC VASCULAR SURGERY)
Payer: OTHER GOVERNMENT

## 2019-02-27 ENCOUNTER — PATIENT MESSAGE (OUTPATIENT)
Dept: NEUROLOGY | Facility: CLINIC | Age: 69
End: 2019-02-27

## 2019-02-27 VITALS
WEIGHT: 247.13 LBS | BODY MASS INDEX: 34.6 KG/M2 | OXYGEN SATURATION: 96 % | HEIGHT: 71 IN | HEART RATE: 75 BPM | SYSTOLIC BLOOD PRESSURE: 133 MMHG | DIASTOLIC BLOOD PRESSURE: 81 MMHG

## 2019-02-27 DIAGNOSIS — G70.00 MYASTHENIA GRAVIS: Primary | ICD-10-CM

## 2019-02-27 DIAGNOSIS — Z90.89 S/P THYMECTOMY: ICD-10-CM

## 2019-02-27 PROCEDURE — 99999 PR PBB SHADOW E&M-EST. PATIENT-LVL III: CPT | Mod: PBBFAC,,, | Performed by: THORACIC SURGERY (CARDIOTHORACIC VASCULAR SURGERY)

## 2019-02-27 PROCEDURE — 99213 OFFICE O/P EST LOW 20 MIN: CPT | Mod: S$GLB,,, | Performed by: THORACIC SURGERY (CARDIOTHORACIC VASCULAR SURGERY)

## 2019-02-27 PROCEDURE — 99213 PR OFFICE/OUTPT VISIT, EST, LEVL III, 20-29 MIN: ICD-10-PCS | Mod: S$GLB,,, | Performed by: THORACIC SURGERY (CARDIOTHORACIC VASCULAR SURGERY)

## 2019-02-27 PROCEDURE — 99999 PR PBB SHADOW E&M-EST. PATIENT-LVL III: ICD-10-PCS | Mod: PBBFAC,,, | Performed by: THORACIC SURGERY (CARDIOTHORACIC VASCULAR SURGERY)

## 2019-02-27 RX ORDER — DAPAGLIFLOZIN 5 MG/1
5 TABLET, FILM COATED ORAL DAILY
Refills: 5 | COMMUNITY
Start: 2018-11-29 | End: 2022-03-31 | Stop reason: HOSPADM

## 2019-02-27 RX ORDER — ALFUZOSIN HYDROCHLORIDE 10 MG/1
10 TABLET, EXTENDED RELEASE ORAL DAILY
Refills: 6 | COMMUNITY
Start: 2019-02-21 | End: 2020-03-10 | Stop reason: SDUPTHER

## 2019-02-27 NOTE — TELEPHONE ENCOUNTER
Spoke with [facility] pharmacy who did not have updated prescription on file. Prescription was updated to:    Gammunex 10% 35g daily for 3 days every 5 weeks    The updated prescription will be faxed to [phone]

## 2019-03-08 RX ORDER — PYRIDOSTIGMINE BROMIDE 60 MG/1
60 TABLET ORAL 4 TIMES DAILY PRN
Qty: 180 TABLET | Refills: 4 | Status: SHIPPED | OUTPATIENT
Start: 2019-03-08 | End: 2021-03-03

## 2019-03-13 DIAGNOSIS — D84.9 IMMUNOSUPPRESSION: ICD-10-CM

## 2019-03-13 DIAGNOSIS — G70.00 MYASTHENIA GRAVIS: Primary | ICD-10-CM

## 2019-03-13 RX ORDER — AZATHIOPRINE 50 MG/1
50 TABLET ORAL 2 TIMES DAILY
Qty: 180 TABLET | Refills: 0 | Status: SHIPPED | OUTPATIENT
Start: 2019-03-13 | End: 2019-06-03 | Stop reason: SDUPTHER

## 2019-03-13 NOTE — TELEPHONE ENCOUNTER
----- Message from Miguel Angel Jenkins sent at 3/13/2019  9:05 AM CDT -----  Contact: Pt.[phone]  Rx Refill/Request     Refill Rx:      Rx Name and Strength:  azaTHIOprine (IMURAN) 50 mg     Preferred Pharmacy with phone number:     [facility]/pharmacy #[id number] - [place] - [address]  [address] [place] [address]  Phone: [phone] Fax: [phone]      Communication Preference:PHONE    Additional Information:

## 2019-03-30 DIAGNOSIS — G70.00 MYASTHENIA GRAVIS: ICD-10-CM

## 2019-03-31 RX ORDER — PREDNISONE 10 MG/1
10 TABLET ORAL DAILY
Qty: 60 TABLET | Refills: 0 | Status: SHIPPED | OUTPATIENT
Start: 2019-03-31 | End: 2019-04-09

## 2019-04-09 ENCOUNTER — OFFICE VISIT (OUTPATIENT)
Dept: NEUROLOGY | Facility: CLINIC | Age: 69
End: 2019-04-09
Payer: OTHER GOVERNMENT

## 2019-04-09 VITALS
HEART RATE: 79 BPM | BODY MASS INDEX: 34.47 KG/M2 | SYSTOLIC BLOOD PRESSURE: 146 MMHG | HEIGHT: 71 IN | DIASTOLIC BLOOD PRESSURE: 84 MMHG

## 2019-04-09 DIAGNOSIS — G70.00 MYASTHENIA GRAVIS: Primary | ICD-10-CM

## 2019-04-09 DIAGNOSIS — Z79.899 LONG-TERM CURRENT USE OF INTRAVENOUS IMMUNOGLOBULIN (IVIG): ICD-10-CM

## 2019-04-09 PROCEDURE — 99999 PR PBB SHADOW E&M-EST. PATIENT-LVL IV: CPT | Mod: PBBFAC,,, | Performed by: STUDENT IN AN ORGANIZED HEALTH CARE EDUCATION/TRAINING PROGRAM

## 2019-04-09 PROCEDURE — 99214 OFFICE O/P EST MOD 30 MIN: CPT | Mod: S$GLB,,, | Performed by: NEUROLOGICAL SURGERY

## 2019-04-09 PROCEDURE — 99999 PR PBB SHADOW E&M-EST. PATIENT-LVL IV: ICD-10-PCS | Mod: PBBFAC,,, | Performed by: STUDENT IN AN ORGANIZED HEALTH CARE EDUCATION/TRAINING PROGRAM

## 2019-04-09 PROCEDURE — 99214 PR OFFICE/OUTPT VISIT, EST, LEVL IV, 30-39 MIN: ICD-10-PCS | Mod: S$GLB,,, | Performed by: NEUROLOGICAL SURGERY

## 2019-04-09 RX ORDER — PREDNISONE 10 MG/1
5 TABLET ORAL DAILY
Qty: 45 TABLET | Refills: 0 | Status: SHIPPED | OUTPATIENT
Start: 2019-04-09 | End: 2019-10-07

## 2019-04-09 NOTE — PROGRESS NOTES
"Patient Name: Otoniel Candelario  MRN: [id number]    CC: Myasthenia gravis    Diagnosed: March 2017  MG Type: Generalized  Readmission: None  Antibody status: Striated Ab (+), Ach Binding (+), Ach Modul (+)  Thymoma: Thymectomy 7/16/18   Medications: Prednisone 10mg daily (max 30mg), Mestinon 60mg BID, IVIG 1g/kg q5 weeks, Imuran 50mg BID (start 4/10/18)  Acute treatments: None  Intubated: Never    Interval history: Otoniel Candelario is a 68 y.o. male who returns for follow up. He reports that he is doing very well with no focal symptoms of MG. He does get fatigued occasionally, but it resolves with rest. He denies any new symptoms.    Interval history 11/27/18: Otoniel Candelario is a 68 y.o. male who returns for follow up. He has recovered well from his thymectomy and doesn't feel as short of breath or fatigued. He says he doesn't really develop symptoms before his IVIG is due anymore. He is scheduled for a 3 day treatment starting tomorrow which is 1 week late, and denies any symptoms. His only symptom at this time is fatigue, which improves with rest. Will infrequently have some swallowing trouble (only when he drinks to much water), and mild shortness of breath. Sometimes his right eyelid looks "lazy" but no full ptosis. No issues with his left eye which was initially more symptomatic.    My first visit 8/28/18:  Otoniel Candelario is a 68 y.o. Male who is new to me, but previously a patient of Dr. Zacarias (see his HPI below with MG details). He last saw Dr. Zacarias in June, and since then has had a thymectomy with some improvement in symptoms. He tolerated the surgery well, but still has trouble with "stamina". He gets easily fatigued in the heat, but feels this might be related to post-op recovery. He denies dysphagia, SOB, focal weakness, ptosis or double vision. He gets cramps in his arms and legs, mostly at night and previous iron studies were normal. He decreased prednisone to 20mg on his own, and only takes mestinon twice a day " (morning and before bed).     He is getting IVIG every 5 weeks, and his last treatment was August 15, 16, 17. Prior to surgery, he would start to develop symptoms a few days prior to IVIG. He can't recall if he developed symptoms before the most recent round, and says it would be difficult to tell because he was still recovering from surgery.    He was started on zoloft for low mood surrounding his diagnosis, but feels he has improved. He would like to consider titrating off this.    HPI: Otoniel Candelario is a 66 y.o. male with Pmhx of HTN, DM, HLD, MG (Ach-R ab +) presents to neuromuscular clinic to establish care as he was recently diagnosed with MG in March, 2017. Patient started noticing drooping eyelids ~ 2 years ago. Patient also started experiencing neck weakness, choking on food/water, slurred speech and generalized weakness and fatigue during the end of the day. Patient also complains of diplopia, dyspnea and blurry vision. Patient referred by Dr. Carlos for further management of MG. Patient has received IVIG in the end of August- states that he started noticing improvement after a week. He reported improvement in chewing and vision. Patient's current regimen is Prednisone 10 mg (down from 20 mg) and timespan 180 mg bid. Patient states that he has felt increasing shortness of breath since decreasing prednisone dose down to 10 mg. Patient endorses chewing issues fatigue, blurriness of vision, swallowing issues with food and water, and lower ext weakness.      10/10/17: Prednisone 10mg > 30mg; IVIG 80g q3 weeks  3/13/18: Mestinon ER 180mg BID > Mestinon 60mg BID + Mestinon ER 180mg QHS; IVIG 80g q5 weeks  4/10/18: Mestinon ER d/c > Mestinon 60mg TID-QID; start Imuran 50mg BID  6/6/18: No changes to regimen  7/16/18: Thymectomy  8/28/18: No changes to regimen (prednisone 20mg, imuran 50mg BID, IVIG 80g q5w)      ROS:   Review of Systems   Constitutional: +malaise/fatigue. Negative for weight loss.   HENT:  Negative for hearing loss.   Eyes: Negative for blurred vision and double vision.   Respiratory: Negative for shortness of breath and stridor.   Cardiovascular: Negative for chest pain and palpitations.   Gastrointestinal: Negative for nausea, vomiting and constipation. +diarrhea  Neurological: Negative for dizziness and tremors. Negative for focal weakness and seizures.    Psychiatric/Behavioral: Negative for memory loss. Negative for depression and hallucinations. The patient is not nervous/anxious.    Medications    Current Outpatient Medications:     alcohol swabs PadM, Apply 1 each topically 2 hours after meals and at bedtime., Disp: 200 each, Rfl: 0    alfuzosin (UROXATRAL) 10 mg Tb24, Take 10 mg by mouth once daily., Disp: , Rfl: 6    aspirin (ECOTRIN) 81 MG EC tablet, Take 81 mg by mouth once daily., Disp: , Rfl:     atorvastatin (LIPITOR) 80 MG tablet, Take 80 mg by mouth every evening. , Disp: , Rfl:     azaTHIOprine (IMURAN) 50 mg Tab, Take 1 tablet (50 mg total) by mouth 2 (two) times daily., Disp: 180 tablet, Rfl: 0    FARXIGA 5 mg Tab tablet, Take 5 mg by mouth once daily., Disp: , Rfl: 5    isosorbide mononitrate (IMDUR) 30 MG 24 hr tablet, Take 30 mg by mouth every morning. , Disp: , Rfl:     lisinopril (PRINIVIL,ZESTRIL) 40 MG tablet, Take 40 mg by mouth 2 (two) times daily. , Disp: , Rfl:     metFORMIN (GLUCOPHAGE) 1000 MG tablet, Take 1,000 mg by mouth 2 (two) times daily with meals., Disp: , Rfl:     omeprazole (PRILOSEC) 20 MG capsule, TAKE ONE CAPSULE BY MOUTH ONCE DAILY, Disp: 30 capsule, Rfl: 10    ONETOUCH DELICA LANCETS 30 gauge Misc, TEST BLOOD SUGAR ONCE DAILY, Disp: , Rfl: 0    ONETOUCH ULTRA TEST Strp, TEST BLOOD SUGAR ONCE DAILY, Disp: , Rfl: 12    ONETOUCH ULTRA2 kit, USE TO TEST BLOOD GLUCOSE DAILY, Disp: , Rfl: 0    predniSONE (DELTASONE) 10 MG tablet, Take 1 tablet (10 mg total) by mouth once daily., Disp: 60 tablet, Rfl: 0    pyridostigmine (MESTINON) 60 mg Tab,  "Take 1 tablet (60 mg total) by mouth 4 (four) times daily. Take mestinon 3-4 times per day., Disp: 120 tablet, Rfl: 11    pyridostigmine (MESTINON) 60 mg Tab, Take 1 tablet (60 mg total) by mouth 4 (four) times daily as needed., Disp: 180 tablet, Rfl: 4    sertraline (ZOLOFT) 25 MG tablet, Take 1 tablet (25 mg total) by mouth once daily., Disp: 30 tablet, Rfl: 11    TRULICITY 1.5 mg/0.5 mL PnIj, INJECT 1.5MG SUB-Q ONCE A WEEK, Disp: , Rfl: 5      Physical Exam  BP (!) 146/84   Pulse 79   Ht 5' 11" (1.803 m)   BMI 34.47 kg/m²       Constitutional  Well-developed, well-nourished, appears stated age   Neurological    * Mental status      - Orientation  Oriented to person, place, time, and situation     - Attention/concentration  Attentive, vigilant during exam     - Executive  Well-organized thoughts     - Other     * Cranial nerves       - CN II  PERRL     - CN III, IV, VI  Normal pursuits and saccades EOMI     - CN V  Sensation V1 - V3 intact     - CN VII  Face strong and symmetric. No ptosis.     - CN VIII  Not tested     - CN IX, X  Palate raises midline and symmetric     - CN XI  SCM and trapezius 5/5 bilaterally     - CN XII  Tongue midline   * Motor  Muscle bulk normal, strength 5/5 throughout. No fatiguability   * Sensory   Not tested   * Coordination  Not tested   * Gait  Normal casual gait   * Deep tendon reflexes  Not tested         Lab and Test Results   Ref. Range 10/10/2017 12:44   Acetylchol Modul Ab Latest Units: % 96 (H)   AChR Binding Ab, Serum Latest Ref Range: <=0.02 nmol/L 66.2 (H)   AChR Ganglionic Neuronal Ab Latest Ref Range: <=0.02 nmol/L 0.00   MG Lambert-Eaton Interpretation Unknown SEE BELOW   MuSK Antibody Test Latest Ref Range: 0.00 - 0.02 nmol/L 0.00   Striated Muscle Ab Latest Ref Range: <1:120 titer Positive 1:68289 (H)     CMP 3/20/19 ([facility])  ALT 20  AST 32    EMG- electrographic evidence of decremental response to repetative nerve stimulation    Images: None to " review      Assessment and Plan    Otoniel Candelario is a 68 y.o. male with Myasthenia gravis diagnosed in 2017 who is s/p thymectomy (July 2018). He has been essentially asymptomatic since his thymectomy, so I would like to start backing off some of his therapies. We will change his IVIG to 100g x1 day every 8 weeks, and decrease prednisone to 5mg daily. RTC in 8 weeks.      Problem List Items Addressed This Visit        Neuro    Myasthenia gravis - Primary    Overview     Diagnosed: March 2017  MG Type: Generalized  Readmission: None  Antibody status: Striated Ab (+), Ach Binding (+), Ach Modul (+)  Thymoma: Thymectomy 7/16/18   Intubated: Never         Current Assessment & Plan     -- continue imuran 50mg bid  -- CBC and CMP today  -- decrease prednisone to 5mg daily  -- change IVIG to 100g x1 day q8w (same dose, but spaced out)         Relevant Medications    predniSONE (DELTASONE) 10 MG tablet    Other Relevant Orders    CBC auto differential (Completed)       Other    Long-term current use of intravenous immunoglobulin (IVIG)    Current Assessment & Plan     -- change to q8 weeks               Myasthenia Gravis IMPORTANT INFORMATION:    Elective intubation should be considered if serial measurements of the VC show values less than 20 mL/kg or if the MIF is worse than -30 cmH20.    Medication warning list:          1. Absolute contraindication   curare,   d-penicillamine,   botulinum toxin,   interferon alpha    2. Contraindicated  a. Antibiotics-- aminoglycosides (gentamycin, kanamycin, neomycin, streptomycin, tobramycine); macrolides (erythromycin, azithromycin (Z-pack), telithromycin, Biaxin)  Fluoroquinolones ( ciprofloxacin, norfloxacin, levofloxacin);  b. quinine, quinidine, procainamide,  c. magnesium salts, iv magnesium replacement.    3. Caution- may exacerbate weakness in some myasthenics  a. Calcium channel blockers  b. Beta blockers  c. Lithium  d. Statins  e. Iodinated contrast agents  F.  benzodiazepines        Keara Vanegas MD  Neurology Resident   [facility]  [address] [place] [address]  Pager: [phone]

## 2019-04-09 NOTE — ASSESSMENT & PLAN NOTE
-- continue imuran 50mg bid  -- CBC and CMP today  -- decrease prednisone to 5mg daily  -- change IVIG to 100g x1 day q8w (same dose, but spaced out)

## 2019-04-12 NOTE — PROGRESS NOTES
I have reviewed and concur with the resident's history, physical, assessment, and plan.  I have personally interviewed and examined the patient at bedside.

## 2019-04-22 ENCOUNTER — TELEPHONE (OUTPATIENT)
Dept: NEUROLOGY | Facility: CLINIC | Age: 69
End: 2019-04-22

## 2019-04-22 NOTE — TELEPHONE ENCOUNTER
----- Message from Jessica Balderrama sent at 4/22/2019 12:40 PM CDT -----  Contact: [facility]   Pharmacy Calling    Reason for call: Clarification on script for [email or web address]      Pharmacy Name: [facility]     Phone Number:  [phone] option 2     Additional Information:

## 2019-06-04 RX ORDER — AZATHIOPRINE 50 MG/1
TABLET ORAL
Qty: 180 TABLET | Refills: 3 | Status: SHIPPED | OUTPATIENT
Start: 2019-06-04 | End: 2019-06-11

## 2019-06-11 ENCOUNTER — OFFICE VISIT (OUTPATIENT)
Dept: NEUROLOGY | Facility: CLINIC | Age: 69
End: 2019-06-11
Payer: OTHER GOVERNMENT

## 2019-06-11 VITALS
HEART RATE: 75 BPM | DIASTOLIC BLOOD PRESSURE: 86 MMHG | WEIGHT: 248.44 LBS | BODY MASS INDEX: 34.78 KG/M2 | HEIGHT: 71 IN | SYSTOLIC BLOOD PRESSURE: 133 MMHG

## 2019-06-11 DIAGNOSIS — R19.7 DIARRHEA, UNSPECIFIED TYPE: ICD-10-CM

## 2019-06-11 DIAGNOSIS — G70.00 MYASTHENIA GRAVIS: Primary | ICD-10-CM

## 2019-06-11 DIAGNOSIS — Z79.899 LONG-TERM CURRENT USE OF INTRAVENOUS IMMUNOGLOBULIN (IVIG): ICD-10-CM

## 2019-06-11 DIAGNOSIS — Z79.52 CURRENT CHRONIC USE OF SYSTEMIC STEROIDS: ICD-10-CM

## 2019-06-11 DIAGNOSIS — E11.65 TYPE 2 DIABETES MELLITUS WITH HYPERGLYCEMIA, WITHOUT LONG-TERM CURRENT USE OF INSULIN: ICD-10-CM

## 2019-06-11 PROCEDURE — 99215 PR OFFICE/OUTPT VISIT, EST, LEVL V, 40-54 MIN: ICD-10-PCS | Mod: S$GLB,,, | Performed by: STUDENT IN AN ORGANIZED HEALTH CARE EDUCATION/TRAINING PROGRAM

## 2019-06-11 PROCEDURE — 99999 PR PBB SHADOW E&M-EST. PATIENT-LVL IV: CPT | Mod: PBBFAC,,, | Performed by: STUDENT IN AN ORGANIZED HEALTH CARE EDUCATION/TRAINING PROGRAM

## 2019-06-11 PROCEDURE — 99999 PR PBB SHADOW E&M-EST. PATIENT-LVL IV: ICD-10-PCS | Mod: PBBFAC,,, | Performed by: STUDENT IN AN ORGANIZED HEALTH CARE EDUCATION/TRAINING PROGRAM

## 2019-06-11 PROCEDURE — 99215 OFFICE O/P EST HI 40 MIN: CPT | Mod: S$GLB,,, | Performed by: STUDENT IN AN ORGANIZED HEALTH CARE EDUCATION/TRAINING PROGRAM

## 2019-06-11 RX ORDER — AZATHIOPRINE 50 MG/1
TABLET ORAL
Qty: 270 TABLET | Refills: 3
Start: 2019-06-11 | End: 2019-10-07

## 2019-06-11 NOTE — ASSESSMENT & PLAN NOTE
-- last A1c reportedly 7.3 at outside lab   -- scheduled follow up with PCP for ongoing management

## 2019-06-11 NOTE — ASSESSMENT & PLAN NOTE
-- continue to taper - 5mg every other day  -- goal is to taper off at next visit if he remains stable

## 2019-06-11 NOTE — ASSESSMENT & PLAN NOTE
-- increase imuran to 50mg AM + 100mg PM  -- decreased prednisone to 5mg every other day  -- continue IVIG 100g x1 day q8 weeks  -- mestinon 60mg PRN - regularly takes in the morning, and infrequently takes additional tab in the afternoon  -- labs today to monitor LFTs - plan to repeat at next visit as imuran is increasing  -- f/u 3 months with Dr. Skyler Jacques

## 2019-06-11 NOTE — ASSESSMENT & PLAN NOTE
-- Do not suspect this is related to pyridostigmine as the frequency waxes and wanes, and does not always correlate with his pyridostigmine dose. He has decreased how much pyridostigmine he is taking, but the diarrhea has increased in frequency.  -- Will defer to primary care for further workup

## 2019-06-11 NOTE — PROGRESS NOTES
"Patient Name: Otoniel Candelario  MRN: [id number]    CC: Myasthenia gravis    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/18   Medications: Prednisone 5mg daily (max 30mg), Mestinon 60mg daily (+/- additional tab in afternoon), IVIG 100g q8 weeks, Imuran 50mg BID (start 4/10/18)  Acute treatments: None  Intubated: Never    Interval history: Otoniel Candelario is a 68 y.o. male who returns for follow up. His IVIG has been spaced to every 8 weeks which he is tolerating. He is behind by a week and is feeling fatigued. He also has itchy/swollen eyes, is sneezing so he attributes some of his fatigue to allergies. He denies any slurred speech, chewing/swallowing issues, double vision, ptosis, SOB or significant weakness. He occasionally feels mildly weak when walking up stairs, but this doesn't limit him. He does still feel a mild improvement after IVIG sessions, but not as significant as previously. We also decreased his prednisone to 5mg daily. His last A1c was around 7.3.      My first visit 8/28/18:  Otoniel Candelario is a 68 y.o. Male who is new to me, but previously a patient of Dr. Zacarias (see his HPI below with MG details). He last saw Dr. Zacarias in June, and since then has had a thymectomy with some improvement in symptoms. He tolerated the surgery well, but still has trouble with "stamina". He gets easily fatigued in the heat, but feels this might be related to post-op recovery. He denies dysphagia, SOB, focal weakness, ptosis or double vision. He gets cramps in his arms and legs, mostly at night and previous iron studies were normal. He decreased prednisone to 20mg on his own, and only takes mestinon twice a day (morning and before bed).     He is getting IVIG every 5 weeks, and his last treatment was August 15, 16, 17. Prior to surgery, he would start to develop " symptoms a few days prior to IVIG. He can't recall if he developed symptoms before the most recent round, and says it would be difficult to tell because he was still recovering from surgery.    He was started on zoloft for low mood surrounding his diagnosis, but feels he has improved. He would like to consider titrating off this.    Summary of management since Dx:   10/10/17: Prednisone 10mg > 30mg; IVIG 80g q3 weeks  3/13/18: Mestinon ER 180mg BID > Mestinon 60mg BID + Mestinon ER 180mg QHS; IVIG 80g q5 weeks  4/10/18: Mestinon ER d/c > Mestinon 60mg TID-QID; start Imuran 50mg BID  6/6/18: No changes to regimen  7/16/18: Thymectomy  8/28/18: No changes to regimen (prednisone 20mg, imuran 50mg BID, IVIG 80g q5w)  11/27/18: Prednisone decreased to 10mg per day. Continue imuran 50mg BID, IVIG 80g q5w  4/9/19: Prednisone decreased to 5mg daily. IVIG changed to 100g q8w. Continue imuran 50mg BID  6/11/19: Prednisone decreased to 5mg every other day. Increase imuran to 50mg AM + 100mg PM. Continue IVIG 100g q8w        ROS:   Review of Systems   Constitutional: +malaise/fatigue. Negative for weight loss.   HENT: Negative for hearing loss.   Eyes: Negative for blurred vision and double vision.   Respiratory: Negative for shortness of breath and stridor.   Cardiovascular: Negative for chest pain and palpitations.   Gastrointestinal: Negative for nausea, vomiting and constipation. +diarrhea intermittent  Neurological: Negative for dizziness and tremors. Negative for focal weakness and seizures.    Psychiatric/Behavioral: Negative for memory loss. Negative for depression and hallucinations. The patient is not nervous/anxious.    Medications    Current Outpatient Medications:     alcohol swabs PadM, Apply 1 each topically 2 hours after meals and at bedtime., Disp: 200 each, Rfl: 0    alfuzosin (UROXATRAL) 10 mg Tb24, Take 10 mg by mouth once daily., Disp: , Rfl: 6    aspirin (ECOTRIN) 81 MG EC tablet, Take 81 mg by mouth  "once daily., Disp: , Rfl:     atorvastatin (LIPITOR) 80 MG tablet, Take 80 mg by mouth every evening. , Disp: , Rfl:     azaTHIOprine (IMURAN) 50 mg Tab, TAKE 1 TABLET BY MOUTH TWICE A DAY, Disp: 180 tablet, Rfl: 3    FARXIGA 5 mg Tab tablet, Take 5 mg by mouth once daily., Disp: , Rfl: 5    isosorbide mononitrate (IMDUR) 30 MG 24 hr tablet, Take 30 mg by mouth every morning. , Disp: , Rfl:     lisinopril (PRINIVIL,ZESTRIL) 40 MG tablet, Take 40 mg by mouth 2 (two) times daily. , Disp: , Rfl:     metFORMIN (GLUCOPHAGE) 1000 MG tablet, Take 1,000 mg by mouth 2 (two) times daily with meals., Disp: , Rfl:     ONETOUCH DELICA LANCETS 30 gauge Misc, TEST BLOOD SUGAR ONCE DAILY, Disp: , Rfl: 0    ONETOUCH ULTRA TEST Strp, TEST BLOOD SUGAR ONCE DAILY, Disp: , Rfl: 12    ONETOUCH ULTRA2 kit, USE TO TEST BLOOD GLUCOSE DAILY, Disp: , Rfl: 0    predniSONE (DELTASONE) 10 MG tablet, Take 0.5 tablets (5 mg total) by mouth once daily., Disp: 45 tablet, Rfl: 0    pyridostigmine (MESTINON) 60 mg Tab, Take 1 tablet (60 mg total) by mouth 4 (four) times daily as needed. (Patient taking differently: Take 60 mg by mouth 2 (two) times daily. ), Disp: 180 tablet, Rfl: 4    sertraline (ZOLOFT) 25 MG tablet, Take 1 tablet (25 mg total) by mouth once daily., Disp: 30 tablet, Rfl: 11    TRULICITY 1.5 mg/0.5 mL PnIj, INJECT 1.5MG SUB-Q ONCE A WEEK, Disp: , Rfl: 5      Physical Exam  /86   Pulse 75   Ht 5' 11" (1.803 m)   Wt 112.7 kg (248 lb 7.3 oz)   BMI 34.65 kg/m²       Constitutional  Well-developed, well-nourished, appears stated age   Neurological    * Mental status      - Orientation  Oriented to person, place, time, and situation     - Attention/concentration  Attentive, vigilant during exam     - Executive  Well-organized thoughts     - Other     * Cranial nerves       - CN II  PERRL     - CN III, IV, VI  Normal pursuits and saccades EOMI     - CN V  Sensation V1 - V3 intact     - CN VII  Face strong and " symmetric. No ptosis.     - CN VIII  Not tested     - CN IX, X  Palate raises midline and symmetric     - CN XI  SCM and trapezius 5/5 bilaterally     - CN XII  Tongue midline   * Motor  Muscle bulk normal, strength 5/5 throughout. No fatiguability   * Sensory   Not tested   * Coordination  Not tested   * Gait  Normal casual gait   * Deep tendon reflexes  Not tested         Lab and Test Results   Ref. Range 10/10/2017 12:44   Acetylchol Modul Ab Latest Units: % 96 (H)   AChR Binding Ab, Serum Latest Ref Range: <=0.02 nmol/L 66.2 (H)   AChR Ganglionic Neuronal Ab Latest Ref Range: <=0.02 nmol/L 0.00   MG Lambert-Eaton Interpretation Unknown SEE BELOW   MuSK Antibody Test Latest Ref Range: 0.00 - 0.02 nmol/L 0.00   Striated Muscle Ab Latest Ref Range: <1:120 titer Positive 1:75583 (H)     CMP 3/20/19 ([facility])  ALT 20  AST 32    EMG- electrographic evidence of decremental response to repetative nerve stimulation    Images: None to review      Assessment and Plan    Otoniel Candelario is a 68 y.o. male with Myasthenia gravis diagnosed in 2017 who is s/p thymectomy (July 2018). He has improved significantly since his thymectomy, and has tolerated reductions in therapy. I recommend continuing to titrate prednisone (5mg every other day) and will increase imuran to 50mg AM and 100mg PM. Will aim to titrate off prednisone at next visit if he remains stable. Will get labs today and at follow up visit after imuran increase. RTC in 3 months.      Problem List Items Addressed This Visit        Neuro    Myasthenia gravis - Primary    Overview     Diagnosed: March 2017  MG Type: Generalized  Readmission: None  Antibody status: Striated Ab (+), Ach Binding (+), Ach Modul (+)  Thymoma: Thymectomy 7/16/18   Intubated: Never         Current Assessment & Plan     -- increase imuran to 50mg AM + 100mg PM  -- decreased prednisone to 5mg every other day  -- continue IVIG 100g x1 day q8 weeks  -- mestinon 60mg PRN - regularly takes in the morning,  and infrequently takes additional tab in the afternoon  -- labs today to monitor LFTs - plan to repeat at next visit as imuran is increasing  -- f/u 3 months with Dr. Skyler Jacques         Relevant Orders    CBC auto differential    Comprehensive metabolic panel       Endocrine    Type 2 diabetes mellitus    Current Assessment & Plan     -- last A1c reportedly 7.3 at outside lab   -- scheduled follow up with PCP for ongoing management            GI    Diarrhea    Current Assessment & Plan     -- Do not suspect this is related to pyridostigmine as the frequency waxes and wanes, and does not always correlate with his pyridostigmine dose. He has decreased how much pyridostigmine he is taking, but the diarrhea has increased in frequency.  -- Will defer to primary care for further workup            Other    Long-term current use of intravenous immunoglobulin (IVIG)    Current Assessment & Plan     -- continue 100g x1 day every 8 weeks         Current chronic use of systemic steroids    Overview     Started around March 2017 - up to 30mg         Current Assessment & Plan     -- continue to taper - 5mg every other day  -- goal is to taper off at next visit if he remains stable               Myasthenia Gravis IMPORTANT INFORMATION:    Elective intubation should be considered if serial measurements of the VC show values less than 20 mL/kg or if the MIF is worse than -30 cmH20.    Medication warning list:          1. Absolute contraindication   curare,   d-penicillamine,   botulinum toxin,   interferon alpha    2. Contraindicated  a. Antibiotics-- aminoglycosides (gentamycin, kanamycin, neomycin, streptomycin, tobramycine); macrolides (erythromycin, azithromycin (Z-pack), telithromycin, Biaxin)  Fluoroquinolones ( ciprofloxacin, norfloxacin, levofloxacin);  b. quinine, quinidine, procainamide,  c. magnesium salts, iv magnesium replacement.    3. Caution- may exacerbate weakness in some myasthenics  a. Calcium channel  blockers  b. Beta blockers  c. Lithium  d. Statins  e. Iodinated contrast agents  F. benzodiazepines        Keara Vanegas MD  Neurology Resident   [facility] [address] [place] [address]  Pager: [phone]

## 2019-06-11 NOTE — PATIENT INSTRUCTIONS
- Increase Azathioprine (Imuran) to 1 tablet (50mg) in the morning, 2 tablets (100mg) in the evening  - Decrease prednisone to 5mg every other day  - Get labs today

## 2019-06-28 RX ORDER — SERTRALINE HYDROCHLORIDE 25 MG/1
TABLET, FILM COATED ORAL
Qty: 90 TABLET | Refills: 3 | OUTPATIENT
Start: 2019-06-28

## 2019-08-02 ENCOUNTER — TELEPHONE (OUTPATIENT)
Dept: NEUROLOGY | Facility: CLINIC | Age: 69
End: 2019-08-02

## 2019-08-02 NOTE — TELEPHONE ENCOUNTER
----- Message from Jethro DEMETRA Juan sent at 8/2/2019  2:34 PM CDT -----  Needs Advice    Reason for call: Pt is requesting to see Dr. Trejo, rather than Dr. Jacques. Pt last saw Dr. Almeida, recall in to consult w/ Dr. Jacques.    Communication Preference: [phone]    Additional Information:

## 2019-08-15 ENCOUNTER — TELEPHONE (OUTPATIENT)
Dept: NEUROLOGY | Facility: CLINIC | Age: 69
End: 2019-08-15

## 2019-08-15 NOTE — TELEPHONE ENCOUNTER
----- Message from Jethro Juan sent at 8/15/2019 12:22 PM CDT -----  Patient Requesting Sooner Appointment.     Reason for sooner appt.: pt calling to schedule recall dated 09/09  When is the first available appointment? None for the year  Communication Preference: [phone]  Additional Information:

## 2019-08-28 ENCOUNTER — TELEPHONE (OUTPATIENT)
Dept: NEUROLOGY | Facility: CLINIC | Age: 69
End: 2019-08-28

## 2019-08-28 NOTE — TELEPHONE ENCOUNTER
----- Message from Elda Rosa sent at 8/27/2019  5:00 PM CDT -----  Contact: self  Pt called to speak with nurse in office to vincent appt.          Pt callback number [phone]

## 2019-10-07 ENCOUNTER — OFFICE VISIT (OUTPATIENT)
Dept: NEUROLOGY | Facility: CLINIC | Age: 69
End: 2019-10-07
Payer: OTHER GOVERNMENT

## 2019-10-07 VITALS
SYSTOLIC BLOOD PRESSURE: 137 MMHG | HEART RATE: 67 BPM | HEIGHT: 71 IN | BODY MASS INDEX: 35.31 KG/M2 | WEIGHT: 252.19 LBS | DIASTOLIC BLOOD PRESSURE: 85 MMHG

## 2019-10-07 DIAGNOSIS — Z79.899 LONG-TERM CURRENT USE OF INTRAVENOUS IMMUNOGLOBULIN (IVIG): ICD-10-CM

## 2019-10-07 DIAGNOSIS — D84.9 IMMUNOSUPPRESSION: ICD-10-CM

## 2019-10-07 DIAGNOSIS — I10 ESSENTIAL HYPERTENSION: Chronic | ICD-10-CM

## 2019-10-07 DIAGNOSIS — G70.00 MYASTHENIA GRAVIS: Primary | ICD-10-CM

## 2019-10-07 DIAGNOSIS — Z79.52 CURRENT CHRONIC USE OF SYSTEMIC STEROIDS: ICD-10-CM

## 2019-10-07 PROCEDURE — 1159F PR MEDICATION LIST DOCUMENTED IN MEDICAL RECORD: ICD-10-PCS | Mod: S$GLB,,, | Performed by: STUDENT IN AN ORGANIZED HEALTH CARE EDUCATION/TRAINING PROGRAM

## 2019-10-07 PROCEDURE — 99999 PR PBB SHADOW E&M-EST. PATIENT-LVL IV: CPT | Mod: PBBFAC,,, | Performed by: STUDENT IN AN ORGANIZED HEALTH CARE EDUCATION/TRAINING PROGRAM

## 2019-10-07 PROCEDURE — 1125F AMNT PAIN NOTED PAIN PRSNT: CPT | Mod: S$GLB,,, | Performed by: STUDENT IN AN ORGANIZED HEALTH CARE EDUCATION/TRAINING PROGRAM

## 2019-10-07 PROCEDURE — 99214 OFFICE O/P EST MOD 30 MIN: CPT | Mod: S$GLB,,, | Performed by: STUDENT IN AN ORGANIZED HEALTH CARE EDUCATION/TRAINING PROGRAM

## 2019-10-07 PROCEDURE — 1159F MED LIST DOCD IN RCRD: CPT | Mod: S$GLB,,, | Performed by: STUDENT IN AN ORGANIZED HEALTH CARE EDUCATION/TRAINING PROGRAM

## 2019-10-07 PROCEDURE — 99999 PR PBB SHADOW E&M-EST. PATIENT-LVL IV: ICD-10-PCS | Mod: PBBFAC,,, | Performed by: STUDENT IN AN ORGANIZED HEALTH CARE EDUCATION/TRAINING PROGRAM

## 2019-10-07 PROCEDURE — 1125F PR PAIN SEVERITY QUANTIFIED, PAIN PRESENT: ICD-10-PCS | Mod: S$GLB,,, | Performed by: STUDENT IN AN ORGANIZED HEALTH CARE EDUCATION/TRAINING PROGRAM

## 2019-10-07 PROCEDURE — 99214 PR OFFICE/OUTPT VISIT, EST, LEVL IV, 30-39 MIN: ICD-10-PCS | Mod: S$GLB,,, | Performed by: STUDENT IN AN ORGANIZED HEALTH CARE EDUCATION/TRAINING PROGRAM

## 2019-10-07 RX ORDER — AZATHIOPRINE 50 MG/1
50 TABLET ORAL 2 TIMES DAILY
Qty: 270 TABLET | Refills: 3
Start: 2019-10-07 | End: 2020-02-10

## 2019-10-07 NOTE — PATIENT INSTRUCTIONS
-Follow up with dermatology at least annually to monitor for skin cancers while on imuran for myasthenia gravis.  -STOP prednisone.  Call/restart if symptoms significantly worsen.  -Continue imuran 50 mg twice a day, mestinon as needed  -Blood work today  -Return to clinic in 3-4 months    Skyler Jacques MD  [facility]   Phone Number: [phone]  Fax Number: [phone]        Myasthenia Gravis IMPORTANT INFORMATION:    Elective intubation should be considered if serial measurements of the VC show values less than 20 mL/kg or if the MIF is worse than -30 cmH20.    Medication warning list:            1. Absolute contraindication   curare,   d-penicillamine,   botulinum toxin,   interferon alpha    2. Contraindicated  a. Antibiotics-- aminoglycosides (gentamycin, kanamycin, neomycin, streptomycin, tobramycine); macrolides (erythromycin, azithromycin (Z-pack), telithromycin, Biaxin)  Fluoroquinolones (ciprofloxacin, norfloxacin, levofloxacin);  b. quinine, quinidine, procainamide,  c. magnesium salts, iv magnesium replacement.  D. botox  E. Immune checkpoint inhibitors: pembrolizumab (keytruda), nivolumab (opdivo), atezolizumab (tecentriq), avelumab (bavencio), durvalumab (imfinzi), ipilimumab (yervoy).    3. Caution- may exacerbate weakness in some myasthenics  a. Calcium channel blockers  b. Beta blockers  c. Lithium  d. Statins  e. Iodinated contrast agents  F. benzodiazepines

## 2019-10-07 NOTE — ASSESSMENT & PLAN NOTE
Assessment  Otoniel Candelario is a 68 y.o. male w/ PMH significant for MG (dx 2017, s/p thymectomy, + striated, modulating, and binding Abs), HLD, HTN, DM, presenting as referral from Dr. Almeida for evaluation of MG.    Overall, currently doing quite well on his current regimen, but will discontinue prednisone (currently 5 mg QOD) 2/2 long-term side effects.      Recommendations & Plan:  -DISCONTINUE prednisone 5 mg QOD  -Continue imuran 50 mg BID (current dose), IVIG [id number] (arranged through [facility])  -F/u with dermatology for malignancy surveillance while on imuran  -Surveillance CBC/CMP while on imuran  -Discussed vaccines with live virus, ED warnings, cholinergic/MG crises.

## 2019-10-07 NOTE — PROGRESS NOTES
Neurology Clinic  Initial Consult    Patient Name: Otoniel Candelario  MRN: [id number]    CC: Myasthenia Gravis    HPI: Otoniel Candelario is a 68 y.o. male w/ PMH significant for MG (dx 2017, s/p thymectomy, + striated, modulating, and binding Abs), HLD, HTN, DM, presenting as referral from Dr. Almeida for evaluation of MG.    Down to prednisone 5 mg QOD.  Overall, 75-90% better than at onset of symptoms. At last visit with Dr. Almeida, he was instructed to increase imuran to 50 mg BID, but has not done so (no change in prescription label).      States he followed-up with dermatology about a year ago, but is not certain that his dermatologist was aware of his imuran use, and subsequently increased risk of malignancy.    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/18   Medications: Prednisone 5 mg every other day (max 30mg), Mestinon 60 mg daily (+/- additional tab in afternoon), IVIG 100g q8 weeks (through [facility], started 2017), Imuran 50 mg BID (start 4/10/18)  Acute treatments: None  Intubated: Never    Review of Systems:  General: No fevers, chills  Eyes: No changes in vision  ENT: No changes in hearing  Respiratory: No SOB  CV: No chest pain, palpitations  GI: No diarrhea, blood in stool  Urinary: No hematuria  Skin: No rashes  Neurological: No weakness, confusion  Psychiatric: No auditory nor visual hallucinations      Past Medical History  Past Medical History:   Diagnosis Date    Diabetes mellitus     High cholesterol     Hypertension     Myasthenia gravis 03/2017       Medications    Current Outpatient Medications:     alcohol swabs PadM, Apply 1 each topically 2 hours after meals and at bedtime., Disp: 200 each, Rfl: 0    alfuzosin (UROXATRAL) 10 mg Tb24, Take 10 mg by mouth once daily., Disp: , Rfl: 6    aspirin (ECOTRIN) 81 MG EC tablet, Take 81  mg by mouth once daily., Disp: , Rfl:     atorvastatin (LIPITOR) 80 MG tablet, Take 80 mg by mouth every evening. , Disp: , Rfl:     azaTHIOprine (IMURAN) 50 mg Tab, Take 1 tablet (50mg) in the morning and 2 tablets (100mg) in the evening., Disp: 270 tablet, Rfl: 3    FARXIGA 5 mg Tab tablet, Take 5 mg by mouth once daily., Disp: , Rfl: 5    isosorbide mononitrate (IMDUR) 30 MG 24 hr tablet, Take 30 mg by mouth every morning. , Disp: , Rfl:     lisinopril (PRINIVIL,ZESTRIL) 40 MG tablet, Take 40 mg by mouth 2 (two) times daily. , Disp: , Rfl:     metFORMIN (GLUCOPHAGE) 1000 MG tablet, Take 1,000 mg by mouth 2 (two) times daily with meals., Disp: , Rfl:     ONETOUCH DELICA LANCETS 30 gauge Misc, TEST BLOOD SUGAR ONCE DAILY, Disp: , Rfl: 0    ONETOUCH ULTRA TEST Strp, TEST BLOOD SUGAR ONCE DAILY, Disp: , Rfl: 12    ONETOUCH ULTRA2 kit, USE TO TEST BLOOD GLUCOSE DAILY, Disp: , Rfl: 0    predniSONE (DELTASONE) 10 MG tablet, Take 0.5 tablets (5 mg total) by mouth once daily., Disp: 45 tablet, Rfl: 0    pyridostigmine (MESTINON) 60 mg Tab, Take 1 tablet (60 mg total) by mouth 4 (four) times daily as needed. (Patient taking differently: Take 60 mg by mouth 2 (two) times daily. ), Disp: 180 tablet, Rfl: 4    TRULICITY 1.5 mg/0.5 mL PnIj, INJECT 1.5MG SUB-Q ONCE A WEEK, Disp: , Rfl: 5    sertraline (ZOLOFT) 25 MG tablet, Take 1 tablet (25 mg total) by mouth once daily., Disp: 30 tablet, Rfl: 11  Any other notable medications as documented in HPI    Allergies  Review of patient's allergies indicates:   Allergen Reactions    Demerol [meperidine] Anxiety     Hyper/ aggitation       Social History  Social History     Socioeconomic History    Marital status:      Spouse name: Not on file    Number of children: Not on file    Years of education: Not on file    Highest education level: Not on file   Occupational History    Not on file   Social Needs    Financial resource strain: Not on file    Food  "insecurity:     Worry: Not on file     Inability: Not on file    Transportation needs:     Medical: Not on file     Non-medical: Not on file   Tobacco Use    Smoking status: Current Some Day Smoker     Years: 10.00     Types: Cigars    Smokeless tobacco: Never Used   Substance and Sexual Activity    Alcohol use: Yes     Comment: social    Drug use: No    Sexual activity: Not on file   Lifestyle    Physical activity:     Days per week: Not on file     Minutes per session: Not on file    Stress: Not on file   Relationships    Social connections:     Talks on phone: Not on file     Gets together: Not on file     Attends Rastafari service: Not on file     Active member of club or organization: Not on file     Attends meetings of clubs or organizations: Not on file     Relationship status: Not on file   Other Topics Concern    Not on file   Social History Narrative    Not on file     Any other notable Social History as documented in HPI.    Family History  Family History   Problem Relation Age of Onset    Anesthesia problems Neg Hx      Any other notable FMH as documented in HPI.    Physical Exam  /85   Pulse 67   Ht 5' 11" (1.803 m)   Wt 114.4 kg (252 lb 3.3 oz)   BMI 35.18 kg/m²     General: Well-developed, well-groomed. No apparent distress  HENT: Normocephalic, atraumatic.    Cardiovascular: Regular rate and rhythm with no murmurs, rubs or gallops.    Chest: Lungs clear to auscultation bilaterally.  No wheezes, stridor, ronchi appreciated.  Abdomen: Normoactive bowel sounds present.  Soft, nontender to palpation.  Musculoskeletal: No peripheral edema    Neurologic Exam: The patient is awake, alert and oriented. Language is fluent.  Fund of knowledge is appropriate.     Cranial nerves:   Pupils are round and reactive to light and accommodation.   Visual fields are full to confrontation.    Ocular motility is full in all cardinal positions of gaze.   Facial sensation is normal to light touch. "   Facial activation is symmetric.  R ptosis.  Slight disconjugate gaze, fluctuating.  Hearing is symmetric bilaterally.   Palate elevates symmetrically.   Shoulder elevation is symmetric and full strength bilaterally.   Tongue is midline and neck rotation strength is normal bilaterally.        Motor examination of all extremities demonstrates:  Normal bulk and tone in all four limbs.   No atrophy or fasciculations.   Strength is 5/5 in the upper and lower extremities bilaterally.    Sensory examination   Sensation to light touch: intact in BUE and BLE    Deep tendon reflexes are 1+ and symmetric in the upper and 0+ lower extremities bilaterally.  No clonus b/l.    Gait: Antalgic gait, decreased arm swing R>L.    Coordination: Finger to nose is intact on R, slight dysmetria on L.    Miscellaneous:   Single breath count: 30      Lab and Test Results  Results for TARIQ HICKMAN (MRN [id number]) as of 10/7/2019 11:33   Ref. Range 10/10/2017 12:44   IgA Latest Ref Range: 40 - 350 mg/dL 209   Acetylchol Modul Ab Latest Units: % 96 (H)   AChR Binding Ab, Serum Latest Ref Range: <=0.02 nmol/L 66.2 (H)   AChR Ganglionic Neuronal Ab Latest Ref Range: <=0.02 nmol/L 0.00   Collapsin Response- Protein-5-IgG  WBlot, Serum Latest Ref Range: Negative  Negative   MG Lambert-Eaton Interpretation Unknown SEE BELOW   MuSK Antibody Test Latest Ref Range: 0.00 - 0.02 nmol/L 0.00   N-Type Calcium Channel Ab Latest Ref Range: <=0.03 nmol/L 0.00   P/Q Type Calcium Channel Ab Latest Ref Range: <=0.02 nmol/L 0.00   STRIATED MUSCLE AB Latest Ref Range: <1:120 titer Positive 1:60027 (H)       Images:  No new recent imaging.    Pathology:  7/16/19 Pathology after thymectomy: Per report,          Assessment and Plan    Problem List Items Addressed This Visit        Neuro    Myasthenia gravis - Primary    Overview     Diagnosed: March 2017  MG Type: Generalized  Readmission: None  Antibody status: Striated Ab (+), Ach Binding (+), Ach Modul  (+)  Thymoma: Thymectomy 7/16/18   Intubated: Never         Current Assessment & Plan     Assessment  Otoniel Candelario is a 68 y.o. male w/ PMH significant for MG (dx 2017, s/p thymectomy, + striated, modulating, and binding Abs), HLD, HTN, DM, presenting as referral from Dr. Almeida for evaluation of MG.    Overall, currently doing quite well on his current regimen, but will discontinue prednisone (currently 5 mg QOD) 2/2 long-term side effects.      Recommendations & Plan:  -DISCONTINUE prednisone 5 mg QOD  -Continue imuran 50 mg BID (current dose), IVIG [id number] ([facility])  -F/u with dermatology for malignancy surveillance while on imuran  -Surveillance CBC/CMP while on imuran  -Discussed vaccines with live virus, ED warnings, cholinergic/MG crises.           Relevant Orders    CBC auto differential (Completed)    Comprehensive metabolic panel (Completed)       Cardiac/Vascular    Essential hypertension (Chronic)    Current Assessment & Plan     Management per PCP            Other    Long-term current use of intravenous immunoglobulin (IVIG)    Current Assessment & Plan     Q8 week dose for MG         Relevant Orders    CBC auto differential (Completed)    Comprehensive metabolic panel (Completed)    Current chronic use of systemic steroids    Overview     Started around March 2017 - up to 30mg         Current Assessment & Plan     For MG  Gradually weaning, has been on since 2017.    -Currently 5 mg QOD, will now DISCONTINUE.           Other Visit Diagnoses     Immunosuppression        Relevant Orders    CBC auto differential (Completed)    Comprehensive metabolic panel (Completed)            Skyler Jacques MD  Neurology Resident   [facility] [address] [place] [address]

## 2019-11-21 ENCOUNTER — TELEPHONE (OUTPATIENT)
Dept: NEUROLOGY | Facility: CLINIC | Age: 69
End: 2019-11-21

## 2019-11-21 NOTE — TELEPHONE ENCOUNTER
----- Message from Kimmy Ruiz sent at 11/20/2019  1:45 PM CST -----  Contact: Lauren [phone]  Need to confirmed a NPI# for the Doctor    Please advise Lauren with [phone]

## 2019-11-27 ENCOUNTER — TELEPHONE (OUTPATIENT)
Dept: NEUROLOGY | Facility: CLINIC | Age: 69
End: 2019-11-27

## 2019-11-27 NOTE — TELEPHONE ENCOUNTER
----- Message from Sumeet Varghese sent at 11/25/2019 11:14 AM CST -----  Contact: Janes/NATALIYA Patel  Please call Janes at [phone] # [phone]    Fax# [phone]    New Rx needed for IVIG     Patient is 1 week overdue    Thank you

## 2019-11-27 NOTE — TELEPHONE ENCOUNTER
----- Message from Keara Hernandez sent at 11/22/2019 10:54 AM CST -----  Contact: Otoniel Orosco calling with regard to getting his prescription filled at [facility] specialty pharmacy.  He states that he is having an issue getting the infusion medication because the old doctors name is on it.  He will need Dr. Jacques to submit an updated prescription to the pharmacy.  He can be reached at [phone]

## 2019-11-29 ENCOUNTER — TELEPHONE (OUTPATIENT)
Dept: NEUROLOGY | Facility: CLINIC | Age: 69
End: 2019-11-29

## 2019-11-29 NOTE — TELEPHONE ENCOUNTER
----- Message from Bubba Srivastava sent at 11/29/2019 11:22 AM CST -----  Contact: [facility] @  [phone] opt [phone]  Caller calling to get an update on the medication refill request for ( GAMUNEX C )     [facility] - Rhina Coronel  [address] [address]  Phone: [phone] Fax: [phone]

## 2019-12-03 ENCOUNTER — TELEPHONE (OUTPATIENT)
Dept: NEUROLOGY | Facility: CLINIC | Age: 69
End: 2019-12-03

## 2019-12-03 NOTE — TELEPHONE ENCOUNTER
----- Message from Maribel Dickson sent at 12/2/2019  1:19 PM CST -----  Contact: Otoniel  Pt stated he needed to speak with someone in the office.  Pt needed to speak with someone about his meds. [facility] specialty pharmacy [phone] option 2. Pt contact number is [phone].

## 2019-12-03 NOTE — TELEPHONE ENCOUNTER
Spoke with [facility] pharmacist to allow a year of refills for IVIG per Dr. Clements.

## 2019-12-10 ENCOUNTER — TELEPHONE (OUTPATIENT)
Dept: NEUROLOGY | Facility: CLINIC | Age: 69
End: 2019-12-10

## 2020-02-10 ENCOUNTER — OFFICE VISIT (OUTPATIENT)
Dept: NEUROLOGY | Facility: CLINIC | Age: 70
End: 2020-02-10
Payer: OTHER GOVERNMENT

## 2020-02-10 VITALS
BODY MASS INDEX: 35.7 KG/M2 | HEART RATE: 66 BPM | SYSTOLIC BLOOD PRESSURE: 154 MMHG | WEIGHT: 255 LBS | HEIGHT: 71 IN | DIASTOLIC BLOOD PRESSURE: 94 MMHG

## 2020-02-10 DIAGNOSIS — Z79.899 LONG-TERM CURRENT USE OF INTRAVENOUS IMMUNOGLOBULIN (IVIG): ICD-10-CM

## 2020-02-10 DIAGNOSIS — R19.7 DIARRHEA, UNSPECIFIED TYPE: ICD-10-CM

## 2020-02-10 DIAGNOSIS — G70.00 MYASTHENIA GRAVIS: Primary | ICD-10-CM

## 2020-02-10 PROBLEM — Z79.52 CURRENT CHRONIC USE OF SYSTEMIC STEROIDS: Status: RESOLVED | Noted: 2018-07-16 | Resolved: 2020-02-10

## 2020-02-10 PROCEDURE — 99999 PR PBB SHADOW E&M-EST. PATIENT-LVL IV: CPT | Mod: PBBFAC,,, | Performed by: STUDENT IN AN ORGANIZED HEALTH CARE EDUCATION/TRAINING PROGRAM

## 2020-02-10 PROCEDURE — 99213 PR OFFICE/OUTPT VISIT, EST, LEVL III, 20-29 MIN: ICD-10-PCS | Mod: S$GLB,,, | Performed by: STUDENT IN AN ORGANIZED HEALTH CARE EDUCATION/TRAINING PROGRAM

## 2020-02-10 PROCEDURE — 99999 PR PBB SHADOW E&M-EST. PATIENT-LVL IV: ICD-10-PCS | Mod: PBBFAC,,, | Performed by: STUDENT IN AN ORGANIZED HEALTH CARE EDUCATION/TRAINING PROGRAM

## 2020-02-10 PROCEDURE — 99213 OFFICE O/P EST LOW 20 MIN: CPT | Mod: S$GLB,,, | Performed by: STUDENT IN AN ORGANIZED HEALTH CARE EDUCATION/TRAINING PROGRAM

## 2020-02-10 RX ORDER — PYRIDOSTIGMINE BROMIDE 60 MG/1
30 TABLET ORAL
COMMUNITY
End: 2021-03-03

## 2020-02-10 RX ORDER — AZATHIOPRINE 50 MG/1
TABLET ORAL
Qty: 270 TABLET | Refills: 3
Start: 2020-02-10 | End: 2021-02-04 | Stop reason: SDUPTHER

## 2020-02-10 NOTE — PATIENT INSTRUCTIONS
-INCREASE azathioprine to 50 mg in AM and 100 mg at night  -DECREASE mestinon to 30 mg as needed  -Continue IVIG  -Discuss diarrhea with primary care doctor, particularly if no improvement with decreased mestinon dose.  -Send recent blood work to this clinic  -Return to clinic in 3-4 months      Skyler Jacques MD    [facility]   [address] [place] [address]  Phone Number: [phone]  Fax Number: [phone]

## 2020-02-10 NOTE — PROGRESS NOTES
Neurology Clinic  Follow-up Visit    Patient Name: Otoniel Candelario  MRN: [id number]    CC: Myasthenia Gravis    HPI: Otoniel Candelario is a 69 y.o. male w/ PMH significant for MG (dx 2017, s/p thymectomy, + striated, modulating, and binding Abs), HLD, HTN, DM, presenting as follow-up for MG.    2/10/20 Interval History  Feels tired in mid-morning.  Had lisinopril and isosorbide-mononitrate cut down recently which led to some improvement.  Has noticed recent diarrhea, foul-smelling, and dark colored (not black).  Last colonoscopy reported 5 years ago was normal per pt.    Feels a little more tired right before IVIG, but then returns to baseline shortly after IVIG.  Does not notice much effect from mestinon, currently taking 1-2x/day.    Mestinon 60 mg 1-2x/day.    Azathoprine 50 mg BID  [facility]hvsfo [facility]    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/18   Medications: Prednisone 5 mg every other day (max 30mg), Mestinon 60 mg daily (+/- additional tab in afternoon), IVIG 100g q8 weeks ([facility], started 2017), Imuran 50 mg BID (start 4/10/18)  Acute treatments: None  Intubated: Never    Review of Systems:  General: No fevers, chills  Eyes: No changes in vision  ENT: No changes in hearing  Respiratory: No SOB  CV: No chest pain, palpitations  GI: +diarrhea, no hematochezia.  Urinary: No hematuria  Skin: No rashes  Neurological: No weakness, confusion  Psychiatric: No auditory nor visual hallucinations      Past Medical History  Past Medical History:   Diagnosis Date    Diabetes mellitus     High cholesterol     Hypertension     Myasthenia gravis 03/2017       Medications    Current Outpatient Medications:     alcohol swabs PadM, Apply 1 each topically 2 hours after meals and at bedtime., Disp: 200 each, Rfl: 0    alfuzosin (UROXATRAL) 10 mg Tb24, Take 10  mg by mouth once daily., Disp: , Rfl: 6    aspirin (ECOTRIN) 81 MG EC tablet, Take 81 mg by mouth once daily., Disp: , Rfl:     atorvastatin (LIPITOR) 80 MG tablet, Take 80 mg by mouth every evening. , Disp: , Rfl:     azaTHIOprine (IMURAN) 50 mg Tab, Take 1 tablet (50mg) in the morning and 2 tablets (100mg) in the evening., Disp: 270 tablet, Rfl: 3    FARXIGA 5 mg Tab tablet, Take 5 mg by mouth once daily., Disp: , Rfl: 5    isosorbide mononitrate (IMDUR) 30 MG 24 hr tablet, Take 15 mg by mouth every morning. , Disp: , Rfl:     lisinopril (PRINIVIL,ZESTRIL) 40 MG tablet, Take 20 mg by mouth once daily. , Disp: , Rfl:     metFORMIN (GLUCOPHAGE) 1000 MG tablet, Take 1,000 mg by mouth 2 (two) times daily with meals., Disp: , Rfl:     ONETOUCH DELICA LANCETS 30 gauge Misc, TEST BLOOD SUGAR ONCE DAILY, Disp: , Rfl: 0    ONETOUCH ULTRA TEST Strp, TEST BLOOD SUGAR ONCE DAILY, Disp: , Rfl: 12    ONETOUCH ULTRA2 kit, USE TO TEST BLOOD GLUCOSE DAILY, Disp: , Rfl: 0    pyridostigmine (MESTINON) 60 mg Tab, Take 30 mg by mouth., Disp: , Rfl:     TRULICITY 1.5 mg/0.5 mL PnIj, INJECT 1.5MG SUB-Q ONCE A WEEK, Disp: , Rfl: 5    pyridostigmine (MESTINON) 60 mg Tab, Take 1 tablet (60 mg total) by mouth 4 (four) times daily as needed. (Patient taking differently: Take 60 mg by mouth 2 (two) times daily. ), Disp: 180 tablet, Rfl: 4    sertraline (ZOLOFT) 25 MG tablet, Take 1 tablet (25 mg total) by mouth once daily., Disp: 30 tablet, Rfl: 11  Any other notable medications as documented in HPI    Allergies  Review of patient's allergies indicates:   Allergen Reactions    Demerol [meperidine] Anxiety     Hyper/ aggitation       Social History  Social History     Socioeconomic History    Marital status:      Spouse name: Not on file    Number of children: Not on file    Years of education: Not on file    Highest education level: Not on file   Occupational History    Not on file   Social Needs    Financial  "resource strain: Not on file    Food insecurity:     Worry: Not on file     Inability: Not on file    Transportation needs:     Medical: Not on file     Non-medical: Not on file   Tobacco Use    Smoking status: Current Some Day Smoker     Years: 10.00     Types: Cigars    Smokeless tobacco: Never Used   Substance and Sexual Activity    Alcohol use: Yes     Comment: social    Drug use: No    Sexual activity: Not on file   Lifestyle    Physical activity:     Days per week: Not on file     Minutes per session: Not on file    Stress: Not on file   Relationships    Social connections:     Talks on phone: Not on file     Gets together: Not on file     Attends Anabaptism service: Not on file     Active member of club or organization: Not on file     Attends meetings of clubs or organizations: Not on file     Relationship status: Not on file   Other Topics Concern    Not on file   Social History Narrative    Not on file     Any other notable Social History as documented in HPI.    Family History  Family History   Problem Relation Age of Onset    Anesthesia problems Neg Hx      Any other notable FMH as documented in HPI.    Physical Exam  BP (!) 154/94   Pulse 66   Ht 5' 11" (1.803 m)   Wt 115.7 kg (255 lb)   BMI 35.57 kg/m²     General: Well-developed, well-groomed. No apparent distress  HENT: Normocephalic, atraumatic.    Cardiovascular: Regular rate and rhythm.  1/6 systolic murmur best heard at L lower sternal border.    Chest: Lungs clear to auscultation bilaterally.  No wheezes, stridor, ronchi appreciated.  Abdomen: HYPERACTIVE bowel sounds present.  Soft, nontender to palpation.  Musculoskeletal: No peripheral edema    Neurologic Exam: The patient is awake, alert and oriented. Language is fluent.  Fund of knowledge is appropriate.     Cranial nerves:   Pupils are round and reactive to light and accommodation.   Visual fields are full to confrontation.    Ocular motility is full in all cardinal " positions of gaze. No disconjugate gaze.  Facial sensation is normal to light touch.   Transverse smile.  Hearing is symmetric bilaterally.   Palate elevates symmetrically.   Shoulder elevation is symmetric and full strength bilaterally.   Tongue is midline and neck rotation strength is normal bilaterally.      Motor examination of all extremities demonstrates:  Normal bulk and tone in all four limbs.   No atrophy or fasciculations.   Strength is 5/5 in the upper and lower extremities bilaterally.  5/5 neck extension  4+/5 neck flexion    Sensory examination   Sensation to light touch: intact in BUE and BLE    Deep tendon reflexes are 1+ and symmetric in the upper and 1+ R patellar, very trace on L patellar    Gait: Antalgic gait, decreased arm swing R>L.    Coordination: Finger to nose is intact on R, slight dysmetria on L.    Miscellaneous:   Single breath count: 22 (slow, approx 1 count per second)      Lab and Test Results     Ref. Range 10/10/2017 12:44   IgA Latest Ref Range: 40 - 350 mg/dL 209   Acetylchol Modul Ab Latest Units: % 96 (H)   AChR Binding Ab, Serum Latest Ref Range: <=0.02 nmol/L 66.2 (H)   AChR Ganglionic Neuronal Ab Latest Ref Range: <=0.02 nmol/L 0.00   Collapsin Response- Protein-5-IgG  WBlot, Serum Latest Ref Range: Negative  Negative   MG Lambert-Eaton Interpretation Unknown SEE BELOW   MuSK Antibody Test Latest Ref Range: 0.00 - 0.02 nmol/L 0.00   N-Type Calcium Channel Ab Latest Ref Range: <=0.03 nmol/L 0.00   P/Q Type Calcium Channel Ab Latest Ref Range: <=0.02 nmol/L 0.00   STRIATED MUSCLE AB Latest Ref Range: <1:120 titer Positive 1:79167 (H)       Images:  No new recent imaging.    Pathology:  7/16/19 Pathology after thymectomy: Per report,          Assessment and Plan    Problem List Items Addressed This Visit        Neuro    Myasthenia gravis - Primary    Overview     Diagnosed: March 2017  MG Type: Generalized  Readmission: None  Antibody status: Striated Ab (+), Ach  Binding (+), Ach Modul (+)  Thymoma: Thymectomy 7/16/18   Intubated: Never         Current Assessment & Plan     Assessment  Otoniel Candelario is a 69 y.o. male w/ PMH significant for MG (dx 2017, s/p thymectomy, + striated, modulating, and binding Abs), HLD, HTN, DM, presenting as follow-up for MG.    Concerned by his reports of fatigue as of late.  Unclear if related to BP (fatigue improved after cutting down his BP meds), related to diarrhea (?GI bleed - foul-smelling stool, dark-colored vs anticholinergic effect of mestinon), or worsening of MG.      Recommendations & Plan:  -INCREASE imuran to 50 mg Qdaily and 100 mg QHS  -DECREASE mestinon to 30 mg PRN  -Continue IVIG [id number] ([facility])  -F/u with dermatology for malignancy surveillance while on imuran  -Send reports of recent CBC/CMP for surveillance while on imuran  -IF no improvement in diarrhea w/ decreased mestinon dose, pt will discuss with PCP (?GI bleed or other etiology)  -RTC 3 months           Relevant Medications    azaTHIOprine (IMURAN) 50 mg Tab       GI    Diarrhea    Current Assessment & Plan     See MG section  -GI bleed, mestinon/anticholinergic effect, other            Other    Long-term current use of intravenous immunoglobulin (IVIG)    Current Assessment & Plan     See MG section                 Skyler Jacques MD  Neurology Resident   [facility]  [address] [place] [address]

## 2020-03-10 RX ORDER — ALFUZOSIN HYDROCHLORIDE 10 MG/1
10 TABLET, EXTENDED RELEASE ORAL DAILY
Qty: 30 TABLET | Refills: 0 | Status: SHIPPED | OUTPATIENT
Start: 2020-03-10 | End: 2020-04-03 | Stop reason: SDUPTHER

## 2020-03-20 NOTE — TELEPHONE ENCOUNTER
Patient Name: Eric Bernardo      MRN: [id number]   : 1987 Sex: male PCP: Kait Brown MD Age: 33 year old   Payor: OTHER INSURANCE / Plan: BASIC / Product Type: COMM        Who is calling?: pt wife    Medication name and dose: vaccines    concern:pt had vaccines last visit having allergic reactions    Callback Information  Best Contact Number: on file  Ok to leave a detailed message: No   Spoke to Arlette about this and she states she spoke to them over the phone.

## 2020-04-03 RX ORDER — ALFUZOSIN HYDROCHLORIDE 10 MG/1
10 TABLET, EXTENDED RELEASE ORAL DAILY
Qty: 30 TABLET | Refills: 1 | Status: SHIPPED | OUTPATIENT
Start: 2020-04-03 | End: 2020-08-25 | Stop reason: SDUPTHER

## 2020-05-01 RX ORDER — ALFUZOSIN HYDROCHLORIDE 10 MG/1
10 TABLET, EXTENDED RELEASE ORAL
Qty: 30 TABLET | Refills: 0 | Status: SHIPPED | OUTPATIENT
Start: 2020-05-01 | End: 2020-09-28 | Stop reason: SDUPTHER

## 2020-05-04 ENCOUNTER — PATIENT MESSAGE (OUTPATIENT)
Dept: NEUROLOGY | Facility: CLINIC | Age: 70
End: 2020-05-04

## 2020-05-13 ENCOUNTER — TELEPHONE (OUTPATIENT)
Dept: NEUROLOGY | Facility: CLINIC | Age: 70
End: 2020-05-13

## 2020-05-13 NOTE — TELEPHONE ENCOUNTER
----- Message from Claudia Hernandez sent at 5/13/2020  8:11 AM CDT -----  Contact: pt@ [phone]  Calling to reschedule his appt with Dr. Jacques, scheduled for 5/14. System will not allow rescheduling at this time. Please call.

## 2020-05-14 ENCOUNTER — OFFICE VISIT (OUTPATIENT)
Dept: NEUROLOGY | Facility: CLINIC | Age: 70
End: 2020-05-14
Payer: OTHER GOVERNMENT

## 2020-05-14 ENCOUNTER — TELEPHONE (OUTPATIENT)
Dept: NEUROLOGY | Facility: CLINIC | Age: 70
End: 2020-05-14

## 2020-05-14 DIAGNOSIS — G70.00 MYASTHENIA GRAVIS: Primary | ICD-10-CM

## 2020-05-14 DIAGNOSIS — D84.9 IMMUNOSUPPRESSION: ICD-10-CM

## 2020-05-14 DIAGNOSIS — Z79.899 LONG-TERM CURRENT USE OF INTRAVENOUS IMMUNOGLOBULIN (IVIG): ICD-10-CM

## 2020-05-14 PROCEDURE — 99213 OFFICE O/P EST LOW 20 MIN: CPT | Mod: 95,,, | Performed by: STUDENT IN AN ORGANIZED HEALTH CARE EDUCATION/TRAINING PROGRAM

## 2020-05-14 PROCEDURE — 99213 PR OFFICE/OUTPT VISIT, EST, LEVL III, 20-29 MIN: ICD-10-PCS | Mod: 95,,, | Performed by: STUDENT IN AN ORGANIZED HEALTH CARE EDUCATION/TRAINING PROGRAM

## 2020-05-14 NOTE — PROGRESS NOTES
The patient location is: [place]  The chief complaint leading to consultation is: Myasthenia gravis   Visit type: audiovisual  Total time spent with patient: 4412-4345  Each patient to whom he or she provides medical services by telemedicine is:  (1) informed of the relationship between the physician and patient and the respective role of any other health care provider with respect to management of the patient; and (2) notified that he or she may decline to receive medical services by telemedicine and may withdraw from such care at any time.      Neurology Clinic  Telemedicine Follow-up Visit    Patient Name: Otoniel Candelario  MRN: [id number]    CC: Myasthenia Gravis    HPI: Otoniel Candelario is a 69 y.o. male w/ PMH significant for MG (dx 2017, s/p thymectomy, + striated, modulating, and binding Abs), HLD, HTN, DM, presenting as follow-up for MG.    5/14/20 Interval History - Telemedicine Visit  In the interval since February, decreased BP meds and now feeling better (likely orthostatic hypotension).  Continues on imuran 50 mg Qdaily and 100 mg QHS and IVIG Q3bddmp ([facility]  Now completely off pyridostigmine (no doses in last 2 weeks).      States overall he feels great.  Diarrhea resolved.    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/18   Medications: IVIG 100g q8 weeks (through [facility], started 2017), Imuran 50 mg Qdaily + 100 mg QHS   Acute treatments: None  Intubated: Never    Review of Systems:  General: No fevers, chills  Eyes: No changes in vision  ENT: No changes in hearing  Respiratory: No SOB  CV: No chest pain, palpitations  GI: no diarrhea, no hematochezia.  Urinary: No hematuria  Skin: No rashes  Neurological: No weakness, confusion  Psychiatric: No auditory nor visual hallucinations      Past Medical History  Past Medical History:   Diagnosis Date     Diabetes mellitus     High cholesterol     Hypertension     Myasthenia gravis 03/2017       Medications    Current Outpatient Medications:     alcohol swabs PadM, Apply 1 each topically 2 hours after meals and at bedtime., Disp: 200 each, Rfl: 0    alfuzosin (UROXATRAL) 10 mg Tb24, Take 1 tablet (10 mg total) by mouth once daily., Disp: 30 tablet, Rfl: 1    alfuzosin (UROXATRAL) 10 mg Tb24, Take 1 tablet (10 mg total) by mouth daily with breakfast., Disp: 30 tablet, Rfl: 0    aspirin (ECOTRIN) 81 MG EC tablet, Take 81 mg by mouth once daily., Disp: , Rfl:     atorvastatin (LIPITOR) 80 MG tablet, Take 80 mg by mouth every evening. , Disp: , Rfl:     azaTHIOprine (IMURAN) 50 mg Tab, Take 1 tablet (50mg) in the morning and 2 tablets (100mg) in the evening., Disp: 270 tablet, Rfl: 3    FARXIGA 5 mg Tab tablet, Take 5 mg by mouth once daily., Disp: , Rfl: 5    isosorbide mononitrate (IMDUR) 30 MG 24 hr tablet, Take 15 mg by mouth every morning. , Disp: , Rfl:     lisinopril (PRINIVIL,ZESTRIL) 40 MG tablet, Take 20 mg by mouth once daily. , Disp: , Rfl:     metFORMIN (GLUCOPHAGE) 1000 MG tablet, Take 1,000 mg by mouth 2 (two) times daily with meals., Disp: , Rfl:     ONETOUCH DELICA LANCETS 30 gauge Misc, TEST BLOOD SUGAR ONCE DAILY, Disp: , Rfl: 0    ONETOUCH ULTRA TEST Strp, TEST BLOOD SUGAR ONCE DAILY, Disp: , Rfl: 12    ONETOUCH ULTRA2 kit, USE TO TEST BLOOD GLUCOSE DAILY, Disp: , Rfl: 0    pyridostigmine (MESTINON) 60 mg Tab, Take 1 tablet (60 mg total) by mouth 4 (four) times daily as needed. (Patient taking differently: Take 60 mg by mouth 2 (two) times daily. ), Disp: 180 tablet, Rfl: 4    pyridostigmine (MESTINON) 60 mg Tab, Take 30 mg by mouth., Disp: , Rfl:     sertraline (ZOLOFT) 25 MG tablet, Take 1 tablet (25 mg total) by mouth once daily., Disp: 30 tablet, Rfl: 11    TRULICITY 1.5 mg/0.5 mL PnIj, INJECT 1.5MG SUB-Q ONCE A WEEK, Disp: , Rfl: 5  Any other notable medications as  documented in HPI    Allergies  Review of patient's allergies indicates:   Allergen Reactions    Demerol [meperidine] Anxiety     Hyper/ aggitation       Social History  Social History     Socioeconomic History    Marital status:      Spouse name: Not on file    Number of children: Not on file    Years of education: Not on file    Highest education level: Not on file   Occupational History    Not on file   Social Needs    Financial resource strain: Not on file    Food insecurity:     Worry: Not on file     Inability: Not on file    Transportation needs:     Medical: Not on file     Non-medical: Not on file   Tobacco Use    Smoking status: Current Some Day Smoker     Years: 10.00     Types: Cigars    Smokeless tobacco: Never Used   Substance and Sexual Activity    Alcohol use: Yes     Comment: social    Drug use: No    Sexual activity: Not on file   Lifestyle    Physical activity:     Days per week: Not on file     Minutes per session: Not on file    Stress: Not on file   Relationships    Social connections:     Talks on phone: Not on file     Gets together: Not on file     Attends Orthodox service: Not on file     Active member of club or organization: Not on file     Attends meetings of clubs or organizations: Not on file     Relationship status: Not on file   Other Topics Concern    Not on file   Social History Narrative    Not on file     Any other notable Social History as documented in HPI.    Family History  Family History   Problem Relation Age of Onset    Anesthesia problems Neg Hx      Any other notable FMH as documented in HPI.    Physical Exam  There were no vitals taken for this visit.    Exam limited -  televideo visit    General: Well-developed, well-groomed. No apparent distress  Eyes: Anicteric, noninjected.  ENT: Normocephalic, atraumatic.    Respiratory: No respiratory distress.    Cardiovascular: Deferred - televideo visit  Abdomen: Deferred - televideo visit  Skin: No  rashes, or lesions, nodules on exposed areas    Neurologic Exam  The patient is awake, alert and oriented. Language is fluent.  Fund of knowledge and attention are appropriate, able to provide detailed medical history.    Cranial nerves:   Pupils are round.  Ocular motility is full in all cardinal positions of gaze.   Facial activation is symmetric.   Shoulder elevation is symmetric.  Tongue protrudes midline.  Exam limited 2/2 televideo visit.    Motor examination   Normal bulk in BUE  No pronator drift.  Able to stand on toes, do a squat.  Exam limited 2/2 televideo visit.    Sensory examination   Deferred - televideo visit    Deep tendon reflexes  Deferred - televideo visit    Gait:   Deferred - televideo visit    Coordination: Finger to nose is normal bilaterally.  Rapid finger movements intact b/l.    Miscellaneous:  Single breath count: 38      Lab and Test Results     Ref. Range 10/10/2017 12:44   IgA Latest Ref Range: 40 - 350 mg/dL 209   Acetylchol Modul Ab Latest Units: % 96 (H)   AChR Binding Ab, Serum Latest Ref Range: <=0.02 nmol/L 66.2 (H)   AChR Ganglionic Neuronal Ab Latest Ref Range: <=0.02 nmol/L 0.00   Collapsin Response- Protein-5-IgG  WBlot, Serum Latest Ref Range: Negative  Negative   MG Lambert-Eaton Interpretation Unknown SEE BELOW   MuSK Antibody Test Latest Ref Range: 0.00 - 0.02 nmol/L 0.00   N-Type Calcium Channel Ab Latest Ref Range: <=0.03 nmol/L 0.00   P/Q Type Calcium Channel Ab Latest Ref Range: <=0.02 nmol/L 0.00   STRIATED MUSCLE AB Latest Ref Range: <1:120 titer Positive 1:74867 (H)       Images:  No new recent imaging.    Pathology:  7/16/19 Pathology after thymectomy: Per report,          Assessment and Plan    Problem List Items Addressed This Visit        Neuro    Myasthenia gravis - Primary    Overview     Diagnosed: March 2017  MG Type: Generalized  Readmission: None  Antibody status: Striated Ab (+), Ach Binding (+), Ach Modul (+)  Thymoma: Thymectomy 7/16/18    Intubated: Never         Current Assessment & Plan     Assessment  Otoniel Candelario is a 69 y.o. male w/ PMH significant for MG (dx 2017, s/p thymectomy, + striated, modulating, and binding Abs), HLD, HTN, DM, presenting as follow-up for MG.    He has improved after decreasing BP meds and increasing nightly imuran to 100 QHS.  Will plan to continue imuran at current dose for another 3 months.  If still doing well at that point, will extend IVIG interval and attempt to wean.    Recommendations & Plan:  -continue imuran 50 mg Qdaily and 100 mg QHS  -continue mestinon to 30 mg PRN  -Continue IVIG [id number] ([facility])  -Send reports of recent CBC/CMP for surveillance while on imuran (will mail external orders, discussed importance of continued monitoring)  -RTC 3 months           Relevant Orders    CBC auto differential    Comprehensive metabolic panel       Immunology/Multi System    Immunosuppression    Current Assessment & Plan     Imuran, IVIG  -Maintain all appropriate cancer screenings  -CMP, CBC Q3 months         Relevant Orders    CBC auto differential    Comprehensive metabolic panel       Other    Long-term current use of intravenous immunoglobulin (IVIG)    Current Assessment & Plan     CMP Q3 months as above  Consider increasing IVIG interval (ordered [facility]) if symptoms still doing well at next visit after increased imuran dose has taken full effect.         Relevant Orders    CBC auto differential    Comprehensive metabolic panel            Skyler Jacques MD  Neurology Resident   [facility]  [address] [place] [address]

## 2020-05-14 NOTE — TELEPHONE ENCOUNTER
----- Message from Jethro Juan sent at 5/14/2020  8:26 AM CDT -----  Contact: pt @ [phone]  Pt states he was not able to complete VV today bc he was not sure how to use [employer]. Pt states he will download the lexus and try to sign on. Pls call pt to confirm the pt will still be seen.

## 2020-05-14 NOTE — PATIENT INSTRUCTIONS
-Continue imuran, IVIG  -Mestinon 30 mg as needed  -Get blood work every 3 months and fax results to clinic at number below (will mail lab orders, please let us know if you don't receive them)  -Return to clinic 3 months      Skyler Jacques MD    [facility]   [address] [place] [address]  Phone Number: [phone]  Fax Number: [phone]

## 2020-05-15 NOTE — ASSESSMENT & PLAN NOTE
CMP Q3 months as above  Consider increasing IVIG interval (ordered [facility]) if symptoms still doing well at next visit after increased imuran dose has taken full effect.

## 2020-05-15 NOTE — ASSESSMENT & PLAN NOTE
Assessment  Otoniel Candelario is a 69 y.o. male w/ PMH significant for MG (dx 2017, s/p thymectomy, + striated, modulating, and binding Abs), HLD, HTN, DM, presenting as follow-up for MG.    He has improved after decreasing BP meds and increasing nightly imuran to 100 QHS.  Will plan to continue imuran at current dose for another 3 months.  If still doing well at that point, will extend IVIG interval and attempt to wean.    Recommendations & Plan:  -continue imuran 50 mg Qdaily and 100 mg QHS  -continue mestinon to 30 mg PRN  -Continue IVIG [id number] ([facility])  -Send reports of recent CBC/CMP for surveillance while on imuran (will mail external orders, discussed importance of continued monitoring)  -RTC 3 months

## 2020-05-20 NOTE — PROGRESS NOTES
I have reviewed the history and physical, assessments, and plan, I concur with her/his documentation of Otoniel Candelario. Patient was seen and examined with the resident.    Susie Markham MD  General Neurology Staff  [facility]

## 2020-05-27 ENCOUNTER — OFFICE VISIT (OUTPATIENT)
Dept: UROLOGY | Facility: CLINIC | Age: 70
End: 2020-05-27
Payer: OTHER GOVERNMENT

## 2020-05-27 VITALS
SYSTOLIC BLOOD PRESSURE: 138 MMHG | DIASTOLIC BLOOD PRESSURE: 86 MMHG | BODY MASS INDEX: 35 KG/M2 | HEART RATE: 71 BPM | HEIGHT: 71 IN | WEIGHT: 250 LBS | RESPIRATION RATE: 18 BRPM

## 2020-05-27 DIAGNOSIS — C61 PROSTATE CANCER: ICD-10-CM

## 2020-05-27 DIAGNOSIS — R39.198 DIFFICULTY VOIDING: Primary | ICD-10-CM

## 2020-05-27 LAB
BILIRUB SERPL-MCNC: NORMAL MG/DL
BLOOD URINE, POC: NORMAL
COLOR, POC UA: YELLOW
GLUCOSE UR QL STRIP: NORMAL
KETONES UR QL STRIP: NORMAL
LEUKOCYTE ESTERASE URINE, POC: NORMAL
NITRITE, POC UA: NORMAL
PH, POC UA: 6
POC RESIDUAL URINE VOLUME: 90 ML (ref 0–100)
PROTEIN, POC: NORMAL
PSA, DIAGNOSTIC: 4.6 NG/ML (ref 0–4)
SPECIFIC GRAVITY, POC UA: <=1.005
UROBILINOGEN, POC UA: 0.2

## 2020-05-27 PROCEDURE — 81002 POCT URINE DIPSTICK WITHOUT MICROSCOPE: ICD-10-PCS | Mod: S$GLB,,, | Performed by: NURSE PRACTITIONER

## 2020-05-27 PROCEDURE — 51798 US URINE CAPACITY MEASURE: CPT | Mod: S$GLB,,, | Performed by: UROLOGY

## 2020-05-27 PROCEDURE — 99214 OFFICE O/P EST MOD 30 MIN: CPT | Mod: 25,S$GLB,, | Performed by: UROLOGY

## 2020-05-27 PROCEDURE — 81002 URINALYSIS NONAUTO W/O SCOPE: CPT | Mod: S$GLB,,, | Performed by: NURSE PRACTITIONER

## 2020-05-27 PROCEDURE — 99214 PR OFFICE/OUTPT VISIT, EST, LEVL IV, 30-39 MIN: ICD-10-PCS | Mod: 25,S$GLB,, | Performed by: UROLOGY

## 2020-05-27 PROCEDURE — 51798 POCT BLADDER SCAN: ICD-10-PCS | Mod: S$GLB,,, | Performed by: UROLOGY

## 2020-05-27 NOTE — PROGRESS NOTES
Subjective:       Patient ID: Otoniel Candelario is a 69 y.o. male.    Chief Complaint: Follow-up and Prostate Cancer      HPI: 69-year-old male known service Dr. Fofana follow-up of prostate cancer on observation protocol.  [place] 6 on 1 core.  Daily Uroxatral 10 mg, working well for him.  Requesting refill.  Denies dysuria, frequency, urgency, incontinence or gross hematuria.  He is having some mild erectile dysfunction problems he has tried Viagra in the past but discontinued secondary to adverse side effects, nervousness and headache.  States he is able to get maintain erection is just not very hard about 5 on a scale of 10.  No other urologic complaints.  No unexplained weight loss or new onset bone pain    PSA of record 4.32 drawn on 09/04/2019       Past Medical History:   Past Medical History:   Diagnosis Date    Diabetes mellitus     Difficulty voiding     H/O    High cholesterol     Hypertension     Myasthenia gravis 03/2017    Prostate CA        Past Surgical Historical:   Past Surgical History:   Procedure Laterality Date    APPENDECTOMY      HERNIA REPAIR      ROBOT-ASSISTED EXPLORATORY LAPAROSCOPY N/A 7/16/2018    Procedure: ROBOTIC LAPAROSCOPY, EXPLORATORY;  Surgeon: Ricky Hernandez MD;  Location: [facility] OR [address] [place] [address];  Service: Thoracic;  Laterality: N/A;    STERNOTOMY N/A 7/16/2018    Procedure: STERNOTOMY median, thymectomy;  Surgeon: Ricky Hernandez MD;  Location: [facility] OR [address] [place] [address];  Service: Thoracic;  Laterality: N/A;        Medications:   Medication List with Changes/Refills   Current Medications    ALCOHOL SWABS PADM    Apply 1 each topically 2 hours after meals and at bedtime.    ALFUZOSIN (UROXATRAL) 10 MG TB24    Take 1 tablet (10 mg total) by mouth once daily.    ALFUZOSIN (UROXATRAL) 10 MG TB24    Take 1 tablet (10 mg total) by mouth daily with breakfast.    ASPIRIN (ECOTRIN) 81 MG EC TABLET    Take 81 mg by mouth once daily.    ATORVASTATIN (LIPITOR) 80 MG TABLET    Take 80 mg by  mouth every evening.     AZATHIOPRINE (IMURAN) 50 MG TAB    Take 1 tablet (50mg) in the morning and 2 tablets (100mg) in the evening.    FARXIGA 5 MG TAB TABLET    Take 5 mg by mouth once daily.    ISOSORBIDE MONONITRATE (IMDUR) 30 MG 24 HR TABLET    Take 15 mg by mouth every morning.     LISINOPRIL (PRINIVIL,ZESTRIL) 40 MG TABLET    Take 20 mg by mouth once daily.     METFORMIN (GLUCOPHAGE) 1000 MG TABLET    Take 1,000 mg by mouth 2 (two) times daily with meals.    ONETOUCH DELICA LANCETS 30 GAUGE MISC    TEST BLOOD SUGAR ONCE DAILY    ONETOUCH ULTRA TEST STRP    TEST BLOOD SUGAR ONCE DAILY    ONETOUCH ULTRA2 KIT    USE TO TEST BLOOD GLUCOSE DAILY    PYRIDOSTIGMINE (MESTINON) 60 MG TAB    Take 1 tablet (60 mg total) by mouth 4 (four) times daily as needed.    PYRIDOSTIGMINE (MESTINON) 60 MG TAB    Take 30 mg by mouth.    SERTRALINE (ZOLOFT) 25 MG TABLET    Take 1 tablet (25 mg total) by mouth once daily.    TRULICITY 1.5 MG/0.5 ML PNIJ    INJECT 1.5MG SUB-Q ONCE A WEEK        Past Social History:   Social History     Socioeconomic History    Marital status:      Spouse name: Not on file    Number of children: Not on file    Years of education: Not on file    Highest education level: Not on file   Occupational History    Not on file   Social Needs    Financial resource strain: Not on file    Food insecurity:     Worry: Not on file     Inability: Not on file    Transportation needs:     Medical: Not on file     Non-medical: Not on file   Tobacco Use    Smoking status: Current Some Day Smoker     Years: 10.00     Types: Cigars    Smokeless tobacco: Never Used   Substance and Sexual Activity    Alcohol use: Yes     Comment: social    Drug use: No    Sexual activity: Not on file   Lifestyle    Physical activity:     Days per week: Not on file     Minutes per session: Not on file    Stress: Not on file   Relationships    Social connections:     Talks on phone: Not on file     Gets together: Not on  file     Attends [employer] service: Not on file     Active member of club or organization: Not on file     Attends meetings of clubs or organizations: Not on file     Relationship status: Not on file   Other Topics Concern    Not on file   Social History Narrative    Not on file       Allergies:   Review of patient's allergies indicates:   Allergen Reactions    Demerol [meperidine] Anxiety     Hyper/ aggitation        Family History:   Family History   Problem Relation Age of Onset    Anesthesia problems Neg Hx         Review of Systems:  Review of Systems   Constitutional: Negative for activity change and appetite change.   HENT: Negative for congestion and dental problem.    Eyes: Negative for visual disturbance.   Respiratory: Negative for chest tightness and shortness of breath.    Cardiovascular: Negative for chest pain.   Gastrointestinal: Negative for abdominal distention and abdominal pain.   Genitourinary: Negative for decreased urine volume, difficulty urinating, discharge, dysuria, enuresis, flank pain, frequency, genital sores, hematuria, penile pain, penile swelling, scrotal swelling, testicular pain and urgency.   Musculoskeletal: Negative for back pain and neck pain.   Skin: Negative for color change.   Neurological: Negative for dizziness.   Hematological: Negative for adenopathy.   Psychiatric/Behavioral: Negative for agitation, behavioral problems and confusion.       Physical Exam:  Physical Exam   Constitutional: He is oriented to person, place, and time. He appears well-developed and well-nourished.   HENT:   Head: Normocephalic.   Eyes: No scleral icterus.   Neck: Normal range of motion.   Cardiovascular: Intact distal pulses.    Pulmonary/Chest: Effort normal and breath sounds normal.   Abdominal: Soft. He exhibits no distension. There is no tenderness. No hernia. Hernia confirmed negative in the right inguinal area and confirmed negative in the left inguinal area.   Genitourinary: Testes  normal and penis normal. Cremasteric reflex is present.   Neurological: He is alert and oriented to person, place, and time.   Skin: Skin is warm and dry.     Psychiatric: He has a normal mood and affect.       Assessment/Plan:   Prostate cancer--on observation protocol.  Serum PSA today    BPH with LUTS--urinalysis negative, PVR 90 mL continue Uroxatral, script sent to pharmacy    Erectile dysfunction--room script for generic Cialis 20 mg, 3 tabs, 1 p.o. p.r.n. with refill and also gave him a script for Levitra 20 mg,3 tabs 1 p.o. p.r.n. with refill should Cialis be ineffective.  He understands not to take both medications together,but to separate them out by 24 hr minimum  Problem List Items Addressed This Visit     None      Visit Diagnoses     Difficulty voiding    -  Primary    Relevant Orders    POCT Bladder Scan (Completed)    Prostate cancer        Relevant Orders    POCT Bladder Scan (Completed)    Prostate Specific Antigen, Diagnostic    POCT urine dipstick without microscope

## 2020-05-28 ENCOUNTER — TELEPHONE (OUTPATIENT)
Dept: UROLOGY | Facility: CLINIC | Age: 70
End: 2020-05-28

## 2020-05-28 NOTE — TELEPHONE ENCOUNTER
----- Message from Vj Pena NP sent at 5/28/2020  9:23 AM CDT -----  Please call the patient regarding his abnormal result.  Notify PSA stable at 4.6, with a previous PSA of 4.3 in September of 2019

## 2020-06-11 ENCOUNTER — TELEPHONE (OUTPATIENT)
Dept: NEUROLOGY | Facility: CLINIC | Age: 70
End: 2020-06-11

## 2020-06-11 DIAGNOSIS — G70.00 MYASTHENIA GRAVIS: ICD-10-CM

## 2020-06-11 NOTE — TELEPHONE ENCOUNTER
Outgoing call to Mr. Candelario. His spouse, Mrs. Ellis, indicated that patient doing well and was outside cutting the grass. RN Coordinator inquired of preferred pharmacy and clarification of administration.     Mr. Candelario stated he is taking Imuran 50mg two tabs (100mg) every evening. He is no longer taking 50mg in the morning. Preferred pharmacy [facility] in [place], [place].    Call to [facility] for refill authorization.

## 2020-08-17 ENCOUNTER — OFFICE VISIT (OUTPATIENT)
Dept: NEUROLOGY | Facility: CLINIC | Age: 70
End: 2020-08-17
Payer: MEDICARE

## 2020-08-17 VITALS — HEIGHT: 71 IN | WEIGHT: 258.63 LBS | BODY MASS INDEX: 36.21 KG/M2

## 2020-08-17 DIAGNOSIS — I10 ESSENTIAL HYPERTENSION: Chronic | ICD-10-CM

## 2020-08-17 DIAGNOSIS — E11.65 TYPE 2 DIABETES MELLITUS WITH HYPERGLYCEMIA, WITHOUT LONG-TERM CURRENT USE OF INSULIN: ICD-10-CM

## 2020-08-17 DIAGNOSIS — E78.00 PURE HYPERCHOLESTEROLEMIA: Chronic | ICD-10-CM

## 2020-08-17 DIAGNOSIS — G70.00 MYASTHENIA GRAVIS, ACHR ANTIBODY POSITIVE: Primary | Chronic | ICD-10-CM

## 2020-08-17 PROCEDURE — 99214 OFFICE O/P EST MOD 30 MIN: CPT | Mod: S$GLB,,, | Performed by: PSYCHIATRY & NEUROLOGY

## 2020-08-17 PROCEDURE — 99214 PR OFFICE/OUTPT VISIT, EST, LEVL IV, 30-39 MIN: ICD-10-PCS | Mod: S$GLB,,, | Performed by: PSYCHIATRY & NEUROLOGY

## 2020-08-17 PROCEDURE — 99999 PR PBB SHADOW E&M-EST. PATIENT-LVL III: CPT | Mod: PBBFAC,,, | Performed by: PSYCHIATRY & NEUROLOGY

## 2020-08-17 PROCEDURE — 99999 PR PBB SHADOW E&M-EST. PATIENT-LVL III: ICD-10-PCS | Mod: PBBFAC,,, | Performed by: PSYCHIATRY & NEUROLOGY

## 2020-08-17 NOTE — PROGRESS NOTES
Subjective:     Chief Complaint:  Consult        History of Present Illness:  I have reviewed all relevant medical records in Epic. Otoniel Candelario is a 69 y.o. male who presents today for follow up of AChR Ab+ MG (binding and modulating), also SMAb+. Diagnosed in 2017, thymectomy (no thymoma) in 2018. Managed initially with steroids and IVIg, now on Imuran that was started over one year ago, off of steroids, and getting IVIg per [facility] Q8 weeks. No reported weakness or relapse in between Ig infusions. Tolerating infusions well but he does not report any weaknesses developing prior to Ig and not reporting any improvements after IVIg. Tolerating Imuran well (100 mg QHS) and needs no Mestinon for rescue. Onset of symptoms included UE and LE weaknesses as well as dysphagia and some voice changes. Little to no ocular weaknesses reported. Since last seen here by Dr. Jacques he has had no weaknesses at all and reports active days doing yard work, etc. No issues with walking, no falls. No dyspnea, no orthopnea, no dysphagia, no diplopia, no ptosis.    Diagnosed: March 2017  MG Type: Generalized (ptosis, dysphagia, dysarthria, generalized weakness)  Readmission: None  Antibody status: Striated Ab (+), Ach Binding (+), Ach Modul (+)  Thymoma: Thymectomy 7/16/18   Medications: IVIG 100g q8 weeks (through [facility], started 2017), Imuran 100 mg QHS   Acute treatments: None  Intubated: Never    Review of Systems  Review of Systems   Constitutional: Negative for activity change, fatigue and fever.   HENT: Negative for hearing loss, trouble swallowing and voice change.    Eyes: Negative for pain, redness and visual disturbance.   Respiratory: Negative for choking, chest tightness and shortness of breath.    Cardiovascular: Negative for chest pain.   Gastrointestinal: Negative for abdominal pain, nausea and vomiting.   Endocrine: Negative for cold intolerance.   Genitourinary: Negative for frequency.   Musculoskeletal: Negative  "for arthralgias, back pain, gait problem, joint swelling, myalgias and neck pain.   Skin: Negative for color change.   Allergic/Immunologic: Negative for immunocompromised state.   Neurological: Negative for dizziness, seizures, speech difficulty, weakness, numbness and headaches.   Hematological: Negative for adenopathy.   Psychiatric/Behavioral: Negative for agitation, behavioral problems, dysphoric mood and suicidal ideas.        Objective:     Vitals:    08/17/20 1304   Weight: 117.3 kg (258 lb 9.6 oz)   Height: 5' 11" (1.803 m)   PainSc: 0-No pain       Neurologic Exam     Mental Status   Oriented to person, place, and time.   Attention: normal.   Speech: speech is normal   Level of consciousness: alert  Knowledge: good.     Cranial Nerves     CN II   Visual fields full to confrontation.     CN III, IV, VI   Pupils are equal, round, and reactive to light.  Extraocular motions are normal.   Diplopia: none  Upgaze: normal    CN V   Facial sensation intact.     CN VII   Facial expression full, symmetric.     CN VIII   Hearing: intact    CN IX, X   CN IX normal.     CN XI   CN XI normal.     CN XII   CN XII normal.   Maintains upward eye gaze x 30 seconds without drop.     Motor Exam   Muscle bulk: normal  Overall muscle tone: normal    Strength   Strength 5/5 throughout.   No fatigueable weakness in bilateral deltoids or biceps brachii with repeated resistance.     Sensory Exam   Light touch normal.   Vibration normal.     Gait, Coordination, and Reflexes     Gait  Gait: normal    Tremor   Resting tremor: absent  Intention tremor: absent  Action tremor: absent    Reflexes   Right brachioradialis: 2+  Left brachioradialis: 2+  Right biceps: 2+  Left biceps: 2+  Right triceps: 2+  Left triceps: 2+  Right patellar: 2+  Left patellar: 2+  Right achilles: 2+  Left achilles: 2+      Physical Exam  Vitals signs reviewed.   Constitutional:       Appearance: He is well-developed.   HENT:      Head: Normocephalic and " atraumatic.   Eyes:      Extraocular Movements: EOM normal.      Pupils: Pupils are equal, round, and reactive to light.   Neck:      Musculoskeletal: Normal range of motion and neck supple.      Thyroid: No thyromegaly.   Cardiovascular:      Rate and Rhythm: Normal rate.   Pulmonary:      Effort: Pulmonary effort is normal.   Abdominal:      Palpations: Abdomen is soft.   Lymphadenopathy:      Cervical: No cervical adenopathy.   Skin:     General: Skin is warm and dry.   Neurological:      Mental Status: He is oriented to person, place, and time.      Gait: Gait is intact.      Deep Tendon Reflexes: Strength normal.      Reflex Scores:       Tricep reflexes are 2+ on the right side and 2+ on the left side.       Bicep reflexes are 2+ on the right side and 2+ on the left side.       Brachioradialis reflexes are 2+ on the right side and 2+ on the left side.       Patellar reflexes are 2+ on the right side and 2+ on the left side.       Achilles reflexes are 2+ on the right side and 2+ on the left side.  Psychiatric:         Speech: Speech normal.         Behavior: Behavior normal.         Thought Content: Thought content normal.           Lab Results   Component Value Date    WBC 6.98 10/07/2019    HGB 14.4 10/07/2019    HCT 45.0 10/07/2019     10/07/2019    ALT 21 10/07/2019    AST 31 10/07/2019     10/07/2019    K 4.4 10/07/2019     10/07/2019    CREATININE 1.0 10/07/2019    BUN 13 10/07/2019    CO2 32 (H) 10/07/2019    HGBA1C 10.2 (H) 07/19/2018         Assessment and Plan:     Problem List Items Addressed This Visit     Myasthenia gravis, AChR antibody positive - Primary (Chronic)    Overview     Diagnosed: March 2017  MG Type: Generalized  Readmission: None  Antibody status: Striated Ab (+), Ach Binding (+), Ach Modul (+)  Thymoma: Thymectomy 7/16/18   Intubated: Never         Current Assessment & Plan     Doing very well on current regimen. Since he is not noticing any relapse in between Ig  infusions and no improvements after infusions, we will discontinue. He was instructed to let me know if he experiences relapse without IVIg, in which case we will start PO Prednisone and increase Imuran (unless Mestinon is sufficient to cover any weaknesses).   - Discontinue IVIg;   - Continue Imuran 100 daily. Labs reviewed (7/2 he had WBC 6.9, AST/ALT 31/38, BUN/Cr 12.0 / 1.1);   - Mestinon 30-60 mg PO PRN breakthrough weaknesses.     Myasthenia Gravis IMPORTANT INFORMATION:    Elective intubation should be considered if serial measurements of the VC show values less than 20 mL/kg or if the NIF is worse than -30 cm H20 or is progressively worsening after serial evaluation.      Absolute Contraindicated Medications (Category 1) Contraindicated Medications (Category 2) Likely to Worsen MG Cautionary Medications  (Category 3) That May Worsen MG but are Usually Tolerated    Curare   Botulinum toxin   D-penicillamine   Interferon alpha     Aminoglycoside (Gentamycin, Kanamycin, Streptomycin, Neomycin, Tobramycin)   Macrolide (Erythromycin, Azithromycin, Telithromycin, Biaxin, Clarithromycin)   Fluoroquinolone - (Ciprofloxacin, Moxifloxacin, Levofloxacin, Delafloxacin, Norfloxacin, Prulifloxacin)   Quinine (Use only for Malaria)   Quinidine   Procainamide   Magnesium salts, IV magnesium replacement   Chloroquine   Hydroxychloroquine   Immune checkpoint inhibitors: Pembrolizumab (Keytruda), Nivolumab (Opdivo), Atezolizumab (Tecentriq), Avelumab (Bavencio), Durvalumab (Imfinzi), Ipilimumab (Yervoy)    Atropine    Corticosteroids   Desferrioxamine   Beta blockers   Statins   Iodinated radiologic contrast agents   Lithium   Benzodiazepines    Calcium Channel Blockers (verapamil)    Doxacurium   Neuromuscular blockades (Succinylcholine, Cisatracurium, Rocuronium, Vecuronium, Atracurium)   Diclomine                  Pure hypercholesterolemia (Chronic)    Current Assessment & Plan     On appropriate  medications and managed by PCP. We discussed the importance of managing all secondary stroke risk factors, including hyperlipidemia.           Essential hypertension (Chronic)    Current Assessment & Plan     On appropriate medications and managed by PCP. We discussed the importance of managing all secondary stroke risk factors, including hypertension.           Type 2 diabetes mellitus    Current Assessment & Plan     On appropriate medications and managed by PCP. We discussed the importance of managing all secondary stroke risk factors, including DM2.               RTC in 6 months    Wenceslao Trejo MD  [facility]

## 2020-08-17 NOTE — ASSESSMENT & PLAN NOTE
Doing very well on current regimen. Since he is not noticing any relapse in between Ig infusions and no improvements after infusions, we will discontinue. He was instructed to let me know if he experiences relapse without IVIg, in which case we will start PO Prednisone and increase Imuran (unless Mestinon is sufficient to cover any weaknesses).   - Discontinue IVIg;   - Continue Imuran 100 daily. Labs reviewed (7/2 he had WBC 6.9, AST/ALT 31/38, BUN/Cr 12.0 / 1.1);   - Mestinon 30-60 mg PO PRN breakthrough weaknesses.     Myasthenia Gravis IMPORTANT INFORMATION:    Elective intubation should be considered if serial measurements of the VC show values less than 20 mL/kg or if the NIF is worse than -30 cm H20 or is progressively worsening after serial evaluation.      Absolute Contraindicated Medications (Category 1) Contraindicated Medications (Category 2) Likely to Worsen MG Cautionary Medications  (Category 3) That May Worsen MG but are Usually Tolerated    Curare   Botulinum toxin   D-penicillamine   Interferon alpha     Aminoglycoside (Gentamycin, Kanamycin, Streptomycin, Neomycin, Tobramycin)   Macrolide (Erythromycin, Azithromycin, Telithromycin, Biaxin, Clarithromycin)   Fluoroquinolone - (Ciprofloxacin, Moxifloxacin, Levofloxacin, Delafloxacin, Norfloxacin, Prulifloxacin)   Quinine (Use only for Malaria)   Quinidine   Procainamide   Magnesium salts, IV magnesium replacement   Chloroquine   Hydroxychloroquine   Immune checkpoint inhibitors: Pembrolizumab (Keytruda), Nivolumab (Opdivo), Atezolizumab (Tecentriq), Avelumab (Bavencio), Durvalumab (Imfinzi), Ipilimumab (Yervoy)    Atropine    Corticosteroids   Desferrioxamine   Beta blockers   Statins   Iodinated radiologic contrast agents   Lithium   Benzodiazepines    Calcium Channel Blockers (verapamil)    Doxacurium   Neuromuscular blockades (Succinylcholine, Cisatracurium, Rocuronium, Vecuronium, Atracurium)   Diclomine

## 2020-08-17 NOTE — LETTER
August 17, 2020      Skyler Jacques MD  [address]  [facility] [address]           [facility]  [address]  [facility] [address]  Phone: [phone]  Fax: [phone]          Patient: Otoniel Candelario   MR Number: [id number]   YOB: 1950   Date of Visit: 8/17/2020       Dear Dr. Skyler Jacques:    Thank you for referring Otoniel Candelario to me for evaluation. Attached you will find relevant portions of my assessment and plan of care.    If you have questions, please do not hesitate to call me. I look forward to following Otoniel Candelario along with you.    Sincerely,    Fernando Trejo MD    Enclosure  CC:  No Recipients    If you would like to receive this communication electronically, please contact [email or web address] or [phone] to request more information on [employer] access.    For providers and/or their staff who would like to refer a patient to [facility], please contact us through our one-stop-shop provider referral line, [facility], at [phone].    If you feel you have received this communication in error or would no longer like to receive these types of communications, please e-mail [email or web address]

## 2020-08-25 ENCOUNTER — TELEPHONE (OUTPATIENT)
Dept: NEUROLOGY | Facility: CLINIC | Age: 70
End: 2020-08-25

## 2020-08-25 RX ORDER — ALFUZOSIN HYDROCHLORIDE 10 MG/1
10 TABLET, EXTENDED RELEASE ORAL DAILY
Qty: 30 TABLET | Refills: 1 | Status: SHIPPED | OUTPATIENT
Start: 2020-08-25 | End: 2020-10-23 | Stop reason: SDUPTHER

## 2020-08-25 NOTE — TELEPHONE ENCOUNTER
----- Message from Bubba Srivastava sent at 8/25/2020  4:21 PM CDT -----  Contact: Rossana espinosa [facility] @ [phone]  Caller calling to get the [facility] licence # to process the claim for [employer], pls call

## 2020-09-28 RX ORDER — ALFUZOSIN HYDROCHLORIDE 10 MG/1
10 TABLET, EXTENDED RELEASE ORAL
Qty: 30 TABLET | Refills: 0 | Status: SHIPPED | OUTPATIENT
Start: 2020-09-28 | End: 2021-03-03

## 2020-10-23 RX ORDER — ALFUZOSIN HYDROCHLORIDE 10 MG/1
10 TABLET, EXTENDED RELEASE ORAL DAILY
Qty: 30 TABLET | Refills: 0 | Status: SHIPPED | OUTPATIENT
Start: 2020-10-23 | End: 2021-03-03 | Stop reason: SDUPTHER

## 2020-10-28 ENCOUNTER — OFFICE VISIT (OUTPATIENT)
Dept: UROLOGY | Facility: CLINIC | Age: 70
End: 2020-10-28
Payer: OTHER GOVERNMENT

## 2020-10-28 VITALS
WEIGHT: 258 LBS | HEART RATE: 100 BPM | DIASTOLIC BLOOD PRESSURE: 92 MMHG | SYSTOLIC BLOOD PRESSURE: 146 MMHG | BODY MASS INDEX: 35.98 KG/M2

## 2020-10-28 DIAGNOSIS — C61 PROSTATE CANCER: ICD-10-CM

## 2020-10-28 DIAGNOSIS — R39.198 DIFFICULTY VOIDING: Primary | ICD-10-CM

## 2020-10-28 LAB — POC RESIDUAL URINE VOLUME: 10 ML (ref 0–100)

## 2020-10-28 PROCEDURE — 51798 US URINE CAPACITY MEASURE: CPT | Mod: S$GLB,,, | Performed by: UROLOGY

## 2020-10-28 PROCEDURE — 51798 POCT BLADDER SCAN: ICD-10-PCS | Mod: S$GLB,,, | Performed by: UROLOGY

## 2020-10-28 PROCEDURE — 99214 OFFICE O/P EST MOD 30 MIN: CPT | Mod: S$GLB,,, | Performed by: UROLOGY

## 2020-10-28 PROCEDURE — 99214 PR OFFICE/OUTPT VISIT, EST, LEVL IV, 30-39 MIN: ICD-10-PCS | Mod: S$GLB,,, | Performed by: UROLOGY

## 2020-10-28 NOTE — PROGRESS NOTES
Subjective:       Patient ID: Otoniel Candelario is a 70 y.o. male.    Chief Complaint: Prostate Cancer (3 mth F/U)      HPI: 69 yo fu prostate cancer on observation, difficulty voiding on uroxatrol, and ED       Past Medical History:   Past Medical History:   Diagnosis Date    Diabetes mellitus     Difficulty voiding     H/O    High cholesterol     Hypertension     Myasthenia gravis 03/2017    Prostate CA        Past Surgical Historical:   Past Surgical History:   Procedure Laterality Date    APPENDECTOMY      HERNIA REPAIR      ROBOT-ASSISTED EXPLORATORY LAPAROSCOPY N/A 7/16/2018    Procedure: ROBOTIC LAPAROSCOPY, EXPLORATORY;  Surgeon: Ricky Hernandez MD;  Location: [facility] OR [address] [place] [address];  Service: Thoracic;  Laterality: N/A;    STERNOTOMY N/A 7/16/2018    Procedure: STERNOTOMY median, thymectomy;  Surgeon: Ricky Hernandez MD;  Location: [facility] OR [address] [place] [address];  Service: Thoracic;  Laterality: N/A;        Medications:   Medication List with Changes/Refills   Current Medications    ALCOHOL SWABS PADM    Apply 1 each topically 2 hours after meals and at bedtime.    ALFUZOSIN (UROXATRAL) 10 MG TB24    Take 1 tablet (10 mg total) by mouth daily with breakfast.    ALFUZOSIN (UROXATRAL) 10 MG TB24    Take 1 tablet (10 mg total) by mouth once daily.    ASPIRIN (ECOTRIN) 81 MG EC TABLET    Take 81 mg by mouth once daily.    ATORVASTATIN (LIPITOR) 80 MG TABLET    Take 80 mg by mouth every evening.     AZATHIOPRINE (IMURAN) 50 MG TAB    Take 1 tablet (50mg) in the morning and 2 tablets (100mg) in the evening.    FARXIGA 5 MG TAB TABLET    Take 5 mg by mouth once daily.    ISOSORBIDE MONONITRATE (IMDUR) 30 MG 24 HR TABLET    Take 15 mg by mouth every morning.     LISINOPRIL (PRINIVIL,ZESTRIL) 40 MG TABLET    Take 20 mg by mouth once daily.     METFORMIN (GLUCOPHAGE) 1000 MG TABLET    Take 1,000 mg by mouth 2 (two) times daily with meals.    ONETOUCH DELICA LANCETS 30 GAUGE MISC    TEST BLOOD SUGAR ONCE DAILY    ONETOUCH  ULTRA TEST STRP    TEST BLOOD SUGAR ONCE DAILY    TabbedOutTOUCH ULTRA2 KIT    USE TO TEST BLOOD GLUCOSE DAILY    PYRIDOSTIGMINE (MESTINON) 60 MG TAB    Take 1 tablet (60 mg total) by mouth 4 (four) times daily as needed.    PYRIDOSTIGMINE (MESTINON) 60 MG TAB    Take 30 mg by mouth.    SERTRALINE (ZOLOFT) 25 MG TABLET    Take 1 tablet (25 mg total) by mouth once daily.    TRULICITY 1.5 MG/0.5 ML PNIJ    INJECT 1.5MG SUB-Q ONCE A WEEK        Past Social History:   Social History     Socioeconomic History    Marital status:      Spouse name: Not on file    Number of children: Not on file    Years of education: Not on file    Highest education level: Not on file   Occupational History    Not on file   Social Needs    Financial resource strain: Not on file    Food insecurity     Worry: Not on file     Inability: Not on file    Transportation needs     Medical: Not on file     Non-medical: Not on file   Tobacco Use    Smoking status: Current Some Day Smoker     Years: 10.00     Types: Cigars    Smokeless tobacco: Never Used   Substance and Sexual Activity    Alcohol use: Yes     Comment: social    Drug use: No    Sexual activity: Not on file   Lifestyle    Physical activity     Days per week: Not on file     Minutes per session: Not on file    Stress: Not on file   Relationships    Social connections     Talks on phone: Not on file     Gets together: Not on file     Attends [employer] service: Not on file     Active member of club or organization: Not on file     Attends meetings of clubs or organizations: Not on file     Relationship status: Not on file   Other Topics Concern    Not on file   Social History Narrative    Not on file       Allergies:   Review of patient's allergies indicates:   Allergen Reactions    Demerol [meperidine] Anxiety     Hyper/ aggitation        Family History:   Family History   Problem Relation Age of Onset    Anesthesia problems Neg Hx         Review of Systems:  Review  of Systems   Constitutional: Negative for activity change and appetite change.   HENT: Negative for congestion and dental problem.    Eyes: Negative for visual disturbance.   Respiratory: Negative for chest tightness and shortness of breath.    Cardiovascular: Negative for chest pain.   Gastrointestinal: Negative for abdominal distention and abdominal pain.   Genitourinary: Negative for decreased urine volume, difficulty urinating, discharge, dysuria, enuresis, flank pain, frequency, genital sores, hematuria, penile pain, penile swelling, scrotal swelling, testicular pain and urgency.   Musculoskeletal: Negative for back pain and neck pain.   Skin: Negative for color change.   Neurological: Negative for dizziness.   Hematological: Negative for adenopathy.   Psychiatric/Behavioral: Negative for agitation, behavioral problems and confusion.       Physical Exam:  Physical Exam  Vitals signs and nursing note reviewed.   Constitutional:       Appearance: He is well-developed.   HENT:      Head: Normocephalic.   Eyes:      Pupils: Pupils are equal, round, and reactive to light.   Neck:      Musculoskeletal: Normal range of motion and neck supple.   Cardiovascular:      Rate and Rhythm: Normal rate and regular rhythm.      Heart sounds: Normal heart sounds.   Pulmonary:      Effort: Pulmonary effort is normal.      Breath sounds: Normal breath sounds.   Abdominal:      General: Bowel sounds are normal.      Palpations: Abdomen is soft.   Genitourinary:     Penis: Normal.    Musculoskeletal: Normal range of motion.   Skin:     General: Skin is warm and dry.   Neurological:      Mental Status: He is alert and oriented to person, place, and time.   Psychiatric:         Behavior: Behavior normal.     bladder scan is 10    Assessment/Plan:     prostate ca on observation--check psa and notify pt    Difficulty voiding doing well on uroxatrol--script provided    ED  Doing well on present regimine    Fu 3 months  Problem List Items  Addressed This Visit     None      Visit Diagnoses     Difficulty voiding    -  Primary    Relevant Orders    POCT Bladder Scan (Completed)    Prostate cancer        Relevant Orders    Prostate Specific Antigen, Diagnostic

## 2020-10-30 ENCOUNTER — TELEPHONE (OUTPATIENT)
Dept: UROLOGY | Facility: CLINIC | Age: 70
End: 2020-10-30

## 2020-10-30 ENCOUNTER — DOCUMENTATION ONLY (OUTPATIENT)
Dept: UROLOGY | Facility: CLINIC | Age: 70
End: 2020-10-30

## 2021-01-11 ENCOUNTER — TELEPHONE (OUTPATIENT)
Dept: NEUROLOGY | Facility: CLINIC | Age: 71
End: 2021-01-11

## 2021-01-27 ENCOUNTER — TELEPHONE (OUTPATIENT)
Dept: NEUROLOGY | Facility: CLINIC | Age: 71
End: 2021-01-27
Payer: OTHER GOVERNMENT

## 2021-01-27 NOTE — TELEPHONE ENCOUNTER
----- Message from Mary Lo sent at 1/27/2021  5:01 PM CST -----  Appointment  Request      Who called:patient  Reason:Reschedule upcoming appointment scheduled on 2/4/21  New or Existing Patient:existing  Callback or Gerardchnawaf response:callback  Best contact number:[phone]  Additional information:

## 2021-01-28 ENCOUNTER — TELEPHONE (OUTPATIENT)
Dept: NEUROLOGY | Facility: CLINIC | Age: 71
End: 2021-01-28

## 2021-02-04 ENCOUNTER — OFFICE VISIT (OUTPATIENT)
Dept: NEUROLOGY | Facility: CLINIC | Age: 71
End: 2021-02-04
Payer: MEDICARE

## 2021-02-04 DIAGNOSIS — E11.65 TYPE 2 DIABETES MELLITUS WITH HYPERGLYCEMIA, WITHOUT LONG-TERM CURRENT USE OF INSULIN: ICD-10-CM

## 2021-02-04 DIAGNOSIS — D84.9 IMMUNOSUPPRESSION: ICD-10-CM

## 2021-02-04 DIAGNOSIS — G70.00 MYASTHENIA GRAVIS: ICD-10-CM

## 2021-02-04 DIAGNOSIS — G70.00 MYASTHENIA GRAVIS, ACHR ANTIBODY POSITIVE: Primary | Chronic | ICD-10-CM

## 2021-02-04 DIAGNOSIS — I10 ESSENTIAL HYPERTENSION: Chronic | ICD-10-CM

## 2021-02-04 DIAGNOSIS — E78.00 PURE HYPERCHOLESTEROLEMIA: Chronic | ICD-10-CM

## 2021-02-04 PROCEDURE — 99214 OFFICE O/P EST MOD 30 MIN: CPT | Mod: 95,,, | Performed by: PSYCHIATRY & NEUROLOGY

## 2021-02-04 PROCEDURE — 99214 PR OFFICE/OUTPT VISIT, EST, LEVL IV, 30-39 MIN: ICD-10-PCS | Mod: 95,,, | Performed by: PSYCHIATRY & NEUROLOGY

## 2021-02-04 RX ORDER — AZATHIOPRINE 50 MG/1
100 TABLET ORAL DAILY
Qty: 180 TABLET | Refills: 3
Start: 2021-02-04 | End: 2021-06-08

## 2021-03-03 ENCOUNTER — OFFICE VISIT (OUTPATIENT)
Dept: UROLOGY | Facility: CLINIC | Age: 71
End: 2021-03-03
Payer: OTHER GOVERNMENT

## 2021-03-03 DIAGNOSIS — R39.198 DIFFICULTY VOIDING: ICD-10-CM

## 2021-03-03 DIAGNOSIS — C61 PROSTATE CANCER: Primary | ICD-10-CM

## 2021-03-03 PROCEDURE — 99214 PR OFFICE/OUTPT VISIT, EST, LEVL IV, 30-39 MIN: ICD-10-PCS | Mod: S$GLB,,, | Performed by: NURSE PRACTITIONER

## 2021-03-03 PROCEDURE — 99214 OFFICE O/P EST MOD 30 MIN: CPT | Mod: S$GLB,,, | Performed by: NURSE PRACTITIONER

## 2021-03-03 RX ORDER — ALFUZOSIN HYDROCHLORIDE 10 MG/1
10 TABLET, EXTENDED RELEASE ORAL DAILY
Qty: 30 TABLET | Refills: 5 | Status: SHIPPED | OUTPATIENT
Start: 2021-03-03 | End: 2023-11-30

## 2021-03-04 ENCOUNTER — TELEPHONE (OUTPATIENT)
Dept: UROLOGY | Facility: CLINIC | Age: 71
End: 2021-03-04

## 2021-03-04 LAB — PSA, DIAGNOSTIC: 3.56 NG/ML (ref 0–4)

## 2021-06-08 ENCOUNTER — PATIENT MESSAGE (OUTPATIENT)
Dept: NEUROLOGY | Facility: CLINIC | Age: 71
End: 2021-06-08

## 2021-06-08 DIAGNOSIS — G70.00 MYASTHENIA GRAVIS, ACHR ANTIBODY POSITIVE: Primary | ICD-10-CM

## 2022-03-31 ENCOUNTER — OFFICE VISIT (OUTPATIENT)
Dept: NEUROLOGY | Facility: CLINIC | Age: 72
End: 2022-03-31
Payer: OTHER GOVERNMENT

## 2022-03-31 DIAGNOSIS — G70.00 MYASTHENIA GRAVIS, ACHR ANTIBODY POSITIVE: Primary | ICD-10-CM

## 2022-03-31 DIAGNOSIS — G70.00 MYASTHENIA GRAVIS: ICD-10-CM

## 2022-03-31 PROCEDURE — 99214 PR OFFICE/OUTPT VISIT, EST, LEVL IV, 30-39 MIN: ICD-10-PCS | Mod: S$GLB,,, | Performed by: PSYCHIATRY & NEUROLOGY

## 2022-03-31 PROCEDURE — 99999 PR PBB SHADOW E&M-EST. PATIENT-LVL III: ICD-10-PCS | Mod: PBBFAC,,, | Performed by: PSYCHIATRY & NEUROLOGY

## 2022-03-31 PROCEDURE — 99214 OFFICE O/P EST MOD 30 MIN: CPT | Mod: S$GLB,,, | Performed by: PSYCHIATRY & NEUROLOGY

## 2022-03-31 PROCEDURE — 99999 PR PBB SHADOW E&M-EST. PATIENT-LVL III: CPT | Mod: PBBFAC,,, | Performed by: PSYCHIATRY & NEUROLOGY

## 2022-03-31 RX ORDER — AZATHIOPRINE 50 MG/1
TABLET ORAL
Qty: 180 TABLET | Refills: 3 | Status: SHIPPED | OUTPATIENT
Start: 2022-04-30 | End: 2023-11-30 | Stop reason: SDUPTHER

## 2022-03-31 RX ORDER — PYRIDOSTIGMINE BROMIDE 60 MG/1
60 TABLET ORAL
Qty: 90 TABLET | Refills: 11 | Status: SHIPPED | OUTPATIENT
Start: 2022-03-31

## 2022-03-31 RX ORDER — DAPAGLIFLOZIN AND METFORMIN HYDROCHLORIDE 10; 1000 MG/1; MG/1
1 TABLET, FILM COATED, EXTENDED RELEASE ORAL EVERY MORNING
COMMUNITY
Start: 2022-03-21

## 2022-03-31 NOTE — PROGRESS NOTES
Subjective:     Chief Complaint:  No chief complaint on file.  Interval History 03.31.22-   I have reviewed all relevant medical records in [facility]. Patient presents today for routine follow up regarding AChR antibody positive myasthenia gravis. He is controlled with 100mg Imuran daily however, he states in the evenings he finds he becomes tired more quickly. He states he does not need to use mestinon regularly and was not aware he could take Mestinon as needed. He states that he occasionally finds his muscles do not have the energy that he is use to having. He has recent labs taken at [facility] on 3/10/2022 WBC= 6.9, AST/ALT= 35/31. He denies any recent double vision but does endorse occassionally blurry vision but states he needs to have an updated eye exam. He also states his wife has noticed some occasional ptosis of the right eye. He does endorse some SOB on exertion however it improves with rest. He denies any choking episodes or any issues swallowing.       Interval History 2.4.2021 - I have reviewed all relevant medical records in [facility]. Seen today for routine follow up for ACh R Ab+ MG. Very well-controlled with Imuran 100 mg daily. Rarely uses Mestinon and is not taking any steroids. He is no long taking any IVIg infusions and has not noticed any relapse since removing that from his regimen. He denies any UE or LE weaknesses. He has no voice changes, no swallowing or chewing difficulties, no SOB or BETHEA, no diplopia or ptosis. No apparent side effects from Imuran. He had recent labs done that show WBC = 7.3 and AST/ALT = 36/32.    The patient location is: Home in [place]  The chief complaint leading to consultation is: ACh R Ab+ MG    Visit type: audiovisual    Face to Face time with patient: 20 minutes  25 minutes of total time spent on the encounter, which includes face to face time and non-face to face time preparing to see the patient (eg, review of tests), Obtaining and/or reviewing  separately obtained history, Documenting clinical information in the electronic or other health record, Independently interpreting results (not separately reported) and communicating results to the patient/family/caregiver, or Care coordination (not separately reported).     Each patient to whom he or she provides medical services by telemedicine is:  (1) informed of the relationship between the physician and patient and the respective role of any other health care provider with respect to management of the patient; and (2) notified that he or she may decline to receive medical services by telemedicine and may withdraw from such care at any time.    History of Present Illness:  I have reviewed all relevant medical records in [facility]. Otoniel Candelario is a 71 y.o. male who presents today for follow up of AChR Ab+ MG (binding and modulating), also SMAb+. Diagnosed in 2017, thymectomy (no thymoma) in 2018. Managed initially with steroids and IVIg, now on Imuran that was started over one year ago, off of steroids, and getting IVIg per [facility] Q8 weeks. No reported weakness or relapse in between Ig infusions. Tolerating infusions well but he does not report any weaknesses developing prior to Ig and not reporting any improvements after IVIg. Tolerating Imuran well (100 mg QHS) and needs no Mestinon for rescue. Onset of symptoms included UE and LE weaknesses as well as dysphagia and some voice changes. Little to no ocular weaknesses reported. Since last seen here by Dr. Jacques he has had no weaknesses at all and reports active days doing yard work, etc. No issues with walking, no falls. No dyspnea, no orthopnea, no dysphagia, no diplopia, no ptosis.    Diagnosed: March 2017  MG Type: Generalized (ptosis, dysphagia, dysarthria, generalized weakness)  Readmission: None  Antibody status: Striated Ab (+), Ach Binding (+), Ach Modul (+)  Thymoma: Thymectomy 7/16/18   Medications: IVIG 100g q8 weeks (through [facility], started  2017), Imuran 100 mg QHS   Acute treatments: None  Intubated: Never    Review of Systems  Review of Systems   Constitutional: Negative for activity change, fatigue and fever.   HENT: Negative for hearing loss, trouble swallowing and voice change.    Eyes: Negative for pain, redness and visual disturbance.   Respiratory: Negative for choking, chest tightness and shortness of breath.    Cardiovascular: Negative for chest pain.   Gastrointestinal: Negative for abdominal pain, nausea and vomiting.   Endocrine: Negative for cold intolerance.   Genitourinary: Negative for frequency.   Musculoskeletal: Negative for arthralgias, back pain, gait problem, joint swelling, myalgias and neck pain.   Skin: Negative for color change.   Allergic/Immunologic: Negative for immunocompromised state.   Neurological: Negative for dizziness, seizures, speech difficulty, weakness, numbness and headaches.   Hematological: Negative for adenopathy.   Psychiatric/Behavioral: Negative for agitation, behavioral problems, dysphoric mood and suicidal ideas.        Objective:     There were no vitals filed for this visit.    Neurologic Exam     Mental Status   Oriented to person, place, and time.   Attention: normal.   Speech: speech is normal   Level of consciousness: alert  Knowledge: good.     Cranial Nerves     CN II   Visual fields full to confrontation.     CN III, IV, VI   Pupils are equal, round, and reactive to light.  Extraocular motions are normal.   Diplopia: none  Upgaze: normal    CN V   Facial sensation intact.     CN VII   Facial expression full, symmetric.     CN VIII   Hearing: intact    CN IX, X   CN IX normal.     CN XI   CN XI normal.     CN XII   CN XII normal.   Maintains upward eye gaze x 30 seconds without drop.     Motor Exam   Muscle bulk: normal  Overall muscle tone: normal    Strength   Strength 5/5 throughout.   Right deltoid: 4/5  Left deltoid: 4/5  Right biceps: 4/5  Left biceps: 4/5  Right quadriceps: 4/5  Left  quadriceps: 4/5  Faitable weakness of biceps brachii and deltoids bilateral         Sensory Exam   Light touch normal.   Vibration normal.     Gait, Coordination, and Reflexes     Gait  Gait: normal    Tremor   Resting tremor: absent  Intention tremor: absent  Action tremor: absent    Reflexes   Right brachioradialis: 2+  Left brachioradialis: 2+  Right biceps: 2+  Left biceps: 2+  Right triceps: 2+  Left triceps: 2+  Right patellar: 2+  Left patellar: 2+  Right achilles: 2+  Left achilles: 2+      Physical Exam  Vitals reviewed.   Constitutional:       Appearance: He is well-developed.   HENT:      Head: Normocephalic and atraumatic.   Eyes:      Extraocular Movements: EOM normal.      Pupils: Pupils are equal, round, and reactive to light.   Neck:      Thyroid: No thyromegaly.   Cardiovascular:      Rate and Rhythm: Normal rate.   Pulmonary:      Effort: Pulmonary effort is normal.   Abdominal:      Palpations: Abdomen is soft.   Musculoskeletal:      Cervical back: Normal range of motion and neck supple.   Lymphadenopathy:      Cervical: No cervical adenopathy.   Skin:     General: Skin is warm and dry.   Neurological:      Mental Status: He is oriented to person, place, and time.      Gait: Gait is intact.      Deep Tendon Reflexes: Strength normal.      Reflex Scores:       Tricep reflexes are 2+ on the right side and 2+ on the left side.       Bicep reflexes are 2+ on the right side and 2+ on the left side.       Brachioradialis reflexes are 2+ on the right side and 2+ on the left side.       Patellar reflexes are 2+ on the right side and 2+ on the left side.       Achilles reflexes are 2+ on the right side and 2+ on the left side.  Psychiatric:         Speech: Speech normal.         Behavior: Behavior normal.         Thought Content: Thought content normal.           Lab Results   Component Value Date    WBC 6.98 10/07/2019    HGB 14.4 10/07/2019    HCT 45.0 10/07/2019     10/07/2019    ALT 21  10/07/2019    AST 31 10/07/2019     10/07/2019    K 4.4 10/07/2019     10/07/2019    CREATININE 1.0 10/07/2019    BUN 13 10/07/2019    CO2 32 (H) 10/07/2019    HGBA1C 10.2 (H) 07/19/2018         Assessment and Plan:   Myasthenia Gravis:    -Continue Imuran 100mg daily   -Discussed with patient that mestinon is to be used AS NEEDED and that he can use it when he starts to have signs of decreased energy and then can be taken again every 4-6 hours. He states he was not aware of this and will try to use this more often.    -Discussed with the patient to report to the hospital and let us know if he begins having shortness of breath without exertion    Problem List Items Addressed This Visit        Neuro    Myasthenia gravis, AChR antibody positive - Primary (Chronic)    Overview     Diagnosed: March 2017  MG Type: Generalized  Readmission: None  Antibody status: Striated Ab (+), Ach Binding (+), Ach Modul (+)  Thymoma: Thymectomy 7/16/18   Intubated: Never           Relevant Medications    pyridostigmine (MESTINON) 60 mg Tab    azaTHIOprine (IMURAN) 50 mg Tab (Start on 4/30/2022)      Other Visit Diagnoses     Myasthenia gravis        Relevant Medications    azaTHIOprine (IMURAN) 50 mg Tab (Start on 4/30/2022)        RTC in 6 months    Wenceslao Trejo MD  [facility]

## 2022-04-10 ENCOUNTER — HISTORICAL (OUTPATIENT)
Dept: ADMINISTRATIVE | Facility: HOSPITAL | Age: 72
End: 2022-04-10
Payer: OTHER GOVERNMENT

## 2022-04-30 VITALS
SYSTOLIC BLOOD PRESSURE: 142 MMHG | DIASTOLIC BLOOD PRESSURE: 92 MMHG | WEIGHT: 246.06 LBS | HEIGHT: 71 IN | BODY MASS INDEX: 34.45 KG/M2

## 2022-11-15 ENCOUNTER — HISTORICAL (OUTPATIENT)
Dept: ADMINISTRATIVE | Facility: HOSPITAL | Age: 72
End: 2022-11-15

## 2022-12-01 ENCOUNTER — TELEPHONE (OUTPATIENT)
Dept: NEUROLOGY | Facility: CLINIC | Age: 72
End: 2022-12-01
Payer: OTHER GOVERNMENT

## 2022-12-01 NOTE — TELEPHONE ENCOUNTER
----- Message from Frank Jones sent at 12/1/2022  8:41 AM CST -----  Type:  Sooner Apoointment Request    Caller is requesting a sooner appointment.  Caller declined first available appointment listed below.  Caller will not accept being placed on the waitlist and is requesting a message be sent to doctor.  Name of Caller: Otoniel  When is the first available appointment? 3-  Symptoms: follow up   Would the patient rather a call back or a response via [facility]?  Call back  Best Call Back Number: [phone]  Additional Information:  no

## 2022-12-12 ENCOUNTER — OFFICE VISIT (OUTPATIENT)
Dept: NEUROLOGY | Facility: CLINIC | Age: 72
End: 2022-12-12
Payer: OTHER GOVERNMENT

## 2022-12-12 VITALS
HEART RATE: 66 BPM | SYSTOLIC BLOOD PRESSURE: 139 MMHG | HEIGHT: 71 IN | DIASTOLIC BLOOD PRESSURE: 85 MMHG | BODY MASS INDEX: 35.98 KG/M2

## 2022-12-12 DIAGNOSIS — G70.00 MYASTHENIA GRAVIS, ACHR ANTIBODY POSITIVE: Chronic | ICD-10-CM

## 2022-12-12 PROCEDURE — 99215 OFFICE O/P EST HI 40 MIN: CPT | Mod: S$GLB,,, | Performed by: PSYCHIATRY & NEUROLOGY

## 2022-12-12 PROCEDURE — 99417 PR PROLONGED SVC, OUTPT, W/WO DIRECT PT CONTACT,  EA ADDTL 15 MIN: ICD-10-PCS | Mod: S$GLB,,, | Performed by: PSYCHIATRY & NEUROLOGY

## 2022-12-12 PROCEDURE — 99999 PR PBB SHADOW E&M-EST. PATIENT-LVL III: ICD-10-PCS | Mod: PBBFAC,,, | Performed by: PSYCHIATRY & NEUROLOGY

## 2022-12-12 PROCEDURE — 99417 PROLNG OP E/M EACH 15 MIN: CPT | Mod: S$GLB,,, | Performed by: PSYCHIATRY & NEUROLOGY

## 2022-12-12 PROCEDURE — 99215 PR OFFICE/OUTPT VISIT, EST, LEVL V, 40-54 MIN: ICD-10-PCS | Mod: S$GLB,,, | Performed by: PSYCHIATRY & NEUROLOGY

## 2022-12-12 PROCEDURE — 99999 PR PBB SHADOW E&M-EST. PATIENT-LVL III: CPT | Mod: PBBFAC,,, | Performed by: PSYCHIATRY & NEUROLOGY

## 2022-12-12 RX ORDER — TADALAFIL 5 MG/1
5 TABLET ORAL
COMMUNITY
Start: 2022-12-05

## 2022-12-12 RX ORDER — FINASTERIDE 5 MG/1
5 TABLET, FILM COATED ORAL
COMMUNITY
Start: 2022-09-30

## 2022-12-12 RX ORDER — NEISSERIA MENINGITIDIS SEROGROUP B NHBA FUSION PROTEIN ANTIGEN, NEISSERIA MENINGITIDIS SEROGROUP B FHBP FUSION PROTEIN ANTIGEN AND NEISSERIA MENINGITIDIS SEROGROUP B NADA PROTEIN ANTIGEN 50; 50; 50; 25 UG/.5ML; UG/.5ML; UG/.5ML; UG/.5ML
0.5 INJECTION, SUSPENSION INTRAMUSCULAR ONCE
Qty: 0.5 ML | Refills: 0 | Status: SHIPPED | OUTPATIENT
Start: 2022-12-12 | End: 2022-12-12

## 2022-12-12 RX ORDER — NEISSERIA MENINGITIDIS GROUP A CAPSULAR POLYSACCHARIDE DIPHTHERIA TOXOID CONJUGATE ANTIGEN, NEISSERIA MENINGITIDIS GROUP C CAPSULAR POLYSACCHARIDE DIPHTHERIA TOXOID CONJUGATE ANTIGEN, NEISSERIA MENINGITIDIS GROUP Y CAPSULAR POLYSACCHARIDE DIPHTHERIA TOXOID CONJUGATE ANTIGEN, AND NEISSERIA MENINGITIDIS GROUP W-135 CAPSULAR POLYSACCHARIDE DIPHTHERIA TOXOID CONJUGATE ANTIGEN 4; 4; 4; 4 UG/.5ML; UG/.5ML; UG/.5ML; UG/.5ML
0.5 INJECTION, SOLUTION INTRAMUSCULAR ONCE
Qty: 0.5 ML | Refills: 0 | Status: SHIPPED | OUTPATIENT
Start: 2022-12-12 | End: 2022-12-12

## 2022-12-12 RX ORDER — LANCETS 23 GAUGE
EACH MISCELLANEOUS 2 TIMES DAILY
COMMUNITY
Start: 2022-08-08

## 2022-12-12 NOTE — PROGRESS NOTES
Subjective:     Chief Complaint:  AChR Antibody Positive Myasthenia Gravis    Interval History 12/12/2022 - I have reviewed relevant medical records in [facility]. Here for follow up for AChR Ab+ MG. He notes that he has fluctuating weakness throughout the day which is somewhat relieved with the Mestinon but it seems to only benefit him for a short period of time. His current therapy is Imuran 100mg QHS, pyridostigmine 60mg Q4-6 hours PRN for weakness. He states when he feels tired he lays down because he is scared to fall. Per the patient and his wife this occurs more frequently than the patient admits. The patient mentioned that he saw advertisements for Vyvgart on TV and is curious to discuss. Per the patient he is close to [place].     Myasthenia Gravis Activities of Daily Living Scale     Grade   Function 0 1 2 3   Double Vision None Occasional, not every day Daily, but not constant Constant          Eyelid Droop None Occasional, not every day Daily, but not constant Constant          Talking Normal Intermittent slurring or nasal speech Constant slurring or nasal speech, but can be understood Speech difficult to understand          Chewing Normal Fatigues with solid food Fatigues with soft food Gastric tube          Swallowing Normal Chokes rarely Frequent choking requiring change of diet Gastric tube          Breathing Normal Shortness of breath on exertion Shortness of breath at rest Ventilator          Brushing teeth or hair Normal Requires extra effort but requires no rest period Rest periods needed Cannot do one or more of these functions          Ability to rise from chair or toilet Normal Sometimes uses arms Always uses arms Requires assistance          Total MG ADL Score 7           Interval History 2.4.2021 - I have reviewed all relevant medical records in [facility]. Seen today for routine follow up for ACh R Ab+ MG. Very well-controlled with Imuran 100 mg daily. Rarely uses Mestinon and is not taking any  steroids. He is no long taking any IVIg infusions and has not noticed any relapse since removing that from his regimen. He denies any UE or LE weaknesses. He has no voice changes, no swallowing or chewing difficulties, no SOB or BETHEA, no diplopia or ptosis. No apparent side effects from Imuran. He had recent labs done that show WBC = 7.3 and AST/ALT = 36/32.    The patient location is: Home in [place]  The chief complaint leading to consultation is: ACh R Ab+ MG    Visit type: audiovisual    Face to Face time with patient: 20 minutes  25 minutes of total time spent on the encounter, which includes face to face time and non-face to face time preparing to see the patient (eg, review of tests), Obtaining and/or reviewing separately obtained history, Documenting clinical information in the electronic or other health record, Independently interpreting results (not separately reported) and communicating results to the patient/family/caregiver, or Care coordination (not separately reported).     Each patient to whom he or she provides medical services by telemedicine is:  (1) informed of the relationship between the physician and patient and the respective role of any other health care provider with respect to management of the patient; and (2) notified that he or she may decline to receive medical services by telemedicine and may withdraw from such care at any time.    History of Present Illness:  I have reviewed all relevant medical records in [facility]. Otoniel Candelario is a 72 y.o. male who presents today for follow up of AChR Ab+ MG (binding and modulating), also SMAb+. Diagnosed in 2017, thymectomy (no thymoma) in 2018. Managed initially with steroids and IVIg, now on Imuran that was started over one year ago, off of steroids, and getting IVIg per [facility] Q8 weeks. No reported weakness or relapse in between Ig infusions. Tolerating infusions well but he does not report any weaknesses developing prior to Ig and  "not reporting any improvements after IVIg. Tolerating Imuran well (100 mg QHS) and needs no Mestinon for rescue. Onset of symptoms included UE and LE weaknesses as well as dysphagia and some voice changes. Little to no ocular weaknesses reported. Since last seen here by Dr. Jacques he has had no weaknesses at all and reports active days doing yard work, etc. No issues with walking, no falls. No dyspnea, no orthopnea, no dysphagia, no diplopia, no ptosis.    Diagnosed: March 2017  MG Type: Generalized (ptosis, dysphagia, dysarthria, generalized weakness)  Readmission: None  Antibody status: Striated Ab (+) 1:30562, Ach Binding (+) 66.2, Ach Modul (+) 96%  Thymoma: Thymectomy 7/16/18   Medications: IVIG 100g q8 weeks (through [facility], started 2017), Imuran 100 mg QHS   Acute treatments: None  Intubated: Never    Review of Systems  Review of Systems   Constitutional:  Negative for activity change, fatigue and fever.   HENT:  Negative for hearing loss, trouble swallowing and voice change.    Eyes:  Negative for pain, redness and visual disturbance.   Respiratory:  Negative for choking, chest tightness and shortness of breath.    Cardiovascular:  Negative for chest pain.   Gastrointestinal:  Negative for abdominal pain, nausea and vomiting.   Endocrine: Negative for cold intolerance.   Genitourinary:  Negative for frequency.   Musculoskeletal:  Negative for arthralgias, back pain, gait problem, joint swelling, myalgias and neck pain.   Skin:  Negative for color change.   Allergic/Immunologic: Negative for immunocompromised state.   Neurological:  Negative for dizziness, seizures, speech difficulty, weakness, numbness and headaches.   Hematological:  Negative for adenopathy.   Psychiatric/Behavioral:  Negative for agitation, behavioral problems, dysphoric mood and suicidal ideas.       Objective:     Vitals:    12/12/22 1339   BP: 139/85   Pulse: 66   Height: 5' 11" (1.803 m)       Neurologic Exam     Mental Status   Oriented " to person, place, and time.   Attention: normal.   Speech: speech is normal   Level of consciousness: alert  Knowledge: good.     Cranial Nerves     CN II   Visual fields full to confrontation.     CN III, IV, VI   Pupils are equal, round, and reactive to light.  Diplopia: left and right  Abnormal upgaze: Unable to maintain upward gaze.    CN V   Facial sensation intact.     CN VII   Facial expression full, symmetric.     CN VIII   Hearing: intact    CN IX, X   CN IX normal.     CN XI   CN XI normal.     CN XII   CN XII normal.     Motor Exam   Muscle bulk: normal  Overall muscle tone: normal    Strength   Strength 5/5 throughout. Fatigable weakness in deltoid, biceps, triceps     Sensory Exam   Light touch normal.   Vibration normal.     Gait, Coordination, and Reflexes     Gait  Gait: normal    Tremor   Resting tremor: absent  Intention tremor: absent  Action tremor: absent    Reflexes   Right brachioradialis: 2+  Left brachioradialis: 2+  Right biceps: 2+  Left biceps: 2+  Right triceps: 2+  Left triceps: 2+  Right patellar: 2+  Left patellar: 2+  Right achilles: 2+  Left achilles: 2+    Physical Exam  Vitals reviewed.   Constitutional:       Appearance: He is well-developed.   HENT:      Head: Normocephalic and atraumatic.   Eyes:      Pupils: Pupils are equal, round, and reactive to light.   Neck:      Thyroid: No thyromegaly.   Cardiovascular:      Rate and Rhythm: Normal rate.   Pulmonary:      Effort: Pulmonary effort is normal.   Abdominal:      Palpations: Abdomen is soft.   Musculoskeletal:      Cervical back: Normal range of motion and neck supple.   Lymphadenopathy:      Cervical: No cervical adenopathy.   Skin:     General: Skin is warm and dry.   Neurological:      Mental Status: He is oriented to person, place, and time.      Motor: Motor strength is normal.      Gait: Gait is intact.      Deep Tendon Reflexes:      Reflex Scores:       Tricep reflexes are 2+ on the right side and 2+ on the left  side.       Bicep reflexes are 2+ on the right side and 2+ on the left side.       Brachioradialis reflexes are 2+ on the right side and 2+ on the left side.       Patellar reflexes are 2+ on the right side and 2+ on the left side.       Achilles reflexes are 2+ on the right side and 2+ on the left side.  Psychiatric:         Speech: Speech normal.         Behavior: Behavior normal.         Thought Content: Thought content normal.         Lab Results   Component Value Date    WBC 6.98 10/07/2019    HGB 14.4 10/07/2019    HCT 45.0 10/07/2019     10/07/2019    ALT 21 10/07/2019    AST 31 10/07/2019     10/07/2019    K 4.4 10/07/2019     10/07/2019    CREATININE 1.0 10/07/2019    BUN 13 10/07/2019    CO2 32 (H) 10/07/2019    HGBA1C 10.2 (H) 07/19/2018         Assessment and Plan:     Problem List Items Addressed This Visit       Myasthenia gravis, AChR antibody positive (Chronic)    Overview     Diagnosed: March 2017  MG Type: Generalized  Readmission: None  Antibody status: Striated Ab (+), Ach Binding (+), Ach Modul (+)  Thymoma: Thymectomy 7/16/18   Intubated: Never    12/12/2022 MG-ADL- 7     Outside LFT's reviewed WNL  Discussion with the patient and his wife regarding the risk and benefit of the use of MG therapies which have newly been approved including soliris, ultomiris, and vyvgart.     The patient was informed that with the soliris and ultomiris the risk of the patient having a decreased immune response to encapsulated bacteria is increased and therefore the patient would need to be preventatively vaccinated for meningococcal meningitis.     Described the mechanisms of action to the patient as well as the scheduling of the infusion schedule.     Would prefer home infusions, patient signed all paperwork for Ultomiris at clinic visit today     If unable to get home infusions preferance of location:   1. [facility]  [facility]. [facility]  [facility]. [facility]  [facility]. [facility]         Current Assessment & Plan      "        Myasthenia Gravis IMPORTANT INFORMATION:     Elective intubation should be considered if serial measurements of the VC show values less than 20 mL/kg or if the NIF is worse than -30 cm H20 or is progressively worsening after serial evaluation.        Absolute Contraindicated Medications (Category 1) Contraindicated Medications (Category 2) Likely to Worsen MG Cautionary Medications  (Category 3) That May Worsen MG but are Usually Tolerated   Curare  Botulinum toxin  D-penicillamine  Interferon alpha     Aminoglycoside (Gentamycin, Kanamycin, Streptomycin, Neomycin, Tobramycin)  Macrolide (Erythromycin, Azithromycin, Telithromycin, Biaxin, Clarithromycin)  Fluoroquinolone - (Ciprofloxacin, Moxifloxacin, Levofloxacin, Delafloxacin, Norfloxacin, Prulifloxacin)  Quinine (Use only for Malaria)  Quinidine  Procainamide  Magnesium salts, IV magnesium replacement  Chloroquine  Hydroxychloroquine  Immune checkpoint inhibitors: Pembrolizumab (Keytruda), Nivolumab (Opdivo), Atezolizumab (Tecentriq), Avelumab (Bavencio), Durvalumab (Imfinzi), Ipilimumab (Yervoy)    Atropine   Corticosteroids  Desferrioxamine  Beta blockers  Statins  Iodinated radiologic contrast agents  Lithium  Benzodiazepines   Calcium Channel Blockers (verapamil)   Doxacurium  Neuromuscular blockades (Succinylcholine, Cisatracurium, Rocuronium, Vecuronium, Atracurium)  Diclomine         THIS PATIENT HAS MYASTHENIA GRAVIS     USE THESE DRUGS WITH CAUTION   1. Antibiotics of the Following Classes               A. Aminoglycosides (end in "mycin", "micin" e.g. Neomycin, tobramycin, gentamicin)               B. Flagyl (metronidazole)               C. Macrolides (e.g. Erythromycin, azithromycin (Zithromax), clarithromycin)   2. Beta Blockers (oral, parenteral and ophthalmic preparations)               A. (end in "olol" e.g. Atenolol, labetalol, metoprolol, propranolol, sotalol, timolol, etc)   3. Calcium Channel Blockers (end in "ipine" e.g. Amlodipine " "(Norvasc), nicardipine, nifedipine (Procardia), felodipine (Plendil))   4. Corticosteroids & ACTH   5. Interferons   6. Magnesium   7. Narcotic analgesics (if there is respiratory compromise e.g. Demerol, morphine)   8. Phenothiazines (end in "zine" e.g. Compazine, chlorpromazine (Thorazine), fluphenazine (Prolixin), perphenazine (Trilafon), Sparine)   9. Respiratory Depressants   10. Sedatives and Hypnotics       THESE DRUGS SHOULD *NOT* BE USED IN PATIENTS WITH MYASTHENIA GRAVIS WITHOUT PRIOR DISCUSSION WITH A NEUROMUSCULAR SPECIALIST   1. Ketolides (e.g. Telithromycin) - FDA BLACK BOX WARNING - Do NOT Use   2. Fluoroquinolones (end in "acin" e.g. Levofloxacin (Levaquin), ciprofloxacin (CIPRO), moxifloxacin (Avelox) - FDA BLACK BOX WARNING   3. Botulinum toxin   4. D-Penicillamine       NURSES -- TRIPLE CHECK BEFORE GIVING THE FOLLOWING DUE TO THE HIGH PROBABILITY OF PROLONGED WEAKNESS OR MG CRISIS   1. Neuromuscular blocking agent (both depolarizing and non-depolarizing)               A. Succinylcholine-like               B. Curare-like   2. Quinidine   3. Quinine              A total of at least 70 minutes was spent on this encounter and included charts and records review from other medical providers, imaging and diagnostic testing results review, patient interview and examination and discussion, documentation, signing of therapy enrollment forms and consent forms, and discussion of planning with the infusion nurse coordinator.    RTC in 3-4 months    LUIS A Eli MD  [facility]  "

## 2022-12-12 NOTE — ASSESSMENT & PLAN NOTE
"        Myasthenia Gravis IMPORTANT INFORMATION:     Elective intubation should be considered if serial measurements of the VC show values less than 20 mL/kg or if the NIF is worse than -30 cm H20 or is progressively worsening after serial evaluation.        Absolute Contraindicated Medications (Category 1) Contraindicated Medications (Category 2) Likely to Worsen MG Cautionary Medications  (Category 3) That May Worsen MG but are Usually Tolerated   · Curare  · Botulinum toxin  · D-penicillamine  · Interferon alpha     · Aminoglycoside (Gentamycin, Kanamycin, Streptomycin, Neomycin, Tobramycin)  · Macrolide (Erythromycin, Azithromycin, Telithromycin, Biaxin, Clarithromycin)  · Fluoroquinolone - (Ciprofloxacin, Moxifloxacin, Levofloxacin, Delafloxacin, Norfloxacin, Prulifloxacin)  · Quinine (Use only for Malaria)  · Quinidine  · Procainamide  · Magnesium salts, IV magnesium replacement  · Chloroquine  · Hydroxychloroquine  · Immune checkpoint inhibitors: Pembrolizumab (Keytruda), Nivolumab (Opdivo), Atezolizumab (Tecentriq), Avelumab (Bavencio), Durvalumab (Imfinzi), Ipilimumab (Yervoy)    · Atropine   · Corticosteroids  · Desferrioxamine  · Beta blockers  · Statins  · Iodinated radiologic contrast agents  · Lithium  · Benzodiazepines   · Calcium Channel Blockers (verapamil)   · Doxacurium  · Neuromuscular blockades (Succinylcholine, Cisatracurium, Rocuronium, Vecuronium, Atracurium)  · Diclomine         THIS PATIENT HAS MYASTHENIA GRAVIS     USE THESE DRUGS WITH CAUTION   1. Antibiotics of the Following Classes               A. Aminoglycosides (end in "mycin", "micin" e.g. Neomycin, tobramycin, gentamicin)               B. Flagyl (metronidazole)               C. Macrolides (e.g. Erythromycin, azithromycin (Zithromax), clarithromycin)   2. Beta Blockers (oral, parenteral and ophthalmic preparations)               A. (end in "olol" e.g. Atenolol, labetalol, metoprolol, propranolol, sotalol, timolol, etc)   3. Calcium " "Channel Blockers (end in "ipine" e.g. Amlodipine (Norvasc), nicardipine, nifedipine (Procardia), felodipine (Plendil))   4. Corticosteroids & ACTH   5. Interferons   6. Magnesium   7. Narcotic analgesics (if there is respiratory compromise e.g. Demerol, morphine)   8. Phenothiazines (end in "zine" e.g. Compazine, chlorpromazine (Thorazine), fluphenazine (Prolixin), perphenazine (Trilafon), Sparine)   9. Respiratory Depressants   10. Sedatives and Hypnotics       THESE DRUGS SHOULD *NOT* BE USED IN PATIENTS WITH MYASTHENIA GRAVIS WITHOUT PRIOR DISCUSSION WITH A NEUROMUSCULAR SPECIALIST   1. Ketolides (e.g. Telithromycin) - FDA BLACK BOX WARNING - Do NOT Use   2. Fluoroquinolones (end in "acin" e.g. Levofloxacin (Levaquin), ciprofloxacin (CIPRO), moxifloxacin (Avelox) - FDA BLACK BOX WARNING   3. Botulinum toxin   4. D-Penicillamine       NURSES -- TRIPLE CHECK BEFORE GIVING THE FOLLOWING DUE TO THE HIGH PROBABILITY OF PROLONGED WEAKNESS OR MG CRISIS   1. Neuromuscular blocking agent (both depolarizing and non-depolarizing)               A. Succinylcholine-like               B. Curare-like   2. Quinidine   3. Quinine      "

## 2022-12-14 ENCOUNTER — TELEPHONE (OUTPATIENT)
Dept: NEUROLOGY | Facility: CLINIC | Age: 72
End: 2022-12-14
Payer: OTHER GOVERNMENT

## 2022-12-19 RX ORDER — SODIUM CHLORIDE 0.9 % (FLUSH) 0.9 %
10 SYRINGE (ML) INJECTION
OUTPATIENT
Start: 2022-12-19

## 2022-12-19 RX ORDER — DIPHENHYDRAMINE HYDROCHLORIDE 50 MG/ML
25 INJECTION INTRAMUSCULAR; INTRAVENOUS ONCE AS NEEDED
Start: 2022-12-19

## 2022-12-19 RX ORDER — HEPARIN 100 UNIT/ML
500 SYRINGE INTRAVENOUS
OUTPATIENT
Start: 2022-12-19

## 2022-12-19 RX ORDER — ACETAMINOPHEN 325 MG/1
650 TABLET ORAL ONCE AS NEEDED
Start: 2022-12-19

## 2022-12-22 ENCOUNTER — TELEPHONE (OUTPATIENT)
Dept: NEUROLOGY | Facility: CLINIC | Age: 72
End: 2022-12-22
Payer: OTHER GOVERNMENT

## 2022-12-22 NOTE — TELEPHONE ENCOUNTER
----- Message from Eli Abrams sent at 12/22/2022  1:50 PM CST -----  Regarding: Rx  Contact: [phone]  Pt is calling stating that the pharmacy did not receive meningococcal polysaccharide (MENACTRA, PF,) 4 mcg/0.5 mL injection. Please call to discuss further @ [phone]

## 2022-12-30 ENCOUNTER — TELEPHONE (OUTPATIENT)
Dept: NEUROLOGY | Facility: CLINIC | Age: 72
End: 2022-12-30
Payer: OTHER GOVERNMENT

## 2022-12-30 NOTE — TELEPHONE ENCOUNTER
----- Message from Leanne Hernandez MA sent at 12/30/2022 11:48 AM CST -----  Contact: [phone]    ----- Message -----  From: Heather Ndiaye  Sent: 12/30/2022  11:43 AM CST  To: Neil King Staff, Rut Palacios Staff    Pt, is having problems with pharmacy issuing his shot orders of:     meningococcal polysaccharide (MENACTRA, PF,) 4 mcg/0.5 mL injection    meningococcal group B vaccine, PF, (BEXSERO) injection     [phone]

## 2023-01-03 ENCOUNTER — TELEPHONE (OUTPATIENT)
Dept: NEUROLOGY | Facility: CLINIC | Age: 73
End: 2023-01-03
Payer: OTHER GOVERNMENT

## 2023-01-03 NOTE — TELEPHONE ENCOUNTER
I placed a call to patient who stated that [facility] does not have vaccines available.  I placed a call to [facility] pharmacy and was informed by the pharmacist that both vaccines are on back order and they do not have a date on when they will arrive to the pharmacy.  I will coordinate with Elena to see if they can assist with getting this patient vaccinated.  I called patient back to inform him of this.  He stated he would like to try his PCP office first.  He will call them and ask if they can administer the vaccines and will let me know and provide me with a fax number.  If they can, I will ask Dr. Trejo for order so I can fax them to PCP

## 2023-01-04 NOTE — TELEPHONE ENCOUNTER
----- Message from Fanny Currie sent at 1/3/2023  1:42 PM CST -----  Regarding: insurance denied medication  Contact: [phone] @  Pt requesting a call back to discuss medication Ulormiris that has been denied by the insurance. Pt possibly looking for an alternative medication that would covered.Please call to discuss further.

## 2023-01-06 ENCOUNTER — TELEPHONE (OUTPATIENT)
Dept: NEUROLOGY | Facility: CLINIC | Age: 73
End: 2023-01-06
Payer: OTHER GOVERNMENT

## 2023-01-06 NOTE — TELEPHONE ENCOUNTER
Called patient to schedule virtual follow-up. Patient agreed on 1/13 at 10:00 AM. Informed patient that slot is not open yet and appointment will appear within 7 days of the appointment.

## 2023-01-10 ENCOUNTER — TELEPHONE (OUTPATIENT)
Dept: NEUROLOGY | Facility: CLINIC | Age: 73
End: 2023-01-10
Payer: OTHER GOVERNMENT

## 2023-01-10 NOTE — TELEPHONE ENCOUNTER
----- Message from Estevan Kent sent at 1/10/2023  1:31 PM CST -----  Regarding: Pt Advice  Contact: pt [phone]  Pt is calling about infusion that is not approved by [facility], pt is needed to know if he still needs to have meningitis shot

## 2023-01-10 NOTE — TELEPHONE ENCOUNTER
----- Message from Gayla Sidhu sent at 1/10/2023  3:24 PM CST -----  Contact: pt  Rx refill         meningococcal polysaccharide (MENACTRA, PF,) 4 mcg/0.5 mL injection            [facility]/pharmacy #[id number] - [place] - [address]  [address] [place] [address]  Phone: [phone] Fax: [phone]

## 2023-01-10 NOTE — TELEPHONE ENCOUNTER
Called pt to offer earlier virtual appointment of this Thursday at 9:30 AM. Pt accepted and I informed that his appointment has now been changed. Pt verbalized understanding.

## 2023-01-12 ENCOUNTER — OFFICE VISIT (OUTPATIENT)
Dept: NEUROLOGY | Facility: CLINIC | Age: 73
End: 2023-01-12
Payer: OTHER GOVERNMENT

## 2023-01-12 DIAGNOSIS — G70.00 MYASTHENIA GRAVIS, ACHR ANTIBODY POSITIVE: Chronic | ICD-10-CM

## 2023-01-12 DIAGNOSIS — I10 ESSENTIAL HYPERTENSION: Chronic | ICD-10-CM

## 2023-01-12 PROCEDURE — 99214 PR OFFICE/OUTPT VISIT, EST, LEVL IV, 30-39 MIN: ICD-10-PCS | Mod: 95,,, | Performed by: PHYSICIAN ASSISTANT

## 2023-01-12 PROCEDURE — 99214 OFFICE O/P EST MOD 30 MIN: CPT | Mod: 95,,, | Performed by: PHYSICIAN ASSISTANT

## 2023-01-12 NOTE — PROGRESS NOTES
Subjective:     Chief Complaint:  AChR Antibody Positive Myasthenia Gravis    Interval History 01/12/2023-  I have reviewed all relevant medical history in Epic, patient is presenting to inquire more information regarding his soliris infusions. His current therapy regimen is Imuran 100mg and Mestinon 60mg Q4-6 hours PRN for weakness. Previously the patient requested to try the Ultomiris instead however due to insurance he needs to try soliris rather than ultomiris. The patient had asked questions regarding his insurance coverage. He finds his arms and legs still have weakness and when this occurs he has to sit down and rest.    Myasthenia Gravis Activities of Daily Living Scale     Grade   Function 0 1 2 3   Double Vision None Occasional, not every day Daily, but not constant Constant          Eyelid Droop None Occasional, not every day Daily, but not constant Constant          Talking Normal Intermittent slurring or nasal speech Constant slurring or nasal speech, but can be understood Speech difficult to understand          Chewing Normal Fatigues with solid food Fatigues with soft food Gastric tube          Swallowing Normal Chokes rarely Frequent choking requiring change of diet Gastric tube          Breathing Normal Shortness of breath on exertion Shortness of breath at rest Ventilator          Brushing teeth or hair Normal Requires extra effort but requires no rest period Rest periods needed Cannot do one or more of these functions          Ability to rise from chair or toilet Normal Sometimes uses arms Always uses arms Requires assistance          Total MG ADL Score 2           Interval History 12/12/2022 - I have reviewed relevant medical records in Epic. Here for follow up for AChR Ab+ MG. He notes that he has fluctuating weakness throughout the day which is somewhat relieved with the Mestinon but it seems to only benefit him for a short period of time. His current therapy is Imuran 100mg QHS,  pyridostigmine 60mg Q4-6 hours PRN for weakness. He states when he feels tired he lays down because he is scared to fall. Per the patient and his wife this occurs more frequently than the patient admits. The patient mentioned that he saw advertisements for V[employer] on TV and is curious to discuss. Per the patient he is close to [place].     Myasthenia Gravis Activities of Daily Living Scale     Grade   Function 0 1 2 3   Double Vision None Occasional, not every day Daily, but not constant Constant          Eyelid Droop None Occasional, not every day Daily, but not constant Constant          Talking Normal Intermittent slurring or nasal speech Constant slurring or nasal speech, but can be understood Speech difficult to understand          Chewing Normal Fatigues with solid food Fatigues with soft food Gastric tube          Swallowing Normal Chokes rarely Frequent choking requiring change of diet Gastric tube          Breathing Normal Shortness of breath on exertion Shortness of breath at rest Ventilator          Brushing teeth or hair Normal Requires extra effort but requires no rest period Rest periods needed Cannot do one or more of these functions          Ability to rise from chair or toilet Normal Sometimes uses arms Always uses arms Requires assistance          Total MG ADL Score 7           Interval History 2.4.2021 - I have reviewed all relevant medical records in [facility]. Seen today for routine follow up for ACh R Ab+ MG. Very well-controlled with Imuran 100 mg daily. Rarely uses Mestinon and is not taking any steroids. He is no long taking any IVIg infusions and has not noticed any relapse since removing that from his regimen. He denies any UE or LE weaknesses. He has no voice changes, no swallowing or chewing difficulties, no SOB or BETHEA, no diplopia or ptosis. No apparent side effects from Imuran. He had recent labs done that show WBC = 7.3 and AST/ALT = 36/32.    The patient location is: Home in  [place]  The chief complaint leading to consultation is: ACh R Ab+ MG    Visit type: audiovisual    Face to Face time with patient: 20 minutes  25 minutes of total time spent on the encounter, which includes face to face time and non-face to face time preparing to see the patient (eg, review of tests), Obtaining and/or reviewing separately obtained history, Documenting clinical information in the electronic or other health record, Independently interpreting results (not separately reported) and communicating results to the patient/family/caregiver, or Care coordination (not separately reported).     Each patient to whom he or she provides medical services by telemedicine is:  (1) informed of the relationship between the physician and patient and the respective role of any other health care provider with respect to management of the patient; and (2) notified that he or she may decline to receive medical services by telemedicine and may withdraw from such care at any time.    History of Present Illness:  I have reviewed all relevant medical records in [facility]. Otoniel Candelario is a 72 y.o. male who presents today for follow up of AChR Ab+ MG (binding and modulating), also SMAb+. Diagnosed in 2017, thymectomy (no thymoma) in 2018. Managed initially with steroids and IVIg, now on Imuran that was started over one year ago, off of steroids, and getting IVIg per [facility] Q8 weeks. No reported weakness or relapse in between Ig infusions. Tolerating infusions well but he does not report any weaknesses developing prior to Ig and not reporting any improvements after IVIg. Tolerating Imuran well (100 mg QHS) and needs no Mestinon for rescue. Onset of symptoms included UE and LE weaknesses as well as dysphagia and some voice changes. Little to no ocular weaknesses reported. Since last seen here by Dr. Jacques he has had no weaknesses at all and reports active days doing yard work, etc. No issues with walking, no falls. No  dyspnea, no orthopnea, no dysphagia, no diplopia, no ptosis.    Diagnosed: March 2017  MG Type: Generalized (ptosis, dysphagia, dysarthria, generalized weakness)  Readmission: None  Antibody status: Striated Ab (+) 1:06520, Ach Binding (+) 66.2, Ach Modul (+) 96%  Thymoma: Thymectomy 7/16/18   Medications: IVIG 100g q8 weeks (through [facility], started 2017), Imuran 100 mg QHS   Acute treatments: None  Intubated: Never    Review of Systems  Review of Systems   Constitutional:  Negative for activity change, fatigue and fever.   HENT:  Negative for hearing loss, trouble swallowing and voice change.    Eyes:  Negative for pain, redness and visual disturbance.   Respiratory:  Negative for choking, chest tightness and shortness of breath.    Cardiovascular:  Negative for chest pain.   Gastrointestinal:  Negative for abdominal pain, nausea and vomiting.   Endocrine: Negative for cold intolerance.   Genitourinary:  Negative for frequency.   Musculoskeletal:  Negative for arthralgias, back pain, gait problem, joint swelling, myalgias and neck pain.   Skin:  Negative for color change.   Allergic/Immunologic: Negative for immunocompromised state.   Neurological:  Negative for dizziness, seizures, speech difficulty, weakness, numbness and headaches.   Hematological:  Negative for adenopathy.   Psychiatric/Behavioral:  Negative for agitation, behavioral problems, dysphoric mood and suicidal ideas.       Objective:     There were no vitals filed for this visit.      Neurologic Exam     Mental Status   Oriented to person, place, and time.   Attention: normal.   Speech: speech is normal   Level of consciousness: alert  Knowledge: good.     Cranial Nerves     CN II   Visual fields full to confrontation.     CN III, IV, VI   Pupils are equal, round, and reactive to light.  Diplopia: left and right  Abnormal upgaze: Unable to maintain upward gaze.    CN V   Facial sensation intact.     CN VII   Facial expression full, symmetric.     CN  VIII   Hearing: intact    CN IX, X   CN IX normal.     CN XI   CN XI normal.     CN XII   CN XII normal.     Motor Exam   Muscle bulk: normal  Overall muscle tone: normal    Strength   Strength 5/5 throughout. Fatigable weakness in deltoid, biceps, triceps     Sensory Exam   Light touch normal.   Vibration normal.     Gait, Coordination, and Reflexes     Gait  Gait: normal    Tremor   Resting tremor: absent  Intention tremor: absent  Action tremor: absent    Reflexes   Right brachioradialis: 2+  Left brachioradialis: 2+  Right biceps: 2+  Left biceps: 2+  Right triceps: 2+  Left triceps: 2+  Right patellar: 2+  Left patellar: 2+  Right achilles: 2+  Left achilles: 2+    Physical Exam  Vitals reviewed.   Constitutional:       Appearance: He is well-developed.   HENT:      Head: Normocephalic and atraumatic.   Eyes:      Pupils: Pupils are equal, round, and reactive to light.   Neck:      Thyroid: No thyromegaly.   Cardiovascular:      Rate and Rhythm: Normal rate.   Pulmonary:      Effort: Pulmonary effort is normal.   Abdominal:      Palpations: Abdomen is soft.   Musculoskeletal:      Cervical back: Normal range of motion and neck supple.   Lymphadenopathy:      Cervical: No cervical adenopathy.   Skin:     General: Skin is warm and dry.   Neurological:      Mental Status: He is oriented to person, place, and time.      Motor: Motor strength is normal.      Gait: Gait is intact.      Deep Tendon Reflexes:      Reflex Scores:       Tricep reflexes are 2+ on the right side and 2+ on the left side.       Bicep reflexes are 2+ on the right side and 2+ on the left side.       Brachioradialis reflexes are 2+ on the right side and 2+ on the left side.       Patellar reflexes are 2+ on the right side and 2+ on the left side.       Achilles reflexes are 2+ on the right side and 2+ on the left side.  Psychiatric:         Speech: Speech normal.         Behavior: Behavior normal.         Thought Content: Thought content  normal.         Lab Results   Component Value Date    WBC 6.98 10/07/2019    HGB 14.4 10/07/2019    HCT 45.0 10/07/2019     10/07/2019    ALT 21 10/07/2019    AST 31 10/07/2019     10/07/2019    K 4.4 10/07/2019     10/07/2019    CREATININE 1.0 10/07/2019    BUN 13 10/07/2019    CO2 32 (H) 10/07/2019    HGBA1C 10.2 (H) 07/19/2018         Assessment and Plan:     Problem List Items Addressed This Visit          Neuro    Myasthenia gravis, AChR antibody positive (Chronic)    Overview     Diagnosed: March 2017  MG Type: Generalized  Readmission: None  Antibody status: Striated Ab (+), Ach Binding (+), Ach Modul (+)  Thymoma: Thymectomy 7/16/18   Intubated: Never    12/12/2022 MG-ADL- 7   1/12/2023 MG-ADL- 2    Outside LFT's reviewed WNL  Discussion with the patient and his wife regarding the risk and benefit of the use of MG therapies which have newly been approved including soliris, ultomiris, and vyvgart.     The patient was informed that with the soliris and ultomiris the risk of the patient having a decreased immune response to encapsulated bacteria is increased and therefore the patient would need to be preventatively vaccinated for meningococcal meningitis.     Described the mechanisms of action to the patient as well as the scheduling of the infusion schedule.     Would prefer home infusions, patient signed all paperwork for Ultomiris at clinic visit today     If unable to get home infusions preferance of location:   1. [facility]  [facility]. [facility]  [facility]. [facility]  [facility]. [facility]             Current Assessment & Plan     Continue to monitor WBC and LFTs patient will do this through his primary care doctor     Current Therapy Regimen:   Imuran 100mg  Mestinon 60mg PRN q4-6 hours for weakness    Patient does not want to continue to try to do infusions due to improvement of his symptoms and cost. Discussed with the patient that there are cost assistance programs but he is not interested at this time.  "    Discussed with the patient warning signs for MG crisis or exacerbation and need for immediate presentation to ED for evaluation if any of those symptoms begin to occur. Patient voiced understanding.     Follow up in 3-4 months or sooner if needed.     Myasthenia Gravis IMPORTANT INFORMATION:     Elective intubation should be considered if serial measurements of the VC show values less than 20 mL/kg or if the NIF is worse than -30 cm H20 or is progressively worsening after serial evaluation.        Absolute Contraindicated Medications (Category 1) Contraindicated Medications (Category 2) Likely to Worsen MG Cautionary Medications  (Category 3) That May Worsen MG but are Usually Tolerated   Curare  Botulinum toxin  D-penicillamine  Interferon alpha     Aminoglycoside (Gentamycin, Kanamycin, Streptomycin, Neomycin, Tobramycin)  Macrolide (Erythromycin, Azithromycin, Telithromycin, Biaxin, Clarithromycin)  Fluoroquinolone - (Ciprofloxacin, Moxifloxacin, Levofloxacin, Delafloxacin, Norfloxacin, Prulifloxacin)  Quinine (Use only for Malaria)  Quinidine  Procainamide  Magnesium salts, IV magnesium replacement  Chloroquine  Hydroxychloroquine  Immune checkpoint inhibitors: Pembrolizumab (Keytruda), Nivolumab (Opdivo), Atezolizumab (Tecentriq), Avelumab (Bavencio), Durvalumab (Imfinzi), Ipilimumab (Yervoy)    Atropine   Corticosteroids  Desferrioxamine  Beta blockers  Statins  Iodinated radiologic contrast agents  Lithium  Benzodiazepines   Calcium Channel Blockers (verapamil)   Doxacurium  Neuromuscular blockades (Succinylcholine, Cisatracurium, Rocuronium, Vecuronium, Atracurium)  Diclomine         THIS PATIENT HAS MYASTHENIA GRAVIS     USE THESE DRUGS WITH CAUTION   1. Antibiotics of the Following Classes               A. Aminoglycosides (end in "mycin", "micin" e.g. Neomycin, tobramycin, gentamicin)               B. Flagyl (metronidazole)               C. Macrolides (e.g. Erythromycin, azithromycin (Zithromax), " "clarithromycin)   2. Beta Blockers (oral, parenteral and ophthalmic preparations)               A. (end in "olol" e.g. Atenolol, labetalol, metoprolol, propranolol, sotalol, timolol, etc)   3. Calcium Channel Blockers (end in "ipine" e.g. Amlodipine (Norvasc), nicardipine, nifedipine (Procardia), felodipine (Plendil))   4. Corticosteroids & ACTH   5. Interferons   6. Magnesium   7. Narcotic analgesics (if there is respiratory compromise e.g. Demerol, morphine)   8. Phenothiazines (end in "zine" e.g. Compazine, chlorpromazine (Thorazine), fluphenazine (Prolixin), perphenazine (Trilafon), Sparine)   9. Respiratory Depressants   10. Sedatives and Hypnotics       THESE DRUGS SHOULD *NOT* BE USED IN PATIENTS WITH MYASTHENIA GRAVIS WITHOUT PRIOR DISCUSSION WITH A NEUROMUSCULAR SPECIALIST   1. Ketolides (e.g. Telithromycin) - FDA BLACK BOX WARNING - Do NOT Use   2. Fluoroquinolones (end in "acin" e.g. Levofloxacin (Levaquin), ciprofloxacin (CIPRO), moxifloxacin (Avelox) - FDA BLACK BOX WARNING   3. Botulinum toxin   4. D-Penicillamine       NURSES -- TRIPLE CHECK BEFORE GIVING THE FOLLOWING DUE TO THE HIGH PROBABILITY OF PROLONGED WEAKNESS OR MG CRISIS   1. Neuromuscular blocking agent (both depolarizing and non-depolarizing)               A. Succinylcholine-like               B. Curare-like   2. Quinidine   3. Quinine                Cardiac/Vascular    Essential hypertension (Chronic)    Current Assessment & Plan     Discussed with patient importance of management of hypertension due to secondary stroke risk. Patient is on appropriate therapy currently managed by PCP.               A total of at least 30 minutes was spent on this encounter and included charts and records review from other medical providers, imaging and diagnostic testing results review, patient interview and examination and discussion, documentation, signing of therapy enrollment forms and consent forms, and discussion of planning with the infusion nurse " coordinator.    RTC in 3-4 months    LUIS A Eli MD  [facility]

## 2023-01-12 NOTE — ASSESSMENT & PLAN NOTE
Continue to monitor WBC and LFTs patient will do this through his primary care doctor     Current Therapy Regimen:   Imuran 100mg  Mestinon 60mg PRN q4-6 hours for weakness    Patient does not want to continue to try to do infusions due to improvement of his symptoms and cost. Discussed with the patient that there are cost assistance programs but he is not interested at this time.     Discussed with the patient warning signs for MG crisis or exacerbation and need for immediate presentation to ED for evaluation if any of those symptoms begin to occur. Patient voiced understanding.     Follow up in 3-4 months or sooner if needed.     Myasthenia Gravis IMPORTANT INFORMATION:     Elective intubation should be considered if serial measurements of the VC show values less than 20 mL/kg or if the NIF is worse than -30 cm H20 or is progressively worsening after serial evaluation.        Absolute Contraindicated Medications (Category 1) Contraindicated Medications (Category 2) Likely to Worsen MG Cautionary Medications  (Category 3) That May Worsen MG but are Usually Tolerated   · Curare  · Botulinum toxin  · D-penicillamine  · Interferon alpha     · Aminoglycoside (Gentamycin, Kanamycin, Streptomycin, Neomycin, Tobramycin)  · Macrolide (Erythromycin, Azithromycin, Telithromycin, Biaxin, Clarithromycin)  · Fluoroquinolone - (Ciprofloxacin, Moxifloxacin, Levofloxacin, Delafloxacin, Norfloxacin, Prulifloxacin)  · Quinine (Use only for Malaria)  · Quinidine  · Procainamide  · Magnesium salts, IV magnesium replacement  · Chloroquine  · Hydroxychloroquine  · Immune checkpoint inhibitors: Pembrolizumab (Keytruda), Nivolumab (Opdivo), Atezolizumab (Tecentriq), Avelumab (Bavencio), Durvalumab (Imfinzi), Ipilimumab (Yervoy)    · Atropine   · Corticosteroids  · Desferrioxamine  · Beta blockers  · Statins  · Iodinated radiologic contrast agents  · Lithium  · Benzodiazepines   · Calcium Channel Blockers (verapamil)  "  · Doxacurium  · Neuromuscular blockades (Succinylcholine, Cisatracurium, Rocuronium, Vecuronium, Atracurium)  · Diclomine         THIS PATIENT HAS MYASTHENIA GRAVIS     USE THESE DRUGS WITH CAUTION   1. Antibiotics of the Following Classes               A. Aminoglycosides (end in "mycin", "micin" e.g. Neomycin, tobramycin, gentamicin)               B. Flagyl (metronidazole)               C. Macrolides (e.g. Erythromycin, azithromycin (Zithromax), clarithromycin)   2. Beta Blockers (oral, parenteral and ophthalmic preparations)               A. (end in "olol" e.g. Atenolol, labetalol, metoprolol, propranolol, sotalol, timolol, etc)   3. Calcium Channel Blockers (end in "ipine" e.g. Amlodipine (Norvasc), nicardipine, nifedipine (Procardia), felodipine (Plendil))   4. Corticosteroids & ACTH   5. Interferons   6. Magnesium   7. Narcotic analgesics (if there is respiratory compromise e.g. Demerol, morphine)   8. Phenothiazines (end in "zine" e.g. Compazine, chlorpromazine (Thorazine), fluphenazine (Prolixin), perphenazine (Trilafon), Sparine)   9. Respiratory Depressants   10. Sedatives and Hypnotics       THESE DRUGS SHOULD *NOT* BE USED IN PATIENTS WITH MYASTHENIA GRAVIS WITHOUT PRIOR DISCUSSION WITH A NEUROMUSCULAR SPECIALIST   1. Ketolides (e.g. Telithromycin) - FDA BLACK BOX WARNING - Do NOT Use   2. Fluoroquinolones (end in "acin" e.g. Levofloxacin (Levaquin), ciprofloxacin (CIPRO), moxifloxacin (Avelox) - FDA BLACK BOX WARNING   3. Botulinum toxin   4. D-Penicillamine       NURSES -- TRIPLE CHECK BEFORE GIVING THE FOLLOWING DUE TO THE HIGH PROBABILITY OF PROLONGED WEAKNESS OR MG CRISIS   1. Neuromuscular blocking agent (both depolarizing and non-depolarizing)               A. Succinylcholine-like               B. Curare-like   2. Quinidine   3. Quinine      "

## 2023-11-09 ENCOUNTER — CLINICAL SUPPORT (OUTPATIENT)
Dept: RESPIRATORY THERAPY | Facility: HOSPITAL | Age: 73
End: 2023-11-09
Attending: INTERNAL MEDICINE
Payer: OTHER GOVERNMENT

## 2023-11-09 ENCOUNTER — HOSPITAL ENCOUNTER (OUTPATIENT)
Dept: RADIOLOGY | Facility: HOSPITAL | Age: 73
Discharge: HOME | End: 2023-11-09
Attending: INTERNAL MEDICINE
Payer: OTHER GOVERNMENT

## 2023-11-09 ENCOUNTER — TELEPHONE (OUTPATIENT)
Dept: NEUROLOGY | Facility: CLINIC | Age: 73
End: 2023-11-09
Payer: OTHER GOVERNMENT

## 2023-11-09 DIAGNOSIS — R06.02 SHORTNESS OF BREATH: ICD-10-CM

## 2023-11-09 DIAGNOSIS — R06.02 SHORTNESS OF BREATH: Primary | ICD-10-CM

## 2023-11-09 PROCEDURE — 94640 AIRWAY INHALATION TREATMENT: CPT

## 2023-11-09 PROCEDURE — 71046 X-RAY EXAM CHEST 2 VIEWS: CPT | Mod: TC

## 2023-11-09 PROCEDURE — 94010 BREATHING CAPACITY TEST: CPT

## 2023-11-09 RX ORDER — ALBUTEROL SULFATE 0.83 MG/ML
2.5 SOLUTION RESPIRATORY (INHALATION)
Status: COMPLETED | OUTPATIENT
Start: 2023-11-09 | End: 2023-11-09

## 2023-11-09 RX ADMIN — ALBUTEROL SULFATE 2.5 MG: 0.83 SOLUTION RESPIRATORY (INHALATION) at 01:11

## 2023-11-10 NOTE — TELEPHONE ENCOUNTER
----- Message from Frank Jones sent at 11/9/2023  8:31 AM CST -----  Type:  Sooner Apoointment Request    Caller is requesting a sooner appointment.  Caller declined first available appointment listed below.  Caller will not accept being placed on the waitlist and is requesting a message be sent to doctor.  Name of Caller:  Otoniel  When is the first available appointment?  3-  Symptoms:  check-up  Would the patient rather a call back or [facility]?  Call back   Best Call Back Number:  [phone]  Additional Information:  no

## 2023-11-30 ENCOUNTER — OFFICE VISIT (OUTPATIENT)
Dept: NEUROLOGY | Facility: CLINIC | Age: 73
End: 2023-11-30
Payer: OTHER GOVERNMENT

## 2023-11-30 ENCOUNTER — LAB VISIT (OUTPATIENT)
Dept: LAB | Facility: HOSPITAL | Age: 73
End: 2023-11-30
Payer: OTHER GOVERNMENT

## 2023-11-30 VITALS
BODY MASS INDEX: 36.33 KG/M2 | HEIGHT: 71 IN | DIASTOLIC BLOOD PRESSURE: 83 MMHG | HEART RATE: 68 BPM | WEIGHT: 259.5 LBS | SYSTOLIC BLOOD PRESSURE: 137 MMHG

## 2023-11-30 DIAGNOSIS — G70.00 MYASTHENIA GRAVIS: ICD-10-CM

## 2023-11-30 DIAGNOSIS — G70.00 MYASTHENIA GRAVIS, ACHR ANTIBODY POSITIVE: Chronic | ICD-10-CM

## 2023-11-30 DIAGNOSIS — E11.65 TYPE 2 DIABETES MELLITUS WITH HYPERGLYCEMIA, WITHOUT LONG-TERM CURRENT USE OF INSULIN: ICD-10-CM

## 2023-11-30 DIAGNOSIS — I10 ESSENTIAL HYPERTENSION: Primary | Chronic | ICD-10-CM

## 2023-11-30 LAB
ALBUMIN SERPL BCP-MCNC: 4.4 G/DL (ref 3.5–5.2)
ALP SERPL-CCNC: 91 U/L (ref 55–135)
ALT SERPL W/O P-5'-P-CCNC: 29 U/L (ref 10–44)
ANION GAP SERPL CALC-SCNC: 11 MMOL/L (ref 8–16)
AST SERPL-CCNC: 35 U/L (ref 10–40)
BASOPHILS # BLD AUTO: 0.02 K/UL (ref 0–0.2)
BASOPHILS NFR BLD: 0.3 % (ref 0–1.9)
BILIRUB SERPL-MCNC: 0.6 MG/DL (ref 0.1–1)
BUN SERPL-MCNC: 14 MG/DL (ref 8–23)
CALCIUM SERPL-MCNC: 9.8 MG/DL (ref 8.7–10.5)
CHLORIDE SERPL-SCNC: 100 MMOL/L (ref 95–110)
CO2 SERPL-SCNC: 28 MMOL/L (ref 23–29)
CREAT SERPL-MCNC: 1.1 MG/DL (ref 0.5–1.4)
DIFFERENTIAL METHOD: ABNORMAL
EOSINOPHIL # BLD AUTO: 0.1 K/UL (ref 0–0.5)
EOSINOPHIL NFR BLD: 1 % (ref 0–8)
ERYTHROCYTE [DISTWIDTH] IN BLOOD BY AUTOMATED COUNT: 12.3 % (ref 11.5–14.5)
EST. GFR  (NO RACE VARIABLE): >60 ML/MIN/1.73 M^2
GLUCOSE SERPL-MCNC: 127 MG/DL (ref 70–110)
HCT VFR BLD AUTO: 43.8 % (ref 40–54)
HGB BLD-MCNC: 14.8 G/DL (ref 14–18)
IGG SERPL-MCNC: 780 MG/DL (ref 650–1600)
IMM GRANULOCYTES # BLD AUTO: 0.02 K/UL (ref 0–0.04)
IMM GRANULOCYTES NFR BLD AUTO: 0.3 % (ref 0–0.5)
LYMPHOCYTES # BLD AUTO: 1.4 K/UL (ref 1–4.8)
LYMPHOCYTES NFR BLD: 17 % (ref 18–48)
MCH RBC QN AUTO: 30.4 PG (ref 27–31)
MCHC RBC AUTO-ENTMCNC: 33.8 G/DL (ref 32–36)
MCV RBC AUTO: 90 FL (ref 82–98)
MONOCYTES # BLD AUTO: 0.6 K/UL (ref 0.3–1)
MONOCYTES NFR BLD: 7.6 % (ref 4–15)
NEUTROPHILS # BLD AUTO: 5.9 K/UL (ref 1.8–7.7)
NEUTROPHILS NFR BLD: 73.8 % (ref 38–73)
NRBC BLD-RTO: 0 /100 WBC
PLATELET # BLD AUTO: 253 K/UL (ref 150–450)
PMV BLD AUTO: 9.9 FL (ref 9.2–12.9)
POTASSIUM SERPL-SCNC: 3.5 MMOL/L (ref 3.5–5.1)
PROT SERPL-MCNC: 7.3 G/DL (ref 6–8.4)
RBC # BLD AUTO: 4.87 M/UL (ref 4.6–6.2)
SODIUM SERPL-SCNC: 139 MMOL/L (ref 136–145)
WBC # BLD AUTO: 7.94 K/UL (ref 3.9–12.7)

## 2023-11-30 PROCEDURE — 85025 COMPLETE CBC W/AUTO DIFF WBC: CPT | Performed by: PHYSICIAN ASSISTANT

## 2023-11-30 PROCEDURE — 80053 COMPREHEN METABOLIC PANEL: CPT | Performed by: PHYSICIAN ASSISTANT

## 2023-11-30 PROCEDURE — 82784 ASSAY IGA/IGD/IGG/IGM EACH: CPT | Performed by: PHYSICIAN ASSISTANT

## 2023-11-30 PROCEDURE — 36415 COLL VENOUS BLD VENIPUNCTURE: CPT | Performed by: PHYSICIAN ASSISTANT

## 2023-11-30 PROCEDURE — 99215 OFFICE O/P EST HI 40 MIN: CPT | Mod: S$GLB,,, | Performed by: PSYCHIATRY & NEUROLOGY

## 2023-11-30 PROCEDURE — 99999 PR PBB SHADOW E&M-EST. PATIENT-LVL IV: CPT | Mod: PBBFAC,,, | Performed by: PSYCHIATRY & NEUROLOGY

## 2023-11-30 PROCEDURE — 99215 PR OFFICE/OUTPT VISIT, EST, LEVL V, 40-54 MIN: ICD-10-PCS | Mod: S$GLB,,, | Performed by: PSYCHIATRY & NEUROLOGY

## 2023-11-30 PROCEDURE — 99999 PR PBB SHADOW E&M-EST. PATIENT-LVL IV: ICD-10-PCS | Mod: PBBFAC,,, | Performed by: PSYCHIATRY & NEUROLOGY

## 2023-11-30 RX ORDER — AZATHIOPRINE 50 MG/1
100 TABLET ORAL 2 TIMES DAILY
Qty: 360 TABLET | Refills: 3 | Status: SHIPPED | OUTPATIENT
Start: 2023-11-30 | End: 2023-12-06

## 2023-11-30 NOTE — PROGRESS NOTES
Subjective:     Chief Complaint:  AChR Antibody Positive Myasthenia Gravis      Interval History 11/30/2023    I have reviewed all relevant history in Epic. The patient presents for routine follow up regarding AChR antibody positive MG. He is currently managed with Ultomiris infusions [id number], mestinon 60mg Q4-6 hours PRN, and Imuran 100mg QHS. Per the patient his wife reports that he has frequent fatigue and exhaustion. He reports that he feels okay when he wakes in the morning but as he becomes more weak and tired. He reports that in the past he was unable to afford the [employer]. He notes that he does feel as if his symptom burden has increased over the past few months.       Myasthenia Gravis Activities of Daily Living Scale 11/30/23     Grade   Function 0 1 2 3   Double Vision None Occasional, not every day Daily, but not constant Constant          Eyelid Droop None Occasional, not every day Daily, but not constant Constant          Talking Normal Intermittent slurring or nasal speech Constant slurring or nasal speech, but can be understood Speech difficult to understand          Chewing Normal Fatigues with solid food Fatigues with soft food Gastric tube          Swallowing Normal Chokes rarely Frequent choking requiring change of diet Gastric tube          Breathing Normal Shortness of breath on exertion Shortness of breath at rest Ventilator          Brushing teeth or hair Normal Requires extra effort but requires no rest period Rest periods needed Cannot do one or more of these functions          Ability to rise from chair or toilet Normal Sometimes uses arms Always uses arms Requires assistance          Total MG ADL Score 9       Interval History 01/12/2023-  I have reviewed all relevant medical history in Epic, patient is presenting to inquire more information regarding his soliris infusions. His current therapy regimen is Imuran 100mg and Mestinon 60mg Q4-6 hours PRN for weakness. Previously the patient  requested to try the Ultomiris instead however due to insurance he needs to try soliris rather than ultomiris. The patient had asked questions regarding his insurance coverage. He finds his arms and legs still have weakness and when this occurs he has to sit down and rest.    Interval History 12/12/2022 - I have reviewed relevant medical records in Epic. Here for follow up for AChR Ab+ MG. He notes that he has fluctuating weakness throughout the day which is somewhat relieved with the Mestinon but it seems to only benefit him for a short period of time. His current therapy is Imuran 100mg QHS, pyridostigmine 60mg Q4-6 hours PRN for weakness. He states when he feels tired he lays down because he is scared to fall. Per the patient and his wife this occurs more frequently than the patient admits. The patient mentioned that he saw advertisements for Vyvgart on TV and is curious to discuss. Per the patient he is close to [place].       Interval History 2.4.2021 - I have reviewed all relevant medical records in [facility]. Seen today for routine follow up for ACh R Ab+ MG. Very well-controlled with Imuran 100 mg daily. Rarely uses Mestinon and is not taking any steroids. He is no long taking any IVIg infusions and has not noticed any relapse since removing that from his regimen. He denies any UE or LE weaknesses. He has no voice changes, no swallowing or chewing difficulties, no SOB or BETHEA, no diplopia or ptosis. No apparent side effects from Imuran. He had recent labs done that show WBC = 7.3 and AST/ALT = 36/32.    The patient location is: Home in [place]  The chief complaint leading to consultation is: ACh R Ab+ MG    Visit type: audiovisual    Face to Face time with patient: 20 minutes  25 minutes of total time spent on the encounter, which includes face to face time and non-face to face time preparing to see the patient (eg, review of tests), Obtaining and/or reviewing separately obtained history, Documenting clinical  information in the electronic or other health record, Independently interpreting results (not separately reported) and communicating results to the patient/family/caregiver, or Care coordination (not separately reported).     Each patient to whom he or she provides medical services by telemedicine is:  (1) informed of the relationship between the physician and patient and the respective role of any other health care provider with respect to management of the patient; and (2) notified that he or she may decline to receive medical services by telemedicine and may withdraw from such care at any time.    History of Present Illness:  I have reviewed all relevant medical records in [facility]. Otoniel Candelario is a 73 y.o. male who presents today for follow up of AChR Ab+ MG (binding and modulating), also SMAb+. Diagnosed in 2017, thymectomy (no thymoma) in 2018. Managed initially with steroids and IVIg, now on Imuran that was started over one year ago, off of steroids, and getting IVIg per [facility] Q8 weeks. No reported weakness or relapse in between Ig infusions. Tolerating infusions well but he does not report any weaknesses developing prior to Ig and not reporting any improvements after IVIg. Tolerating Imuran well (100 mg QHS) and needs no Mestinon for rescue. Onset of symptoms included UE and LE weaknesses as well as dysphagia and some voice changes. Little to no ocular weaknesses reported. Since last seen here by Dr. Jacques he has had no weaknesses at all and reports active days doing yard work, etc. No issues with walking, no falls. No dyspnea, no orthopnea, no dysphagia, no diplopia, no ptosis.    Diagnosed: March 2017  MG Type: Generalized (ptosis, dysphagia, dysarthria, generalized weakness)  Readmission: None  Antibody status: Striated Ab (+) 1:80899, Ach Binding (+) 66.2, Ach Modul (+) 96%  Thymoma: Thymectomy 7/16/18   Medications: IVIG 100g q8 weeks (through [facility], started 2017), Imuran 100 mg QHS   Acute  "treatments: None  Intubated: Never    Review of Systems  Review of Systems   Constitutional:  Negative for activity change, fatigue and fever.   HENT:  Negative for hearing loss, trouble swallowing and voice change.    Eyes:  Negative for pain, redness and visual disturbance.   Respiratory:  Negative for choking, chest tightness and shortness of breath.    Cardiovascular:  Negative for chest pain.   Gastrointestinal:  Negative for abdominal pain, nausea and vomiting.   Endocrine: Negative for cold intolerance.   Genitourinary:  Negative for frequency.   Musculoskeletal:  Negative for arthralgias, back pain, gait problem, joint swelling, myalgias and neck pain.   Skin:  Negative for color change.   Allergic/Immunologic: Negative for immunocompromised state.   Neurological:  Negative for dizziness, seizures, speech difficulty, weakness, numbness and headaches.   Hematological:  Negative for adenopathy.   Psychiatric/Behavioral:  Negative for agitation, behavioral problems, dysphoric mood and suicidal ideas.         Objective:     Vitals:    11/30/23 1414   BP: 137/83   Pulse: 68   Weight: 117.7 kg (259 lb 7.7 oz)   Height: 5' 11" (1.803 m)   PainSc:   4         Neurologic Exam     Mental Status   Oriented to person, place, and time.   Attention: normal.   Speech: speech is normal   Level of consciousness: alert  Knowledge: good.     Cranial Nerves     CN II   Visual fields full to confrontation.     CN III, IV, VI   Pupils are equal, round, and reactive to light.  Diplopia: left and right  Abnormal upgaze: Unable to maintain upward gaze.    CN V   Facial sensation intact.     CN VII   Facial expression full, symmetric.     CN VIII   Hearing: intact    CN IX, X   CN IX normal.     CN XI   CN XI normal.     CN XII   CN XII normal.     Motor Exam   Muscle bulk: normal  Overall muscle tone: normal    Strength   Strength 5/5 throughout. Fatigable weakness in deltoid, biceps, triceps     Sensory Exam   Light touch normal. "   Vibration normal.     Gait, Coordination, and Reflexes     Gait  Gait: normal    Tremor   Resting tremor: absent  Intention tremor: absent  Action tremor: absent    Reflexes   Right brachioradialis: 2+  Left brachioradialis: 2+  Right biceps: 2+  Left biceps: 2+  Right triceps: 2+  Left triceps: 2+  Right patellar: 2+  Left patellar: 2+  Right achilles: 2+  Left achilles: 2+      Physical Exam  Vitals reviewed.   Constitutional:       Appearance: He is well-developed.   HENT:      Head: Normocephalic and atraumatic.   Eyes:      Pupils: Pupils are equal, round, and reactive to light.   Neck:      Thyroid: No thyromegaly.   Cardiovascular:      Rate and Rhythm: Normal rate.   Pulmonary:      Effort: Pulmonary effort is normal.   Abdominal:      Palpations: Abdomen is soft.   Musculoskeletal:      Cervical back: Normal range of motion and neck supple.   Lymphadenopathy:      Cervical: No cervical adenopathy.   Skin:     General: Skin is warm and dry.   Neurological:      Mental Status: He is oriented to person, place, and time.      Motor: Motor strength is normal.     Gait: Gait is intact.      Deep Tendon Reflexes:      Reflex Scores:       Tricep reflexes are 2+ on the right side and 2+ on the left side.       Bicep reflexes are 2+ on the right side and 2+ on the left side.       Brachioradialis reflexes are 2+ on the right side and 2+ on the left side.       Patellar reflexes are 2+ on the right side and 2+ on the left side.       Achilles reflexes are 2+ on the right side and 2+ on the left side.  Psychiatric:         Speech: Speech normal.         Behavior: Behavior normal.         Thought Content: Thought content normal.           Lab Results   Component Value Date    WBC 7.94 11/30/2023    HGB 14.8 11/30/2023    HCT 43.8 11/30/2023     11/30/2023    ALT 29 11/30/2023    AST 35 11/30/2023     11/30/2023    K 3.5 11/30/2023     11/30/2023    CREATININE 1.1 11/30/2023    BUN 14 11/30/2023     CO2 28 11/30/2023    HGBA1C 10.2 (H) 07/19/2018         Assessment and Plan:     Problem List Items Addressed This Visit       Myasthenia gravis, AChR antibody positive (Chronic)    Overview     Diagnosed: March 2017  MG Type: Generalized  Readmission: None  Antibody status: Striated Ab (+), Ach Binding (+), Ach Modul (+)  Thymoma: Thymectomy 7/16/18   Intubated: Never    12/12/2022 MG-ADL- 7   1/12/2023 MG-ADL- 2  11/30/2023 MG-ADL- 9      Current Regimen:   100mg Imuran-- can tolerate up to 200mg QHS will taper up with 150mg for 2-3 weeks if tolerated will increase to 100mg q12 hours  Mestinon 60mg Q4-6 hours PRN   Vyvgart paperwork filled out in clinic today       Outside LFT's reviewed WNL  Discussion with the patient and his wife regarding the risk and benefit of the use of MG therapies which have newly been approved including soliris, ultomiris, and vyvgart.     The patient was informed that with the soliris and ultomiris the risk of the patient having a decreased immune response to encapsulated bacteria is increased and therefore the patient would need to be preventatively vaccinated for meningococcal meningitis.     Described the mechanisms of action to the patient as well as the scheduling of the infusion schedule.     Would prefer home infusions, patient signed all paperwork for Ultomiris at clinic visit today     The patient was unable to get vaccines for Ultomiris injections and  did not proceed with further infusions.     If unable to get home infusions preferance of location:   1. [place]  [facility]. [place]  3. [place]  [facility]. [facility]             Current Assessment & Plan     Discussion with the patient regarding the risk and benefit of the use of MG therapies which have newly been approved including soliris, ultomiris, and vyvgart.     The patient was informed that with the soliris and ultomiris the risk of the patient having a decreased immune response to encapsulated bacteria is increased and therefore  the patient would need to be preventatively vaccinated for meningococcal meningitis.     Described the mechanisms of action to the patient as well as the scheduling of the infusion schedule.     Patient wishes to pursue Vyvgart or Hytrulo . We will complete paperwork in clinic today and send to see if infusion and/or SQ will be covered by insurance    - Increase Imuran to 100/50 x 2 weeks and then 100 Q12. Repeat LFTs in 2 weeks, then 4 weeks.    Myasthenia Gravis IMPORTANT INFORMATION:     Elective intubation should be considered if serial measurements of the VC show values less than 20 mL/kg or if the NIF is worse than -30 cm H20 or is progressively worsening after serial evaluation.        Absolute Contraindicated Medications (Category 1) Contraindicated Medications (Category 2) Likely to Worsen MG Cautionary Medications  (Category 3) That May Worsen MG but are Usually Tolerated   Curare  Botulinum toxin  D-penicillamine  Interferon alpha     Aminoglycoside (Gentamycin, Kanamycin, Streptomycin, Neomycin, Tobramycin)  Macrolide (Erythromycin, Azithromycin, Telithromycin, Biaxin, Clarithromycin)  Fluoroquinolone - (Ciprofloxacin, Moxifloxacin, Levofloxacin, Delafloxacin, Norfloxacin, Prulifloxacin)  Quinine (Use only for Malaria)  Quinidine  Procainamide  Magnesium salts, IV magnesium replacement  Chloroquine  Hydroxychloroquine  Immune checkpoint inhibitors: Pembrolizumab (Keytruda), Nivolumab (Opdivo), Atezolizumab (Tecentriq), Avelumab (Bavencio), Durvalumab (Imfinzi), Ipilimumab (Yervoy)    Atropine   Corticosteroids  Desferrioxamine  Beta blockers  Statins  Iodinated radiologic contrast agents  Lithium  Benzodiazepines   Calcium Channel Blockers (verapamil)   Doxacurium  Neuromuscular blockades (Succinylcholine, Cisatracurium, Rocuronium, Vecuronium, Atracurium)  Diclomine         THIS PATIENT HAS MYASTHENIA GRAVIS     USE THESE DRUGS WITH CAUTION   1. Antibiotics of the Following Classes               A.  "Aminoglycosides (end in "mycin", "micin" e.g. Neomycin, tobramycin, gentamicin)               B. Flagyl (metronidazole)               C. Macrolides (e.g. Erythromycin, azithromycin (Zithromax), clarithromycin)   2. Beta Blockers (oral, parenteral and ophthalmic preparations)               A. (end in "olol" e.g. Atenolol, labetalol, metoprolol, propranolol, sotalol, timolol, etc)   3. Calcium Channel Blockers (end in "ipine" e.g. Amlodipine (Norvasc), nicardipine, nifedipine (Procardia), felodipine (Plendil))   4. Corticosteroids & ACTH   5. Interferons   6. Magnesium   7. Narcotic analgesics (if there is respiratory compromise e.g. Demerol, morphine)   8. Phenothiazines (end in "zine" e.g. Compazine, chlorpromazine (Thorazine), fluphenazine (Prolixin), perphenazine (Trilafon), Sparine)   9. Respiratory Depressants   10. Sedatives and Hypnotics       THESE DRUGS SHOULD *NOT* BE USED IN PATIENTS WITH MYASTHENIA GRAVIS WITHOUT PRIOR DISCUSSION WITH A NEUROMUSCULAR SPECIALIST   1. Ketolides (e.g. Telithromycin) - FDA BLACK BOX WARNING - Do NOT Use   2. Fluoroquinolones (end in "acin" e.g. Levofloxacin (Levaquin), ciprofloxacin (CIPRO), moxifloxacin (Avelox) - FDA BLACK BOX WARNING   3. Botulinum toxin   4. D-Penicillamine       NURSES -- TRIPLE CHECK BEFORE GIVING THE FOLLOWING DUE TO THE HIGH PROBABILITY OF PROLONGED WEAKNESS OR MG CRISIS   1. Neuromuscular blocking agent (both depolarizing and non-depolarizing)               A. Succinylcholine-like               B. Curare-like   2. Quinidine   3. Quinine             Relevant Medications    azaTHIOprine (IMURAN) 50 mg Tab    Other Relevant Orders    IGG (Completed)    CBC auto differential (Completed)    Comprehensive metabolic panel (Completed)    Essential hypertension - Primary (Chronic)    Current Assessment & Plan     Discussed with patient importance of management of hypertension due to secondary stroke risk. Patient is on appropriate therapy currently managed by " PCP.            Type 2 diabetes mellitus    Current Assessment & Plan     Patients diabetes mellitus type 2 status puts them at an increased risk for neuropathy, stroke, heart attack, and atherosclerosis were discussed with the patient. They voiced understanding. Patient is currently being managed by PCP for DM2. Discussed importance of maintaining adequate control of sugars in order to prevent further disease progression.             Other Visit Diagnoses       Myasthenia gravis        Relevant Medications    azaTHIOprine (IMURAN) 50 mg Tab            Due to the patient complexity and requirement of ongoing infusion therapy the level of care for service is a level 5. This is due to the patient care treatment plan meeting the requirement for drug therapy requiring intensive monitoring for toxicity.       LUIS A Eli MD  [facility]

## 2023-11-30 NOTE — ASSESSMENT & PLAN NOTE
Discussion with the patient regarding the risk and benefit of the use of MG therapies which have newly been approved including soliris, ultomiris, and vyvgart.     The patient was informed that with the soliris and ultomiris the risk of the patient having a decreased immune response to encapsulated bacteria is increased and therefore the patient would need to be preventatively vaccinated for meningococcal meningitis.     Described the mechanisms of action to the patient as well as the scheduling of the infusion schedule.     Patient wishes to pursue Vyvgart or Hytrulo . We will complete paperwork in clinic today and send to see if infusion and/or SQ will be covered by insurance    - Increase Imuran to 100/50 x 2 weeks and then 100 Q12. Repeat LFTs in 2 weeks, then 4 weeks.    Myasthenia Gravis IMPORTANT INFORMATION:     Elective intubation should be considered if serial measurements of the VC show values less than 20 mL/kg or if the NIF is worse than -30 cm H20 or is progressively worsening after serial evaluation.        Absolute Contraindicated Medications (Category 1) Contraindicated Medications (Category 2) Likely to Worsen MG Cautionary Medications  (Category 3) That May Worsen MG but are Usually Tolerated   Curare  Botulinum toxin  D-penicillamine  Interferon alpha     Aminoglycoside (Gentamycin, Kanamycin, Streptomycin, Neomycin, Tobramycin)  Macrolide (Erythromycin, Azithromycin, Telithromycin, Biaxin, Clarithromycin)  Fluoroquinolone - (Ciprofloxacin, Moxifloxacin, Levofloxacin, Delafloxacin, Norfloxacin, Prulifloxacin)  Quinine (Use only for Malaria)  Quinidine  Procainamide  Magnesium salts, IV magnesium replacement  Chloroquine  Hydroxychloroquine  Immune checkpoint inhibitors: Pembrolizumab (Keytruda), Nivolumab (Opdivo), Atezolizumab (Tecentriq), Avelumab (Bavencio), Durvalumab (Imfinzi), Ipilimumab (Yervoy)    Atropine   Corticosteroids  Desferrioxamine  Beta blockers  Statins  Iodinated radiologic  "contrast agents  Lithium  Benzodiazepines   Calcium Channel Blockers (verapamil)   Doxacurium  Neuromuscular blockades (Succinylcholine, Cisatracurium, Rocuronium, Vecuronium, Atracurium)  Diclomine         THIS PATIENT HAS MYASTHENIA GRAVIS     USE THESE DRUGS WITH CAUTION   1. Antibiotics of the Following Classes               A. Aminoglycosides (end in "mycin", "micin" e.g. Neomycin, tobramycin, gentamicin)               B. Flagyl (metronidazole)               C. Macrolides (e.g. Erythromycin, azithromycin (Zithromax), clarithromycin)   2. Beta Blockers (oral, parenteral and ophthalmic preparations)               A. (end in "olol" e.g. Atenolol, labetalol, metoprolol, propranolol, sotalol, timolol, etc)   3. Calcium Channel Blockers (end in "ipine" e.g. Amlodipine (Norvasc), nicardipine, nifedipine (Procardia), felodipine (Plendil))   4. Corticosteroids & ACTH   5. Interferons   6. Magnesium   7. Narcotic analgesics (if there is respiratory compromise e.g. Demerol, morphine)   8. Phenothiazines (end in "zine" e.g. Compazine, chlorpromazine (Thorazine), fluphenazine (Prolixin), perphenazine (Trilafon), Sparine)   9. Respiratory Depressants   10. Sedatives and Hypnotics       THESE DRUGS SHOULD *NOT* BE USED IN PATIENTS WITH MYASTHENIA GRAVIS WITHOUT PRIOR DISCUSSION WITH A NEUROMUSCULAR SPECIALIST   1. Ketolides (e.g. Telithromycin) - FDA BLACK BOX WARNING - Do NOT Use   2. Fluoroquinolones (end in "acin" e.g. Levofloxacin (Levaquin), ciprofloxacin (CIPRO), moxifloxacin (Avelox) - FDA BLACK BOX WARNING   3. Botulinum toxin   4. D-Penicillamine       NURSES -- TRIPLE CHECK BEFORE GIVING THE FOLLOWING DUE TO THE HIGH PROBABILITY OF PROLONGED WEAKNESS OR MG CRISIS   1. Neuromuscular blocking agent (both depolarizing and non-depolarizing)               A. Succinylcholine-like               B. Curare-like   2. Quinidine   3. Quinine      "

## 2023-11-30 NOTE — PATIENT INSTRUCTIONS
Take 2 tablets of Imuran (Azothioprine) in the morning and 1 tablet of Imuran at night for 2-3 weeks if you tolerate this well increase to 2 tablets in the morning and 2 tablets in the evening.

## 2023-12-05 ENCOUNTER — TELEPHONE (OUTPATIENT)
Dept: NEUROLOGY | Facility: CLINIC | Age: 73
End: 2023-12-05
Payer: OTHER GOVERNMENT

## 2023-12-05 NOTE — TELEPHONE ENCOUNTER
----- Message from Naz Malhotra sent at 12/5/2023  4:06 PM CST -----  Contact: pt/ [phone]  Type:  Patient  Call    Who Called: Pt   Would the patient rather a call back or a response via [facility]? Call   Best Call Back Number:[phone]  Additional Information:  Per pt  stated that the MD  need to  call  the pharmacy regarding  more  information  that is  needed  for  pt  medication.

## 2023-12-06 RX ORDER — ACETAMINOPHEN 325 MG/1
650 TABLET ORAL ONCE AS NEEDED
Start: 2023-12-06

## 2023-12-06 RX ORDER — DIPHENHYDRAMINE HYDROCHLORIDE 50 MG/ML
25 INJECTION INTRAMUSCULAR; INTRAVENOUS ONCE AS NEEDED
Start: 2023-12-06

## 2023-12-06 RX ORDER — AZATHIOPRINE 50 MG/1
100 TABLET ORAL 2 TIMES DAILY
Qty: 360 TABLET | Refills: 3 | Status: SHIPPED | OUTPATIENT
Start: 2023-12-06 | End: 2023-12-15 | Stop reason: SDUPTHER

## 2023-12-06 RX ORDER — HEPARIN 100 UNIT/ML
500 SYRINGE INTRAVENOUS
OUTPATIENT
Start: 2023-12-06

## 2023-12-06 RX ORDER — SODIUM CHLORIDE 0.9 % (FLUSH) 0.9 %
10 SYRINGE (ML) INJECTION
OUTPATIENT
Start: 2023-12-06

## 2023-12-06 NOTE — TELEPHONE ENCOUNTER
Good Morning I spoke with [facility] pharmacy  stated you put in two  different direction for pt meds.

## 2023-12-11 ENCOUNTER — TELEPHONE (OUTPATIENT)
Dept: NEUROLOGY | Facility: CLINIC | Age: 73
End: 2023-12-11
Payer: OTHER GOVERNMENT

## 2023-12-11 NOTE — TELEPHONE ENCOUNTER
----- Message from Stephy Moseley sent at 12/11/2023  1:07 PM CST -----  Regarding: Pt advice  Contact: Paxton [phone]  Paxton Major is calling to speak with the provider nurse would like to know if investigation to summary report was received. Please call would like to discuss

## 2023-12-15 ENCOUNTER — DOCUMENTATION ONLY (OUTPATIENT)
Dept: HEMATOLOGY/ONCOLOGY | Facility: CLINIC | Age: 73
End: 2023-12-15
Payer: OTHER GOVERNMENT

## 2023-12-15 DIAGNOSIS — G70.00 MYASTHENIA GRAVIS: ICD-10-CM

## 2023-12-15 RX ORDER — AZATHIOPRINE 50 MG/1
100 TABLET ORAL 2 TIMES DAILY
Qty: 360 TABLET | Refills: 3 | Status: SHIPPED | OUTPATIENT
Start: 2023-12-15

## 2023-12-15 NOTE — PROGRESS NOTES
We received the orders for this patient's Vyvgart infusion and contacted him schedule. Unfortunately the patient has declined scheduling this infusion at this time due to financial reasons. The patient states he will call back to schedule once he is ready. Referring provider notified.

## 2023-12-21 ENCOUNTER — TELEPHONE (OUTPATIENT)
Dept: NEUROLOGY | Facility: CLINIC | Age: 73
End: 2023-12-21
Payer: OTHER GOVERNMENT

## 2023-12-22 ENCOUNTER — TELEPHONE (OUTPATIENT)
Dept: NEUROLOGY | Facility: CLINIC | Age: 73
End: 2023-12-22
Payer: OTHER GOVERNMENT

## 2023-12-22 NOTE — TELEPHONE ENCOUNTER
[facility] to coordinate with patient regarding their financial assistance program.

## 2023-12-22 NOTE — TELEPHONE ENCOUNTER
----- Message from Leslie Malhotra MA sent at 12/21/2023  3:03 PM CST -----  Regarding: FW: For Oscar PA status for medicaiton needed  Contact: [phone]  Please advised thank you.  ----- Message -----  From: Shelli Aguilar  Sent: 12/21/2023  11:34 AM CST  To: [facility] Staff  Subject: For Oscar PA status for medicaiton needed        Regarding: In regards to Vyvgart paperwork filled out in clinic today, Prior Authorization status is needed    Contact: Ty calling from [employer] for Vyvgart    Type: Call back to advise of PA status    Who Called:  Ty    Would the patient rather a call back or a response via [facility]? Phone call    Best Call Back Number: [phone]  Fax: [phone]

## 2023-12-22 NOTE — TELEPHONE ENCOUNTER
----- Message from Keely Hinojosa RN sent at 12/22/2023 12:58 PM CST -----  Regarding: FW: Vyvgart  Greetings,    Patient will call back to schedule due to financial concerns.    [facility]  ----- Message -----  From: Leslie Mcclain MA  Sent: 12/15/2023   2:35 PM CST  To: Keely Hinojosa RN  Subject: FW: Vyvgart                                      Please advised thank you.  ----- Message -----  From: Evie Murray MA  Sent: 12/15/2023   2:03 PM CST  To: Ольга Monroe RN; [facility] Staff  Subject: Vyvgart                                          Good afternoon!     I work with the [facility]. We received the orders for this patient's Vyvgart infusion and contacted him schedule. Unfortunately the patient has declined scheduling this infusion at this time due to financial reasons. The patient states he will call back to schedule once he is ready.

## 2024-01-25 ENCOUNTER — TELEPHONE (OUTPATIENT)
Dept: NEUROLOGY | Facility: CLINIC | Age: 74
End: 2024-01-25
Payer: OTHER GOVERNMENT

## 2024-01-26 NOTE — TELEPHONE ENCOUNTER
----- Message from Mackenzie Rosa Segura sent at 1/25/2024  3:48 PM CST -----  Regarding: medicine nneded for Infusions  Contact: Pt @ [phone]  Pt states he is unable to get the Vyvgart medicine Dr. Trejo wanted him to start taking before infusions because the medicine is not on his insurance list. Please c/b to advise.. Thanks

## 2024-01-31 NOTE — TELEPHONE ENCOUNTER
Call placed to patient then [facility] and submitted PA request.  Awaiting response.  PA request number [id number]

## 2024-02-08 ENCOUNTER — TELEPHONE (OUTPATIENT)
Dept: NEUROLOGY | Facility: CLINIC | Age: 74
End: 2024-02-08
Payer: OTHER GOVERNMENT

## 2024-02-08 NOTE — TELEPHONE ENCOUNTER
----- Message from Mackenzie Segura sent at 2/8/2024  4:03 PM CST -----  Regarding: Medication Denial  Contact: Pt @  [phone]  Pt is calling to speak to someone in the office about Vitguard rx being denied. Pt states the medication is not under his insurance. Please c/b to advise.. Thanks

## 2024-02-14 ENCOUNTER — TELEPHONE (OUTPATIENT)
Dept: NEUROLOGY | Facility: CLINIC | Age: 74
End: 2024-02-14
Payer: OTHER GOVERNMENT

## 2024-02-14 NOTE — TELEPHONE ENCOUNTER
----- Message from Ewelina Torres sent at 2/14/2024 11:37 AM CST -----  Regarding: PA Concerns  Contact: Malcolm @ [phone]  Malcolm with [employer] is requesting to speak with corinna to confirm if she is going to appeal the denial for the PA of the pts medication. Please call Ty to discuss further

## 2024-02-15 ENCOUNTER — TELEPHONE (OUTPATIENT)
Dept: HEMATOLOGY/ONCOLOGY | Facility: CLINIC | Age: 74
End: 2024-02-15
Payer: OTHER GOVERNMENT

## 2024-02-15 NOTE — TELEPHONE ENCOUNTER
Mary Ochoa Shelly, RN; Evie Murray MA  Good afternoon,       I have several messages pending with Dr Trejo office and have not been able to determine the next step. At this time Vyvgart is denied as non preferred. Mr Candelario thinks that the preferred medication Soliris will not be beneficial to him and was under the impression that an appeal would be done. As of this afternoon he wishes to cancel his future appointments until we get an approval.    Thank you,  Mary MCCRARY

## 2024-02-20 NOTE — TELEPHONE ENCOUNTER
Spoke with Maira at [facility] informed her that we are not going to appeal due to insurance wanting to try Soliris first due to it being formulary.  Soliris is not contraindicated with patient, and patient only wanted Vyvgart due to his preference.  She asked for a copy of the denial letter, which I faxed over.  She had no further questions at this time.

## 2024-03-04 ENCOUNTER — OFFICE VISIT (OUTPATIENT)
Dept: NEUROLOGY | Facility: CLINIC | Age: 74
End: 2024-03-04
Payer: OTHER GOVERNMENT

## 2024-03-04 VITALS
BODY MASS INDEX: 34.88 KG/M2 | SYSTOLIC BLOOD PRESSURE: 128 MMHG | HEART RATE: 80 BPM | HEIGHT: 71 IN | DIASTOLIC BLOOD PRESSURE: 84 MMHG | WEIGHT: 249.13 LBS

## 2024-03-04 DIAGNOSIS — D84.9 IMMUNOSUPPRESSION: ICD-10-CM

## 2024-03-04 DIAGNOSIS — E78.00 PURE HYPERCHOLESTEROLEMIA: Chronic | ICD-10-CM

## 2024-03-04 DIAGNOSIS — G70.00 MYASTHENIA GRAVIS, ACHR ANTIBODY POSITIVE: Primary | Chronic | ICD-10-CM

## 2024-03-04 DIAGNOSIS — I10 ESSENTIAL HYPERTENSION: Chronic | ICD-10-CM

## 2024-03-04 PROCEDURE — G2211 COMPLEX E/M VISIT ADD ON: HCPCS | Mod: S$GLB,,, | Performed by: PSYCHIATRY & NEUROLOGY

## 2024-03-04 PROCEDURE — 99417 PROLNG OP E/M EACH 15 MIN: CPT | Mod: S$GLB,,, | Performed by: PSYCHIATRY & NEUROLOGY

## 2024-03-04 PROCEDURE — 99215 OFFICE O/P EST HI 40 MIN: CPT | Mod: S$GLB,,, | Performed by: PSYCHIATRY & NEUROLOGY

## 2024-03-04 PROCEDURE — 99999 PR PBB SHADOW E&M-EST. PATIENT-LVL IV: CPT | Mod: PBBFAC,,, | Performed by: PSYCHIATRY & NEUROLOGY

## 2024-03-04 NOTE — Clinical Note
Hi Oscar. Mr. Candelario's Vyvgart was denied because of policy preference for Soliris. I left the signed papers on your desk last week. Thanks!

## 2024-03-04 NOTE — PROGRESS NOTES
Subjective:     Chief Complaint:  AChR Antibody Positive Myasthenia Gravis    Interval History 3/4/24 - Here today for routine follow up for antibody positive myasthenia gravis. Current therapy is Imuran 100 mg Q12 hours. At last visit we discussed all available newer therapies for Ab+ MG and he decided to pursue efgartigimod. Applications were submitted but declined by his insurance because his policy's preference is to first try eculizumab. He has been having persistent MG-related weaknesses and is eager to start therapy with a new drug. He reports being willing to receive meningococcal vaccines.     Myasthenia Gravis Activities of Daily Living Scale (3/4/24)     Grade   Function 0 1 2 3   Double Vision None Occasional, not every day Daily, but not constant Constant          Eyelid Droop None Occasional, not every day Daily, but not constant Constant          Talking Normal Intermittent slurring or nasal speech Constant slurring or nasal speech, but can be understood Speech difficult to understand          Chewing Normal Fatigues with solid food Fatigues with soft food Gastric tube          Swallowing Normal Chokes rarely Frequent choking requiring change of diet Gastric tube          Breathing Normal Shortness of breath on exertion Shortness of breath at rest Ventilator          Brushing teeth or hair Normal Requires extra effort but requires no rest period Rest periods needed Cannot do one or more of these functions          Ability to rise from chair or toilet Normal Sometimes uses arms Always uses arms Requires assistance          Total MG ADL Score 10           Interval History 11/30/2023    I have reviewed all relevant history in Epic. The patient presents for routine follow up regarding AChR antibody positive MG. He is currently managed with Ultomiris infusions [id number], mestinon 60mg Q4-6 hours PRN, and Imuran 100mg QHS. Per the patient his wife reports that he has frequent fatigue and exhaustion. He  reports that he feels okay when he wakes in the morning but as he becomes more weak and tired. He reports that in the past he was unable to afford the [employer]. He notes that he does feel as if his symptom burden has increased over the past few months.       Myasthenia Gravis Activities of Daily Living Scale 11/30/23     Grade   Function 0 1 2 3   Double Vision None Occasional, not every day Daily, but not constant Constant          Eyelid Droop None Occasional, not every day Daily, but not constant Constant          Talking Normal Intermittent slurring or nasal speech Constant slurring or nasal speech, but can be understood Speech difficult to understand          Chewing Normal Fatigues with solid food Fatigues with soft food Gastric tube          Swallowing Normal Chokes rarely Frequent choking requiring change of diet Gastric tube          Breathing Normal Shortness of breath on exertion Shortness of breath at rest Ventilator          Brushing teeth or hair Normal Requires extra effort but requires no rest period Rest periods needed Cannot do one or more of these functions          Ability to rise from chair or toilet Normal Sometimes uses arms Always uses arms Requires assistance          Total MG ADL Score 9       Interval History 01/12/2023-  I have reviewed all relevant medical history in Epic, patient is presenting to inquire more information regarding his soliris infusions. His current therapy regimen is Imuran 100mg and Mestinon 60mg Q4-6 hours PRN for weakness. Previously the patient requested to try the Ultomiris instead however due to insurance he needs to try soliris rather than ultomiris. The patient had asked questions regarding his insurance coverage. He finds his arms and legs still have weakness and when this occurs he has to sit down and rest.    Interval History 12/12/2022 - I have reviewed relevant medical records in Epic. Here for follow up for AChR Ab+ MG. He notes that he has fluctuating  weakness throughout the day which is somewhat relieved with the Mestinon but it seems to only benefit him for a short period of time. His current therapy is Imuran 100mg QHS, pyridostigmine 60mg Q4-6 hours PRN for weakness. He states when he feels tired he lays down because he is scared to fall. Per the patient and his wife this occurs more frequently than the patient admits. The patient mentioned that he saw advertisements for Vyvgart on TV and is curious to discuss. Per the patient he is close to [place].       Interval History 2.4.2021 - I have reviewed all relevant medical records in [facility]. Seen today for routine follow up for ACh R Ab+ MG. Very well-controlled with Imuran 100 mg daily. Rarely uses Mestinon and is not taking any steroids. He is no long taking any IVIg infusions and has not noticed any relapse since removing that from his regimen. He denies any UE or LE weaknesses. He has no voice changes, no swallowing or chewing difficulties, no SOB or BETHEA, no diplopia or ptosis. No apparent side effects from Imuran. He had recent labs done that show WBC = 7.3 and AST/ALT = 36/32.    The patient location is: Home in [place]  The chief complaint leading to consultation is: ACh R Ab+ MG    Visit type: audiovisual    Face to Face time with patient: 20 minutes  25 minutes of total time spent on the encounter, which includes face to face time and non-face to face time preparing to see the patient (eg, review of tests), Obtaining and/or reviewing separately obtained history, Documenting clinical information in the electronic or other health record, Independently interpreting results (not separately reported) and communicating results to the patient/family/caregiver, or Care coordination (not separately reported).     Each patient to whom he or she provides medical services by telemedicine is:  (1) informed of the relationship between the physician and patient and the respective role of any other health care  provider with respect to management of the patient; and (2) notified that he or she may decline to receive medical services by telemedicine and may withdraw from such care at any time.    History of Present Illness:  I have reviewed all relevant medical records in [facility]. Otoniel Candelario is a 73 y.o. male who presents today for follow up of AChR Ab+ MG (binding and modulating), also SMAb+. Diagnosed in 2017, thymectomy (no thymoma) in 2018. Managed initially with steroids and IVIg, now on Imuran that was started over one year ago, off of steroids, and getting IVIg per [facility] Q8 weeks. No reported weakness or relapse in between Ig infusions. Tolerating infusions well but he does not report any weaknesses developing prior to Ig and not reporting any improvements after IVIg. Tolerating Imuran well (100 mg QHS) and needs no Mestinon for rescue. Onset of symptoms included UE and LE weaknesses as well as dysphagia and some voice changes. Little to no ocular weaknesses reported. Since last seen here by Dr. Jacques he has had no weaknesses at all and reports active days doing yard work, etc. No issues with walking, no falls. No dyspnea, no orthopnea, no dysphagia, no diplopia, no ptosis.    Diagnosed: March 2017  MG Type: Generalized (ptosis, dysphagia, dysarthria, generalized weakness)  Readmission: None  Antibody status: Striated Ab (+) 1:54850, Ach Binding (+) 66.2, Ach Modul (+) 96%  Thymoma: Thymectomy 7/16/18   Medications: IVIG 100g q8 weeks (through [facility], started 2017), Imuran 100 mg QHS   Acute treatments: None  Intubated: Never    Review of Systems  Review of Systems   Constitutional:  Positive for activity change and fatigue. Negative for fever.   HENT:  Positive for trouble swallowing and voice change. Negative for hearing loss.    Eyes:  Negative for pain, redness and visual disturbance.        Bilateral ptosis   Respiratory:  Positive for shortness of breath. Negative for choking and chest tightness.   "  Cardiovascular:  Negative for chest pain.   Gastrointestinal:  Negative for abdominal pain, nausea and vomiting.   Endocrine: Negative for cold intolerance.   Genitourinary:  Negative for frequency.   Musculoskeletal:  Negative for arthralgias, back pain, gait problem, joint swelling, myalgias and neck pain.   Skin:  Negative for color change.   Allergic/Immunologic: Negative for immunocompromised state.   Neurological:  Positive for speech difficulty and weakness. Negative for dizziness, seizures, numbness and headaches.   Hematological:  Negative for adenopathy.   Psychiatric/Behavioral:  Negative for agitation, behavioral problems, dysphoric mood and suicidal ideas.         Objective:     Vitals:    03/04/24 1314   BP: 128/84   Pulse: 80   Weight: 113 kg (249 lb 1.9 oz)   Height: 5' 11" (1.803 m)   PainSc: 0-No pain           Neurologic Exam     Mental Status   Oriented to person, place, and time.   Attention: normal.   Speech: (dysphonic)  Level of consciousness: alert  Knowledge: good.     Cranial Nerves     CN II   Visual fields full to confrontation.     CN III, IV, VI   Pupils are equal, round, and reactive to light.  Extraocular motions are normal.   Diplopia: left and right  Abnormal upgaze: Unable to maintain upward gaze.    CN V   Facial sensation intact.     CN VII   Facial expression full, symmetric.     CN VIII   Hearing: intact    CN IX, X   CN IX normal.     CN XI   CN XI normal.     CN XII   CN XII normal.   R ptosis     Motor Exam   Muscle bulk: normal  Overall muscle tone: normal    Strength   Strength 5/5 throughout. Fatigable weakness in deltoid, biceps, triceps, hip flexors     Sensory Exam   Light touch normal.   Vibration normal.     Gait, Coordination, and Reflexes     Gait  Gait: normal    Tremor   Resting tremor: absent  Intention tremor: absent  Action tremor: absent    Reflexes   Right brachioradialis: 2+  Left brachioradialis: 2+  Right biceps: 2+  Left biceps: 2+  Right triceps: " 2+  Left triceps: 2+  Right patellar: 2+  Left patellar: 2+      Physical Exam  Vitals reviewed.   Constitutional:       Appearance: He is well-developed.   HENT:      Head: Normocephalic and atraumatic.   Eyes:      Extraocular Movements: EOM normal.      Pupils: Pupils are equal, round, and reactive to light.   Neck:      Thyroid: No thyromegaly.   Cardiovascular:      Rate and Rhythm: Normal rate.   Pulmonary:      Effort: Pulmonary effort is normal.   Abdominal:      Palpations: Abdomen is soft.   Musculoskeletal:      Cervical back: Normal range of motion and neck supple.   Lymphadenopathy:      Cervical: No cervical adenopathy.   Skin:     General: Skin is warm and dry.   Neurological:      Mental Status: He is oriented to person, place, and time.      Motor: Motor strength is normal.     Gait: Gait is intact.      Deep Tendon Reflexes:      Reflex Scores:       Tricep reflexes are 2+ on the right side and 2+ on the left side.       Bicep reflexes are 2+ on the right side and 2+ on the left side.       Brachioradialis reflexes are 2+ on the right side and 2+ on the left side.       Patellar reflexes are 2+ on the right side and 2+ on the left side.  Psychiatric:         Behavior: Behavior normal.         Thought Content: Thought content normal.           Lab Results   Component Value Date    WBC 7.94 11/30/2023    HGB 14.8 11/30/2023    HCT 43.8 11/30/2023     11/30/2023    ALT 29 11/30/2023    AST 35 11/30/2023     11/30/2023    K 3.5 11/30/2023     11/30/2023    CREATININE 1.1 11/30/2023    BUN 14 11/30/2023    CO2 28 11/30/2023    HGBA1C 10.2 (H) 07/19/2018         Assessment and Plan:     Problem List Items Addressed This Visit       Myasthenia gravis, AChR antibody positive - Primary (Chronic)    Overview     Diagnosed: March 2017  MG Type: Generalized  Readmission: None  Antibody status: Striated Ab (+), Ach Binding (+), Ach Modul (+)  Thymoma: Thymectomy 7/16/18   Intubated:  Never    12/12/2022 MG-ADL- 7   1/12/2023 MG-ADL- 2  11/30/2023 MG-ADL- 9      Current Regimen:   100mg Imuran-- can tolerate up to 200mg QHS will taper up with 150mg for 2-3 weeks if tolerated will increase to 100mg q12 hours  Mestinon 60mg Q4-6 hours PRN   Vyvgart paperwork filled out in clinic today       Outside LFT's reviewed WNL  Discussion with the patient and his wife regarding the risk and benefit of the use of MG therapies which have newly been approved including soliris, ultomiris, and vyvgart.     The patient was informed that with the soliris and ultomiris the risk of the patient having a decreased immune response to encapsulated bacteria is increased and therefore the patient would need to be preventatively vaccinated for meningococcal meningitis.     Described the mechanisms of action to the patient as well as the scheduling of the infusion schedule.     Would prefer home infusions, patient signed all paperwork for Ultomiris at clinic visit today     The patient was unable to get vaccines for Ultomiris injections and  did not proceed with further infusions.     If unable to get home infusions preferance of location:   1. [place]  2. [place]  3. [place]  4. [place]             Current Assessment & Plan     At last visit we applied for efgartigimod, which was declined by his insurance plan because of policy preference for eculizumab. Apparently there is no mention of ravulizumab as an alternative. He is willing to proceed with the application for eculizumab. Once initiated we hope to transition to ravulizumab.   - Referral placed to ID for meningococcal vaccines   - Application submitted for eculizumab. Papers signed.   - If possible, Mr. Candelario prefers infusions at home. If not, an infusion center near to [place] or [place].  - Continue Imuran 100 mg Q12 hours for now. LFT and CBC ordered          Relevant Orders    HEPATIC FUNCTION PANEL    CBC Without Differential    Ambulatory  referral/consult to Infectious Disease    Pure hypercholesterolemia (Chronic)    Current Assessment & Plan     On appropriate medications and managed by PCP. We discussed the importance of managing all secondary stroke risk factors, including hyperlipidemia.           Essential hypertension (Chronic)    Current Assessment & Plan     On appropriate medications and managed by PCP. We discussed the importance of managing all secondary stroke risk factors, including hypertension.           Immunosuppression           Wenceslao Trejo MD  [facility]

## 2024-03-14 ENCOUNTER — TELEPHONE (OUTPATIENT)
Dept: NEUROLOGY | Facility: CLINIC | Age: 74
End: 2024-03-14
Payer: OTHER GOVERNMENT

## 2024-03-14 NOTE — TELEPHONE ENCOUNTER
----- Message from Adam Nix sent at 3/14/2024 11:02 AM CDT -----  Regarding: Appt  Contact: TARIQ HICKMAN [[id number]]  Name of Who is Calling: TARIQ HICKMAN [[id number]]      What is the request in detail: Would like to speak with staff in regards to referral for Infectious Disease. States he is looking for something closer to [facility]. Please advise      Can the clinic reply by [facility]: no      What Number to Call Back if not in [facility]: [phone]

## 2024-03-18 NOTE — ASSESSMENT & PLAN NOTE
At last visit we applied for efgartigimod, which was declined by his insurance plan because of policy preference for eculizumab. Apparently there is no mention of ravulizumab as an alternative. He is willing to proceed with the application for eculizumab. Once initiated we hope to transition to ravulizumab.   - Referral placed to ID for meningococcal vaccines   - Application submitted for eculizumab. Papers signed.   - If possible, Mr. Candelario prefers infusions at home. If not, an infusion center near to [facility].  - Continue Imuran 100 mg Q12 hours for now. LFT and CBC ordered

## 2024-03-25 ENCOUNTER — TELEPHONE (OUTPATIENT)
Dept: NEUROLOGY | Facility: CLINIC | Age: 74
End: 2024-03-25
Payer: OTHER GOVERNMENT

## 2024-03-25 NOTE — TELEPHONE ENCOUNTER
----- Message from Aubree Watson sent at 3/25/2024 11:00 AM CDT -----  Contact: [phone]  Moriah Is calling to speak with provider to discuss pt please call Ty @[phone]

## 2024-04-08 NOTE — ASSESSMENT & PLAN NOTE
Assessment  Otoniel Candelario is a 69 y.o. male w/ PMH significant for MG (dx 2017, s/p thymectomy, + striated, modulating, and binding Abs), HLD, HTN, DM, presenting as follow-up for MG.    Concerned by his reports of fatigue as of late.  Unclear if related to BP (fatigue improved after cutting down his BP meds), related to diarrhea (?GI bleed - foul-smelling stool, dark-colored vs anticholinergic effect of mestinon), or worsening of MG.      Recommendations & Plan:  -INCREASE imuran to 50 mg Qdaily and 100 mg QHS  -DECREASE mestinon to 30 mg PRN  -Continue IVIG C1zyupp (via [facility])  -F/u with dermatology for malignancy surveillance while on imuran  -Send reports of recent CBC/CMP for surveillance while on imuran  -IF no improvement in diarrhea w/ decreased mestinon dose, pt will discuss with PCP (?GI bleed or other etiology)  -RTC 3 months    
See MG section  
See MG section  -GI bleed, mestinon/anticholinergic effect, other  
No

## 2024-05-10 ENCOUNTER — TELEPHONE (OUTPATIENT)
Dept: NEUROLOGY | Facility: CLINIC | Age: 74
End: 2024-05-10
Payer: OTHER GOVERNMENT

## 2024-07-01 ENCOUNTER — TELEPHONE (OUTPATIENT)
Dept: NEUROLOGY | Facility: CLINIC | Age: 74
End: 2024-07-01

## 2024-07-01 NOTE — TELEPHONE ENCOUNTER
----- Message from Adam Nix sent at 7/1/2024 12:39 PM CDT -----  Regarding: Appt  Contact: TARIQ HICKMAN [[id number]]  Name of Who is Calling: TARIQ HICKMAN [[id number]]      What is the request in detail: Would like to speak with staff in regards to a follow up appt. Pt states he is having issues with insurance, medication, and trying to get and ID appt close to him. Pt states if he has to come to [place] is find but would like to do it same day as appt with physician. Please advise      Can the clinic reply by [facility]: no      [facility]:  [phone]

## 2024-10-06 ENCOUNTER — HOSPITAL ENCOUNTER (EMERGENCY)
Facility: HOSPITAL | Age: 74
Discharge: HOME | End: 2024-10-06
Attending: FAMILY MEDICINE
Payer: OTHER GOVERNMENT

## 2024-10-06 VITALS
HEART RATE: 76 BPM | RESPIRATION RATE: 18 BRPM | HEIGHT: 71 IN | OXYGEN SATURATION: 97 % | TEMPERATURE: 98 F | DIASTOLIC BLOOD PRESSURE: 91 MMHG | BODY MASS INDEX: 34.3 KG/M2 | WEIGHT: 245 LBS | SYSTOLIC BLOOD PRESSURE: 180 MMHG

## 2024-10-06 DIAGNOSIS — M79.639 FOREARM PAIN: ICD-10-CM
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
## 2024-10-06 DIAGNOSIS — S09.90XA INJURY OF HEAD, INITIAL ENCOUNTER: ICD-10-CM

## 2024-10-06 PROCEDURE — 99284 EMERGENCY DEPT VISIT MOD MDM: CPT | Mod: 25

## 2024-10-06 PROCEDURE — 29125 APPL SHORT ARM SPLINT STATIC: CPT | Mod: LT

## 2024-10-06 NOTE — ED PROVIDER NOTES
Encounter Date: 10/6/2024       History     Chief Complaint   Patient presents with    Fall    Arm Injury     Pt presented to ED per EMS with c/o bilateral arm pain from fall at home. Pt reports he tripped over a wire in grass causing his fall. Pt does report hitting face. Pt denies taking blood thinners.      73-year-old male presents with left arm pain after a fall at home.  Patient reports he tripped over a wire in the grass causing the fall.  Patient does report hitting his face but denies any facial pain.  Asked patient if he would like something for pain control while here in the hospital but patient declined.  Left forearm is in a splint    The history is provided by the patient. No  was used.     Review of patient's allergies indicates:   Allergen Reactions    Demerol [meperidine] Anxiety     Hyper/ aggitation     Past Medical History:   Diagnosis Date    Diabetes mellitus     Difficulty voiding     H/O    High cholesterol     Hypertension     Myasthenia gravis 03/2017    Prostate CA      Past Surgical History:   Procedure Laterality Date    APPENDECTOMY      HERNIA REPAIR      ROBOT-ASSISTED EXPLORATORY LAPAROSCOPY N/A 7/16/2018    Procedure: ROBOTIC LAPAROSCOPY, EXPLORATORY;  Surgeon: Ricky Hernandez MD;  Location: [facility] OR [address] [place] [address];  Service: Thoracic;  Laterality: N/A;    STERNOTOMY N/A 7/16/2018    Procedure: STERNOTOMY median, thymectomy;  Surgeon: Ricky Hernandez MD;  Location: [facility] OR [address] [place] [address];  Service: Thoracic;  Laterality: N/A;     Family History   Problem Relation Name Age of Onset    Anesthesia problems Neg Hx       Social History     Tobacco Use    Smoking status: Some Days     Types: Cigars    Smokeless tobacco: Never   Substance Use Topics    Alcohol use: Yes     Comment: social    Drug use: No     Review of Systems   Respiratory:  Negative for apnea, cough, choking, chest tightness, shortness of breath, wheezing and stridor.    Cardiovascular:  Negative for chest  pain, palpitations and leg swelling.   Gastrointestinal:  Negative for abdominal pain, constipation, diarrhea, nausea and vomiting.   Musculoskeletal:  Positive for gait problem. Negative for arthralgias, back pain, joint swelling, myalgias, neck pain and neck stiffness.        Left arm pain   Neurological:  Negative for dizziness, tremors, seizures, syncope, facial asymmetry, speech difficulty, weakness, light-headedness, numbness and headaches.   All other systems reviewed and are negative.      Physical Exam     Initial Vitals [10/06/24 1823]   BP Pulse Resp Temp SpO2   (!) 180/91 76 18 98 °F (36.7 °C) 97 %      MAP       --         Physical Exam    Nursing note and vitals reviewed.  Constitutional: He appears well-developed and well-nourished.   HENT:   Head: Normocephalic and atraumatic.   Right Ear: External ear normal.   Left Ear: External ear normal.   Nose: Nose normal. Mouth/Throat: Oropharynx is clear and moist.   Eyes: Conjunctivae and EOM are normal.   Neck: Neck supple.   Normal range of motion.  Cardiovascular:  Normal rate, regular rhythm, normal heart sounds and intact distal pulses.           Pulmonary/Chest: Breath sounds normal.   Abdominal: Abdomen is soft. Bowel sounds are normal.   Musculoskeletal:         General: Normal range of motion.      Right shoulder: Normal.      Left shoulder: Normal.      Right upper arm: Normal.      Left upper arm: Normal.      Right elbow: Normal.      Left elbow: Normal.      Right forearm: Normal.      Left forearm: Tenderness and bony tenderness present. No swelling, edema, deformity or lacerations.      Right wrist: Normal.      Left wrist: Normal. No swelling, deformity, effusion, tenderness, bony tenderness, snuff box tenderness or crepitus. Normal range of motion.      Right hand: Normal.      Left hand: Normal.        Arms:       Cervical back: Normal range of motion and neck supple.      Comments: No redness, no swelling, no bruising noted to left  forearm/wrist area. Patient has full range of motion noted to left wrist.      Neurological: He is alert and oriented to person, place, and time. He has normal strength and normal reflexes. GCS score is 15. GCS eye subscore is 4. GCS verbal subscore is 5. GCS motor subscore is 6.   Skin: Skin is warm and dry. Capillary refill takes less than 2 seconds.   Psychiatric: He has a normal mood and affect. His behavior is normal. Judgment and thought content normal.         ED Course   Procedures  Labs Reviewed - No data to display       Imaging Results              CT Maxillofacial Without Contrast (Final result)  Result time 10/06/24 19:22:00      Final result by Lucius Tom MD (10/06/24 19:22:00)                   Impression:        1.  Residua of sinus disease, predominantly involving the ethmoid region.    2.  No evidence of acute trauma identified.    n/a    Category: n/a    The following dose reduction techniques are used for all CT at [facility]:    1.  Automated exposure control.    2.  Adjustment of the mA and/or kV according to patient size.    3.  Use of interative reconstruction technique.      Electronically signed by: Lucius Tom  Date:    10/06/2024  Time:    19:22               Narrative:    EXAMINATION:  CT MAXILLOFACIAL WITHOUT CONTRAST    CLINICAL HISTORY:  Facial trauma, blunt;    TECHNIQUE:  Low dose axial images, sagittal and coronal reformations were obtained through the face.  Contrast was not administered.    COMPARISON:  None    FINDINGS:    Multiplanar imaging through the maxillofacial region/paranasal sinuses was performed.  There is residual of of sinus disease, more evident in the ethmoid region.  The remainder of the paranasal sinuses are otherwise adequately aerated without significant mucosal thickening, air-fluid levels or opacification and the infundibula of both osteomeatal complexes appear adequately patent.  No significant abnormalities are noted  involving the bony nasal septum and the nasal passages appear adequately patent.  The orbits and intraorbital contents, as well as, the adjacnet facial structures, appear intact and unremarkable.                                       CT Head Without Contrast (Final result)  Result time 10/06/24 19:07:32      Final result by Lucius Tom MD (10/06/24 19:07:32)                   Impression:        1. No acute intracranial abnormality.    2.  Residua of sinus disease.    n/a    Category: n/a    The following dose reduction techniques are used for all CT at [facility]:    1.   Automated exposure control.    2.   Adjustment of the mA and/or kV according to patient size.    3.   Use of iterative reconstruction technique.      Electronically signed by: Lucius Tom  Date:    10/06/2024  Time:    19:07               Narrative:    EXAMINATION:  CT HEAD WITHOUT CONTRAST    CLINICAL HISTORY:  Head trauma, minor (Age >= 65y);    TECHNIQUE:  Low dose axial images were obtained through the head.  Coronal and sagittal reformations were also performed. Contrast was not administered.    COMPARISON:  03/02/2015    FINDINGS:  Multiplanar imaging through the head/brain was performed.There is residua of of paranasal sinus disease, more evident in the ethmoid air cells I see no intraparynchymal masses, hemorhagic lesions, or dominant wedge shaped infarcts. I see no extraxial masses or abnormal fluid collections.                                       CT Cervical Spine Without Contrast (Final result)  Result time 10/06/24 19:14:16      Final result by Lucius Tom MD (10/06/24 19:14:16)                   Impression:        1.  MULTILEVEL CHRONIC FINDINGS AS DESCRIBED ABOVE.  I SEE NO EVIDENCE OF ACUTE FRACTURES OR SIGNIFICANT SPONDYLOISTHESIS.  SEE ABOVE COMMENTS REGARDING LIMITATIONS OF THIS EXAMINATION.    n/a    CATEGORY: n/a    The following dose reduction techniques are used for all CT at [facility]:    1.  Automated exposure control.    2.  Adjustment of the mA and/or kV according to patient size.    3.  Use of interative reconstruction technique.      Electronically signed by: Lucius Tom  Date:    10/06/2024  Time:    19:14               Narrative:    EXAMINATION:  CT CERVICAL SPINE WITHOUT CONTRAST    CLINICAL HISTORY:  Neck trauma (Age >= 65y);    TECHNIQUE:  Low dose axial images, sagittal and coronal reformations were performed though the cervical spine.  Contrast was not administered.    COMPARISON:  None    FINDINGS:  Multiplanar imaging through the  cervical spine without intravenous contrast was performed.  It should be noted the posterior margins of the intervertebral disks as well as the  cord, exiting nerve roots and ligamentous structures are less than optimally delineated on CT and would be better evaluated with MRI, if felt to be clinically indicated.  I see no evidence of acute fractures or significant spondylolisthesis.  There is no evidence of focal soft tissue swelling or paravertebral hematomas.  Multilevel moderate vertebral body, facet joint and uncovertebral joint spurring is present and associated with multilevel vertical narrowing of the disc spaces.  The foramina are narrowed at multiple levels bilaterally.  The disc spaces are narrowed, more pronounced at C6-7.  The central canal is also narrowed at multiple levels and perhaps of more pronounced at C4-5.                                       X-Ray Wrist Complete Left (Final result)  Result time 10/06/24 19:24:12      Final result by Lucius Tom MD (10/06/24 19:24:12)                   Impression:    Impression:    1.  No acute wrist pathology.      Electronically signed by: Lucius Tom  Date:    10/06/2024  Time:    19:24               Narrative:    EXAMINATION:  XR WRIST COMPLETE 3 VIEWS LEFT    CLINICAL HISTORY:  Pain in unspecified forearm    TECHNIQUE:  PA, lateral, and oblique views of the  left wrist were performed.    COMPARISON:  None    FINDINGS:  No fracture or dislocation.  Normal alignment. No radio-opaque foreign body.                                       X-Ray Forearm Left (Final result)  Result time 10/06/24 19:02:40      Final result by Lucius Tom MD (10/06/24 19:02:40)                   Impression:      Soft tissue swelling along the extensor surface of the forearm.      Electronically signed by: Lucius Tom  Date:    10/06/2024  Time:    19:02               Narrative:    EXAMINATION:  XR FOREARM LEFT    CLINICAL HISTORY:  Pain in unspecified forearm    TECHNIQUE:  AP and lateral views of the left forearm were performed.    COMPARISON:  None    FINDINGS:  The lateral projection suggests mild soft tissue swelling along the extensor surface of the form.  No acute displaced fracture, dislocation or radiopaque foreign body is identified.                                       Medications - No data to display  Medical Decision Making  73-year-old male presents with left arm pain after a fall at home.  Patient reports he tripped over a wire in the grass causing the fall.  Patient does report hitting his face but denies any facial pain.  Asked patient if he would like something for pain control while here in the hospital but patient declined.  Left forearm is in a splint        Amount and/or Complexity of Data Reviewed  Radiology: ordered.    Risk  Risk Details: Velco wrist splint to left wrist. Tylenol or Motrin as needed for pain control. Follow up with primary care provider in 1-2 days for recheck.                           Medical Decision Making:   Differential Diagnosis:   Left Forearm fracture, Left Forearm strain, Cervical Strain, Cervical Fracture, Traumatic Head Injury             Clinical Impression:  Final diagnoses:  [M79.639] Forearm pain  [W19.XXXA] Fall, initial encounter (Primary)  [S09.90XA] Injury of head, initial encounter          ED Disposition Condition    Discharge  Stable          ED Prescriptions    None       Follow-up Information       Follow up With Specialties Details Why Contact Info    Win Elliott MD Internal Medicine Schedule an appointment as soon as possible for a visit   [facility] [address]  [phone]      [facility] Emergency Medicine  If symptoms worsen [address] [facility] [address]  [phone]             Kareem Abdalla FNP  10/06/24 1928       Kareem Abdalla FNP  10/06/24 1951       Kareem Abdalla FNP  10/06/24 1953

## 2024-10-08 ENCOUNTER — HOSPITAL ENCOUNTER (OUTPATIENT)
Dept: RADIOLOGY | Facility: HOSPITAL | Age: 74
Discharge: HOME | End: 2024-10-08
Attending: INTERNAL MEDICINE
Payer: OTHER GOVERNMENT

## 2024-10-08 DIAGNOSIS — M54.2 CERVICALGIA: ICD-10-CM

## 2024-10-08 DIAGNOSIS — R53.1 ASTHENIA: ICD-10-CM

## 2024-10-08 PROCEDURE — 70551 MRI BRAIN STEM W/O DYE: CPT | Mod: TC

## 2024-10-08 PROCEDURE — 72141 MRI NECK SPINE W/O DYE: CPT | Mod: TC

## 2025-03-03 DIAGNOSIS — G70.00 MYASTHENIA GRAVIS: ICD-10-CM

## 2025-03-10 DIAGNOSIS — G70.00 MYASTHENIA GRAVIS, ACHR ANTIBODY POSITIVE: Primary | ICD-10-CM

## 2025-03-10 RX ORDER — AZATHIOPRINE 50 MG/1
100 TABLET ORAL 2 TIMES DAILY
Qty: 360 TABLET | Refills: 3 | OUTPATIENT
Start: 2025-03-10
